# Patient Record
Sex: FEMALE | Race: WHITE | NOT HISPANIC OR LATINO | Employment: FULL TIME | URBAN - METROPOLITAN AREA
[De-identification: names, ages, dates, MRNs, and addresses within clinical notes are randomized per-mention and may not be internally consistent; named-entity substitution may affect disease eponyms.]

---

## 2017-08-15 ENCOUNTER — GENERIC CONVERSION - ENCOUNTER (OUTPATIENT)
Dept: OTHER | Facility: OTHER | Age: 57
End: 2017-08-15

## 2017-09-05 ENCOUNTER — ALLSCRIPTS OFFICE VISIT (OUTPATIENT)
Dept: OTHER | Facility: OTHER | Age: 57
End: 2017-09-05

## 2017-09-05 DIAGNOSIS — R93.89 ABNORMAL FINDINGS ON DIAGNOSTIC IMAGING OF OTHER SPECIFIED BODY STRUCTURES: ICD-10-CM

## 2017-09-05 DIAGNOSIS — C54.1 MALIGNANT NEOPLASM OF ENDOMETRIUM (HCC): ICD-10-CM

## 2017-09-05 DIAGNOSIS — E66.01 MORBID (SEVERE) OBESITY DUE TO EXCESS CALORIES (HCC): ICD-10-CM

## 2017-09-07 RX ORDER — VALSARTAN AND HYDROCHLOROTHIAZIDE 160; 12.5 MG/1; MG/1
1 TABLET, FILM COATED ORAL DAILY
COMMUNITY

## 2017-09-07 RX ORDER — IBUPROFEN 200 MG
TABLET ORAL EVERY 6 HOURS PRN
Status: ON HOLD | COMMUNITY
End: 2017-09-11 | Stop reason: ALTCHOICE

## 2017-09-07 RX ORDER — OXYCODONE HYDROCHLORIDE AND ACETAMINOPHEN 325; 5 MG/5ML; MG/5ML
SOLUTION ORAL EVERY 4 HOURS PRN
COMMUNITY
End: 2018-02-23 | Stop reason: ALTCHOICE

## 2017-09-08 ENCOUNTER — ANESTHESIA EVENT (OUTPATIENT)
Dept: PERIOP | Facility: HOSPITAL | Age: 57
End: 2017-09-08
Payer: COMMERCIAL

## 2017-09-11 ENCOUNTER — LAB CONVERSION - ENCOUNTER (OUTPATIENT)
Dept: OTHER | Facility: OTHER | Age: 57
End: 2017-09-11

## 2017-09-11 ENCOUNTER — ANESTHESIA (OUTPATIENT)
Dept: PERIOP | Facility: HOSPITAL | Age: 57
End: 2017-09-11
Payer: COMMERCIAL

## 2017-09-11 ENCOUNTER — HOSPITAL ENCOUNTER (OUTPATIENT)
Facility: HOSPITAL | Age: 57
Setting detail: OUTPATIENT SURGERY
Discharge: HOME | End: 2017-09-11
Attending: COLON & RECTAL SURGERY | Admitting: COLON & RECTAL SURGERY
Payer: COMMERCIAL

## 2017-09-11 VITALS
HEIGHT: 72 IN | TEMPERATURE: 98.5 F | HEART RATE: 71 BPM | OXYGEN SATURATION: 99 % | DIASTOLIC BLOOD PRESSURE: 87 MMHG | BODY MASS INDEX: 39.68 KG/M2 | RESPIRATION RATE: 18 BRPM | SYSTOLIC BLOOD PRESSURE: 169 MMHG | WEIGHT: 293 LBS

## 2017-09-11 DIAGNOSIS — Z12.11 ENCOUNTER FOR SCREENING FOR MALIGNANT NEOPLASM OF COLON: ICD-10-CM

## 2017-09-11 PROCEDURE — 88305 TISSUE EXAM BY PATHOLOGIST: CPT | Performed by: COLON & RECTAL SURGERY

## 2017-09-11 RX ORDER — PROPOFOL 10 MG/ML
INJECTION, EMULSION INTRAVENOUS AS NEEDED
Status: DISCONTINUED | OUTPATIENT
Start: 2017-09-11 | End: 2017-09-11 | Stop reason: SURG

## 2017-09-11 RX ORDER — MULTIVITAMIN
1 TABLET ORAL DAILY
COMMUNITY

## 2017-09-11 RX ORDER — SODIUM CHLORIDE, SODIUM LACTATE, POTASSIUM CHLORIDE, CALCIUM CHLORIDE 600; 310; 30; 20 MG/100ML; MG/100ML; MG/100ML; MG/100ML
125 INJECTION, SOLUTION INTRAVENOUS CONTINUOUS
Status: ACTIVE | OUTPATIENT
Start: 2017-09-11 | End: 2017-09-11

## 2017-09-11 RX ORDER — ASPIRIN 81 MG/1
81 TABLET ORAL DAILY
COMMUNITY

## 2017-09-11 RX ORDER — SODIUM CHLORIDE, SODIUM LACTATE, POTASSIUM CHLORIDE, CALCIUM CHLORIDE 600; 310; 30; 20 MG/100ML; MG/100ML; MG/100ML; MG/100ML
125 INJECTION, SOLUTION INTRAVENOUS CONTINUOUS
Status: DISCONTINUED | OUTPATIENT
Start: 2017-09-11 | End: 2017-09-11

## 2017-09-11 RX ADMIN — PROPOFOL 50 MG: 10 INJECTION, EMULSION INTRAVENOUS at 14:36

## 2017-09-11 RX ADMIN — PROPOFOL 50 MG: 10 INJECTION, EMULSION INTRAVENOUS at 14:40

## 2017-09-11 RX ADMIN — PROPOFOL 50 MG: 10 INJECTION, EMULSION INTRAVENOUS at 14:20

## 2017-09-11 RX ADMIN — PROPOFOL 50 MG: 10 INJECTION, EMULSION INTRAVENOUS at 14:43

## 2017-09-11 RX ADMIN — PROPOFOL 50 MG: 10 INJECTION, EMULSION INTRAVENOUS at 14:23

## 2017-09-11 RX ADMIN — PROPOFOL 50 MG: 10 INJECTION, EMULSION INTRAVENOUS at 14:32

## 2017-09-11 RX ADMIN — PROPOFOL 50 MG: 10 INJECTION, EMULSION INTRAVENOUS at 14:30

## 2017-09-11 RX ADMIN — SODIUM CHLORIDE, SODIUM LACTATE, POTASSIUM CHLORIDE, AND CALCIUM CHLORIDE 125 ML/HR: 600; 310; 30; 20 INJECTION, SOLUTION INTRAVENOUS at 13:11

## 2017-09-11 RX ADMIN — LIDOCAINE HYDROCHLORIDE 20 MG: 20 INJECTION, SOLUTION INTRAVENOUS at 14:15

## 2017-09-11 RX ADMIN — PROPOFOL 50 MG: 10 INJECTION, EMULSION INTRAVENOUS at 14:27

## 2017-09-11 RX ADMIN — PROPOFOL 50 MG: 10 INJECTION, EMULSION INTRAVENOUS at 14:25

## 2017-09-11 RX ADMIN — PROPOFOL 50 MG: 10 INJECTION, EMULSION INTRAVENOUS at 14:34

## 2017-09-11 RX ADMIN — PROPOFOL 100 MG: 10 INJECTION, EMULSION INTRAVENOUS at 14:18

## 2017-09-21 ENCOUNTER — HOSPITAL ENCOUNTER (OUTPATIENT)
Dept: NON INVASIVE DIAGNOSTICS | Facility: HOSPITAL | Age: 57
Discharge: HOME | End: 2017-09-21
Payer: COMMERCIAL

## 2017-09-21 ENCOUNTER — TRANSCRIBE ORDERS (OUTPATIENT)
Dept: ADMINISTRATIVE | Facility: HOSPITAL | Age: 57
End: 2017-09-21

## 2017-09-21 ENCOUNTER — HOSPITAL ENCOUNTER (OUTPATIENT)
Dept: RADIOLOGY | Facility: HOSPITAL | Age: 57
Discharge: HOME | End: 2017-09-21
Payer: COMMERCIAL

## 2017-09-21 ENCOUNTER — APPOINTMENT (OUTPATIENT)
Dept: LAB | Facility: HOSPITAL | Age: 57
End: 2017-09-21
Payer: COMMERCIAL

## 2017-09-21 ENCOUNTER — LAB REQUISITION (OUTPATIENT)
Dept: LAB | Facility: HOSPITAL | Age: 57
End: 2017-09-21
Payer: COMMERCIAL

## 2017-09-21 ENCOUNTER — GENERIC CONVERSION - ENCOUNTER (OUTPATIENT)
Dept: OTHER | Facility: OTHER | Age: 57
End: 2017-09-21

## 2017-09-21 DIAGNOSIS — E66.01 MORBID OBESITY, UNSPECIFIED OBESITY TYPE (HCC): ICD-10-CM

## 2017-09-21 DIAGNOSIS — E66.01 MORBID (SEVERE) OBESITY DUE TO EXCESS CALORIES (HCC): ICD-10-CM

## 2017-09-21 DIAGNOSIS — C54.1 MALIGNANT NEOPLASM OF ENDOMETRIUM (HCC): ICD-10-CM

## 2017-09-21 DIAGNOSIS — C54.1 ENDOMETRIAL SARCOMA (HCC): Primary | ICD-10-CM

## 2017-09-21 DIAGNOSIS — C54.1 ENDOMETRIAL SARCOMA (HCC): ICD-10-CM

## 2017-09-21 LAB
ALBUMIN SERPL BCP-MCNC: 3.8 G/DL (ref 3.5–5)
ALP SERPL-CCNC: 140 U/L (ref 46–116)
ALT SERPL W P-5'-P-CCNC: 38 U/L (ref 12–78)
ANION GAP SERPL CALCULATED.3IONS-SCNC: 9 MMOL/L (ref 4–13)
AST SERPL W P-5'-P-CCNC: 18 U/L (ref 5–45)
BASOPHILS # BLD AUTO: 0.03 THOUSANDS/ΜL (ref 0–0.1)
BASOPHILS NFR BLD AUTO: 1 % (ref 0–1)
BILIRUB SERPL-MCNC: 1 MG/DL (ref 0.2–1)
BUN SERPL-MCNC: 20 MG/DL (ref 5–25)
CALCIUM SERPL-MCNC: 9.1 MG/DL (ref 8.3–10.1)
CHLORIDE SERPL-SCNC: 99 MMOL/L (ref 100–108)
CO2 SERPL-SCNC: 30 MMOL/L (ref 21–32)
CREAT SERPL-MCNC: 0.9 MG/DL (ref 0.6–1.3)
EOSINOPHIL # BLD AUTO: 0.14 THOUSAND/ΜL (ref 0–0.61)
EOSINOPHIL NFR BLD AUTO: 2 % (ref 0–6)
ERYTHROCYTE [DISTWIDTH] IN BLOOD BY AUTOMATED COUNT: 13.9 % (ref 11.6–15.1)
EST. AVERAGE GLUCOSE BLD GHB EST-MCNC: 128 MG/DL
GFR SERPL CREATININE-BSD FRML MDRD: 71 ML/MIN/1.73SQ M
GLUCOSE SERPL-MCNC: 122 MG/DL (ref 65–140)
HBA1C MFR BLD: 6.1 % (ref 4.2–6.3)
HCT VFR BLD AUTO: 45.2 % (ref 34.8–46.1)
HGB BLD-MCNC: 14.8 G/DL (ref 11.5–15.4)
LYMPHOCYTES # BLD AUTO: 2.07 THOUSANDS/ΜL (ref 0.6–4.47)
LYMPHOCYTES NFR BLD AUTO: 32 % (ref 14–44)
MCH RBC QN AUTO: 29.5 PG (ref 26.8–34.3)
MCHC RBC AUTO-ENTMCNC: 32.7 G/DL (ref 31.4–37.4)
MCV RBC AUTO: 90 FL (ref 82–98)
MONOCYTES # BLD AUTO: 0.45 THOUSAND/ΜL (ref 0.17–1.22)
MONOCYTES NFR BLD AUTO: 7 % (ref 4–12)
NEUTROPHILS # BLD AUTO: 3.75 THOUSANDS/ΜL (ref 1.85–7.62)
NEUTS SEG NFR BLD AUTO: 58 % (ref 43–75)
PLATELET # BLD AUTO: 269 THOUSANDS/UL (ref 149–390)
PMV BLD AUTO: 9.6 FL (ref 8.9–12.7)
POTASSIUM SERPL-SCNC: 3.9 MMOL/L (ref 3.5–5.3)
PROT SERPL-MCNC: 8 G/DL (ref 6.4–8.2)
RBC # BLD AUTO: 5.01 MILLION/UL (ref 3.81–5.12)
SODIUM SERPL-SCNC: 138 MMOL/L (ref 136–145)
WBC # BLD AUTO: 6.44 THOUSAND/UL (ref 4.31–10.16)

## 2017-09-21 PROCEDURE — 86900 BLOOD TYPING SEROLOGIC ABO: CPT | Performed by: OBSTETRICS & GYNECOLOGY

## 2017-09-21 PROCEDURE — 71020 HB CHEST X-RAY 2VW FRONTAL&LATL: CPT

## 2017-09-21 PROCEDURE — 80053 COMPREHEN METABOLIC PANEL: CPT

## 2017-09-21 PROCEDURE — 86901 BLOOD TYPING SEROLOGIC RH(D): CPT | Performed by: OBSTETRICS & GYNECOLOGY

## 2017-09-21 PROCEDURE — 36415 COLL VENOUS BLD VENIPUNCTURE: CPT

## 2017-09-21 PROCEDURE — 85025 COMPLETE CBC W/AUTO DIFF WBC: CPT

## 2017-09-21 PROCEDURE — 86850 RBC ANTIBODY SCREEN: CPT | Performed by: OBSTETRICS & GYNECOLOGY

## 2017-09-21 PROCEDURE — 93005 ELECTROCARDIOGRAM TRACING: CPT

## 2017-09-21 PROCEDURE — 83036 HEMOGLOBIN GLYCOSYLATED A1C: CPT

## 2017-09-22 LAB
ABO GROUP BLD: NORMAL
ATRIAL RATE: 91 BPM
BLD GP AB SCN SERPL QL: NEGATIVE
P AXIS: 69 DEGREES
PR INTERVAL: 178 MS
QRS AXIS: 32 DEGREES
QRSD INTERVAL: 90 MS
QT INTERVAL: 370 MS
QTC INTERVAL: 455 MS
RH BLD: POSITIVE
SPECIMEN EXPIRATION DATE: NORMAL
T WAVE AXIS: 37 DEGREES
VENTRICULAR RATE: 91 BPM

## 2017-09-25 ENCOUNTER — TRANSCRIBE ORDERS (OUTPATIENT)
Dept: ADMINISTRATIVE | Facility: HOSPITAL | Age: 57
End: 2017-09-25

## 2017-09-25 DIAGNOSIS — R93.89 ABNORMAL RADIOLOGICAL FINDINGS IN SKIN AND SUBCUTANEOUS TISSUE: Primary | ICD-10-CM

## 2017-09-25 DIAGNOSIS — C54.1 ENDOMETRIAL SARCOMA (HCC): ICD-10-CM

## 2017-09-29 ENCOUNTER — HOSPITAL ENCOUNTER (OUTPATIENT)
Dept: CT IMAGING | Facility: HOSPITAL | Age: 57
Discharge: HOME | End: 2017-09-29
Payer: COMMERCIAL

## 2017-09-29 DIAGNOSIS — R93.89 ABNORMAL RADIOLOGICAL FINDINGS IN SKIN AND SUBCUTANEOUS TISSUE: ICD-10-CM

## 2017-09-29 DIAGNOSIS — C54.1 ENDOMETRIAL SARCOMA (HCC): ICD-10-CM

## 2017-09-29 PROCEDURE — 71260 CT THORAX DX C+: CPT

## 2017-09-29 RX ADMIN — IOHEXOL 100 ML: 350 INJECTION, SOLUTION INTRAVENOUS at 11:53

## 2017-10-04 ENCOUNTER — ANESTHESIA EVENT (OUTPATIENT)
Dept: PERIOP | Facility: HOSPITAL | Age: 57
End: 2017-10-04
Payer: COMMERCIAL

## 2017-10-04 NOTE — PRE-PROCEDURE INSTRUCTIONS
Pre-Surgery Instructions:   Medication Instructions    aspirin (ECOTRIN LOW STRENGTH) 81 mg EC tablet Patient was instructed per "E-Preop"    Multiple Vitamin (MULTIVITAMIN) tablet Patient was instructed per "E-Preop"    oxyCODONE-acetaminophen (ROXICET) 5-325 mg/5 mL solution Patient was instructed per "E-Preop"    valsartan-hydrochlorothiazide (DIOVAN-HCT) 160-12 5 MG per tablet Patient was instructed per "E-Preop"    REVIEWED  PRINTED SURGICAL INSTRUCTIONS WITH PATIENT , PATIENT VERBALIZED UNDERSTANDING  MEDICATIONS REVIEWED  Patient to bring living will and cpap machine  Patient will not take diovan day of surgery   Medication Instruction (ACE/ARB - Blood Pressure Medication)    Please do not take this medication on the morning of your day of surgery  Please restart your medications as soon as clinically feasible  Diovan -12 5 MG Oral Tablet           [employer] Directive    Please bring your [employer] Directive to the hospital on the day of surgery  [employer] Directive        NPO Instructions    Please do not eat or drink anything prior to your surgery as follows: For AM Surgery times = stop at midnight the night before  For PM Surgery times = Midnight to 8AM clear liquids only (Jello, broth, 7up, Sprite, apple juice, black coffee, black tea, Gatorade)  If you are supposed to take any of your medications, do so with a sip of water  Failure to follow these instructions can lead to an increased risk of lung complications and may result in a delay or cancellation of your procedure  If you have any questions, contact your institution for further instructions  Medical Procedure Risk        Medication Instruction (NSAID - Pain Medication)    Please stop ibuprofen, naproxen and other non-steroidal anti-inflammatory drugs (NSAIDS) for 1 week before surgery          Ibuprofen 200 MG Oral Tablet          Sleep Apnea    Please notify surgeon and anesthesiologist if you have sleep apnea, severe snoring, or daytime somnolence  For those sleep apnea patients with continuous positive airway pressure (CPAP or BiPAP) machines, please bring your machine to the hospital on the day of surgery          Sleep Apnea

## 2017-10-04 NOTE — ANESTHESIA PREPROCEDURE EVALUATION
Review of Systems/Medical History  Patient summary reviewed  Chart reviewed  No history of anesthetic complications     Cardiovascular  Exercise tolerance: good,  Hypertension controlled,   Comment: Lower extremity edema- venous insufficiency , No PE,  Pulmonary  Not a smoker , No shortness of breath, No recent URI , Sleep apnea CPAP, ,        GI/Hepatic    Bariatric surgery, Bowel prep  Comment: Hx of stomach stapling     Negative  ROS        Endo/Other  Negative endo/other ROS      GYN  Not currently pregnant ,   Uterine cancer,        Hematology      Comment: History of superficial venous thrombosis in bilateral lower extremities on aspirin  Musculoskeletal  Obesity (BMI 56)  super morbid obesity,        Neurology  Negative neurology ROS      Psychology   Negative psychology ROS            Physical Exam    Airway    Mallampati score: II  TM Distance: >3 FB  Neck ROM: full     Dental   No notable dental hx     Cardiovascular      Pulmonary      Other Findings        Anesthesia Plan  ASA Score- 3       Anesthesia Type- general  Comment: GA with ETT, IV, llao, antiemetics, T/S      Induction- intravenous      Informed Consent  Anesthetic plan and risks discussed with patient

## 2017-10-05 ENCOUNTER — ANESTHESIA (OUTPATIENT)
Dept: PERIOP | Facility: HOSPITAL | Age: 57
End: 2017-10-05
Payer: COMMERCIAL

## 2017-10-05 ENCOUNTER — HOSPITAL ENCOUNTER (OUTPATIENT)
Facility: HOSPITAL | Age: 57
Setting detail: OUTPATIENT SURGERY
Discharge: HOME | End: 2017-10-05
Attending: OBSTETRICS & GYNECOLOGY | Admitting: OBSTETRICS & GYNECOLOGY
Payer: COMMERCIAL

## 2017-10-05 VITALS
OXYGEN SATURATION: 99 % | HEART RATE: 59 BPM | RESPIRATION RATE: 18 BRPM | HEIGHT: 72 IN | TEMPERATURE: 100.3 F | BODY MASS INDEX: 39.68 KG/M2 | SYSTOLIC BLOOD PRESSURE: 147 MMHG | WEIGHT: 293 LBS | DIASTOLIC BLOOD PRESSURE: 68 MMHG

## 2017-10-05 DIAGNOSIS — C54.1 MALIGNANT NEOPLASM OF ENDOMETRIUM (HCC): ICD-10-CM

## 2017-10-05 PROBLEM — I10 HYPERTENSION: Chronic | Status: ACTIVE | Noted: 2017-10-05

## 2017-10-05 PROBLEM — E66.01 MORBID OBESITY WITH BMI OF 60.0-69.9, ADULT (HCC): Chronic | Status: ACTIVE | Noted: 2017-10-05

## 2017-10-05 PROBLEM — G47.30 SLEEP APNEA: Chronic | Status: ACTIVE | Noted: 2017-10-05

## 2017-10-05 LAB — GLUCOSE SERPL-MCNC: 153 MG/DL (ref 65–140)

## 2017-10-05 PROCEDURE — 88305 TISSUE EXAM BY PATHOLOGIST: CPT | Performed by: OBSTETRICS & GYNECOLOGY

## 2017-10-05 PROCEDURE — 88331 PATH CONSLTJ SURG 1 BLK 1SPC: CPT | Performed by: PATHOLOGY

## 2017-10-05 PROCEDURE — 88309 TISSUE EXAM BY PATHOLOGIST: CPT | Performed by: OBSTETRICS & GYNECOLOGY

## 2017-10-05 PROCEDURE — 88341 IMHCHEM/IMCYTCHM EA ADD ANTB: CPT | Performed by: PATHOLOGY

## 2017-10-05 PROCEDURE — 88342 IMHCHEM/IMCYTCHM 1ST ANTB: CPT | Performed by: OBSTETRICS & GYNECOLOGY

## 2017-10-05 PROCEDURE — 82948 REAGENT STRIP/BLOOD GLUCOSE: CPT

## 2017-10-05 PROCEDURE — 88112 CYTOPATH CELL ENHANCE TECH: CPT | Performed by: OBSTETRICS & GYNECOLOGY

## 2017-10-05 PROCEDURE — 88341 IMHCHEM/IMCYTCHM EA ADD ANTB: CPT | Performed by: OBSTETRICS & GYNECOLOGY

## 2017-10-05 RX ORDER — SUCCINYLCHOLINE CHLORIDE 20 MG/ML
INJECTION INTRAMUSCULAR; INTRAVENOUS AS NEEDED
Status: DISCONTINUED | OUTPATIENT
Start: 2017-10-05 | End: 2017-10-05 | Stop reason: SURG

## 2017-10-05 RX ORDER — IBUPROFEN 600 MG/1
600 TABLET ORAL EVERY 6 HOURS PRN
Status: DISCONTINUED | OUTPATIENT
Start: 2017-10-05 | End: 2017-10-05 | Stop reason: HOSPADM

## 2017-10-05 RX ORDER — SODIUM CHLORIDE, SODIUM LACTATE, POTASSIUM CHLORIDE, CALCIUM CHLORIDE 600; 310; 30; 20 MG/100ML; MG/100ML; MG/100ML; MG/100ML
75 INJECTION, SOLUTION INTRAVENOUS CONTINUOUS
Status: DISCONTINUED | OUTPATIENT
Start: 2017-10-05 | End: 2017-10-05 | Stop reason: HOSPADM

## 2017-10-05 RX ORDER — MAGNESIUM HYDROXIDE 1200 MG/15ML
LIQUID ORAL AS NEEDED
Status: DISCONTINUED | OUTPATIENT
Start: 2017-10-05 | End: 2017-10-05 | Stop reason: HOSPADM

## 2017-10-05 RX ORDER — MIDAZOLAM HYDROCHLORIDE 1 MG/ML
INJECTION INTRAMUSCULAR; INTRAVENOUS AS NEEDED
Status: DISCONTINUED | OUTPATIENT
Start: 2017-10-05 | End: 2017-10-05 | Stop reason: SURG

## 2017-10-05 RX ORDER — GLYCOPYRROLATE 0.2 MG/ML
INJECTION INTRAMUSCULAR; INTRAVENOUS AS NEEDED
Status: DISCONTINUED | OUTPATIENT
Start: 2017-10-05 | End: 2017-10-05 | Stop reason: SURG

## 2017-10-05 RX ORDER — LIDOCAINE HYDROCHLORIDE 10 MG/ML
INJECTION, SOLUTION INFILTRATION; PERINEURAL AS NEEDED
Status: DISCONTINUED | OUTPATIENT
Start: 2017-10-05 | End: 2017-10-05 | Stop reason: SURG

## 2017-10-05 RX ORDER — ROCURONIUM BROMIDE 10 MG/ML
INJECTION, SOLUTION INTRAVENOUS AS NEEDED
Status: DISCONTINUED | OUTPATIENT
Start: 2017-10-05 | End: 2017-10-05 | Stop reason: SURG

## 2017-10-05 RX ORDER — OXYCODONE HYDROCHLORIDE AND ACETAMINOPHEN 5; 325 MG/1; MG/1
1 TABLET ORAL EVERY 4 HOURS PRN
Status: DISCONTINUED | OUTPATIENT
Start: 2017-10-05 | End: 2017-10-05 | Stop reason: HOSPADM

## 2017-10-05 RX ORDER — PROPOFOL 10 MG/ML
INJECTION, EMULSION INTRAVENOUS AS NEEDED
Status: DISCONTINUED | OUTPATIENT
Start: 2017-10-05 | End: 2017-10-05 | Stop reason: SURG

## 2017-10-05 RX ORDER — FENTANYL CITRATE/PF 50 MCG/ML
50 SYRINGE (ML) INJECTION
Status: DISCONTINUED | OUTPATIENT
Start: 2017-10-05 | End: 2017-10-05 | Stop reason: HOSPADM

## 2017-10-05 RX ORDER — SODIUM CHLORIDE, SODIUM LACTATE, POTASSIUM CHLORIDE, CALCIUM CHLORIDE 600; 310; 30; 20 MG/100ML; MG/100ML; MG/100ML; MG/100ML
125 INJECTION, SOLUTION INTRAVENOUS CONTINUOUS
Status: DISCONTINUED | OUTPATIENT
Start: 2017-10-05 | End: 2017-10-05

## 2017-10-05 RX ORDER — ONDANSETRON 2 MG/ML
4 INJECTION INTRAMUSCULAR; INTRAVENOUS EVERY 6 HOURS PRN
Status: DISCONTINUED | OUTPATIENT
Start: 2017-10-05 | End: 2017-10-05 | Stop reason: HOSPADM

## 2017-10-05 RX ORDER — ONDANSETRON 2 MG/ML
4 INJECTION INTRAMUSCULAR; INTRAVENOUS ONCE AS NEEDED
Status: DISCONTINUED | OUTPATIENT
Start: 2017-10-05 | End: 2017-10-05 | Stop reason: HOSPADM

## 2017-10-05 RX ORDER — SODIUM CHLORIDE 9 MG/ML
INJECTION, SOLUTION INTRAVENOUS CONTINUOUS PRN
Status: DISCONTINUED | OUTPATIENT
Start: 2017-10-05 | End: 2017-10-05 | Stop reason: SURG

## 2017-10-05 RX ORDER — KETOROLAC TROMETHAMINE 30 MG/ML
INJECTION, SOLUTION INTRAMUSCULAR; INTRAVENOUS AS NEEDED
Status: DISCONTINUED | OUTPATIENT
Start: 2017-10-05 | End: 2017-10-05 | Stop reason: SURG

## 2017-10-05 RX ORDER — INDOCYANINE GREEN AND WATER 25 MG
KIT INJECTION AS NEEDED
Status: DISCONTINUED | OUTPATIENT
Start: 2017-10-05 | End: 2017-10-05 | Stop reason: HOSPADM

## 2017-10-05 RX ORDER — ONDANSETRON 2 MG/ML
INJECTION INTRAMUSCULAR; INTRAVENOUS AS NEEDED
Status: DISCONTINUED | OUTPATIENT
Start: 2017-10-05 | End: 2017-10-05 | Stop reason: SURG

## 2017-10-05 RX ORDER — OXYCODONE HYDROCHLORIDE 10 MG/1
10 TABLET ORAL EVERY 4 HOURS PRN
Status: DISCONTINUED | OUTPATIENT
Start: 2017-10-05 | End: 2017-10-05 | Stop reason: HOSPADM

## 2017-10-05 RX ORDER — FENTANYL CITRATE 50 UG/ML
INJECTION, SOLUTION INTRAMUSCULAR; INTRAVENOUS AS NEEDED
Status: DISCONTINUED | OUTPATIENT
Start: 2017-10-05 | End: 2017-10-05 | Stop reason: SURG

## 2017-10-05 RX ORDER — BUPIVACAINE HYDROCHLORIDE 2.5 MG/ML
INJECTION, SOLUTION EPIDURAL; INFILTRATION; INTRACAUDAL AS NEEDED
Status: DISCONTINUED | OUTPATIENT
Start: 2017-10-05 | End: 2017-10-05 | Stop reason: HOSPADM

## 2017-10-05 RX ADMIN — FENTANYL CITRATE 100 MCG: 50 INJECTION, SOLUTION INTRAMUSCULAR; INTRAVENOUS at 07:49

## 2017-10-05 RX ADMIN — HYDROMORPHONE HYDROCHLORIDE 0.5 MG: 1 INJECTION, SOLUTION INTRAMUSCULAR; INTRAVENOUS; SUBCUTANEOUS at 08:52

## 2017-10-05 RX ADMIN — MIDAZOLAM HYDROCHLORIDE 2 MG: 1 INJECTION, SOLUTION INTRAMUSCULAR; INTRAVENOUS at 07:41

## 2017-10-05 RX ADMIN — ENOXAPARIN SODIUM 40 MG: 40 INJECTION SUBCUTANEOUS at 07:55

## 2017-10-05 RX ADMIN — LIDOCAINE HYDROCHLORIDE 100 MG: 10 INJECTION, SOLUTION INFILTRATION; PERINEURAL at 07:49

## 2017-10-05 RX ADMIN — KETOROLAC TROMETHAMINE 30 MG: 30 INJECTION, SOLUTION INTRAMUSCULAR at 09:56

## 2017-10-05 RX ADMIN — FENTANYL CITRATE 50 MCG: 50 INJECTION, SOLUTION INTRAMUSCULAR; INTRAVENOUS at 09:12

## 2017-10-05 RX ADMIN — ROCURONIUM BROMIDE 30 MG: 10 INJECTION, SOLUTION INTRAVENOUS at 08:37

## 2017-10-05 RX ADMIN — CEFAZOLIN SODIUM 3000 MG: 2 SOLUTION INTRAVENOUS at 08:15

## 2017-10-05 RX ADMIN — SUCCINYLCHOLINE CHLORIDE 160 MG: 20 INJECTION, SOLUTION INTRAMUSCULAR; INTRAVENOUS at 07:49

## 2017-10-05 RX ADMIN — FENTANYL CITRATE 50 MCG: 50 INJECTION INTRAMUSCULAR; INTRAVENOUS at 10:52

## 2017-10-05 RX ADMIN — NEOSTIGMINE METHYLSULFATE 4 MG: 1 INJECTION, SOLUTION INTRAMUSCULAR; INTRAVENOUS; SUBCUTANEOUS at 10:10

## 2017-10-05 RX ADMIN — ROCURONIUM BROMIDE 50 MG: 10 INJECTION, SOLUTION INTRAVENOUS at 07:54

## 2017-10-05 RX ADMIN — HYDROMORPHONE HYDROCHLORIDE 0.5 MG: 1 INJECTION, SOLUTION INTRAMUSCULAR; INTRAVENOUS; SUBCUTANEOUS at 08:35

## 2017-10-05 RX ADMIN — ONDANSETRON 4 MG: 2 INJECTION INTRAMUSCULAR; INTRAVENOUS at 09:56

## 2017-10-05 RX ADMIN — FENTANYL CITRATE 50 MCG: 50 INJECTION INTRAMUSCULAR; INTRAVENOUS at 10:36

## 2017-10-05 RX ADMIN — GLYCOPYRROLATE 0.4 MG: 0.2 INJECTION, SOLUTION INTRAMUSCULAR; INTRAVENOUS at 10:10

## 2017-10-05 RX ADMIN — SODIUM CHLORIDE: 0.9 INJECTION, SOLUTION INTRAVENOUS at 07:56

## 2017-10-05 RX ADMIN — FENTANYL CITRATE 50 MCG: 50 INJECTION INTRAMUSCULAR; INTRAVENOUS at 10:31

## 2017-10-05 RX ADMIN — PROPOFOL 200 MG: 10 INJECTION, EMULSION INTRAVENOUS at 07:49

## 2017-10-05 RX ADMIN — FENTANYL CITRATE 50 MCG: 50 INJECTION, SOLUTION INTRAMUSCULAR; INTRAVENOUS at 10:13

## 2017-10-05 RX ADMIN — SODIUM CHLORIDE, SODIUM LACTATE, POTASSIUM CHLORIDE, AND CALCIUM CHLORIDE: .6; .31; .03; .02 INJECTION, SOLUTION INTRAVENOUS at 07:40

## 2017-10-05 NOTE — DISCHARGE INSTRUCTIONS
Post-Gynecologic Surgery Discharge Instructions:  1  No heavy lifting more than one full gallon of milk (about 8 lbs) for 1 week  2  Nothing in the vagina for 6 weeks  3  You may take stairs one at a time, touching each step with both feet for the first few days, then as tolerated  4  Call the office for fever greater than 100  4'F, heavy vaginal bleeding, or increasing pain  5  Activity as tolerated  6  Avoid driving if taking narcotic pain medications (Percocet)  Post Operative Pain Management:  If you have cramping or mild pain you may take 600 mg Ibuprofen every 6 hours to relieve  If you continue to have residual mild pain not entirely relieved by Ibuprofen then you may take 650 mg of tylenol every 6 hours  If you have moderate pain then please take 1 tab of Percocet every 4 hours for relief  Do not combine with tylenol and please wait at least 4 hours after your last dose of Tylenol  If you have severe pain then please take 2 tabs of Percocet every 6 hours for relief  Do not combine with tylenol and please wait at least 4 hours after your last dose of Tylenol  If you have any questions regarding your prescriptions please call your doctor  Robot Assisted Laparoscopic Hysterectomy   WHAT YOU SHOULD KNOW:   Robot-assisted laparoscopic hysterectomy (RH) is surgery to remove your uterus and cervix using a machine controlled by your surgeon  Your ovaries, fallopian tubes, supporting tissues, some lymph nodes, and the top of your vagina may also be removed  After RH, you will not be able to become pregnant  You will go through menopause if your ovaries are removed  AFTER YOU LEAVE:   Medicines:  · Medicines  may be given to decrease pain or prevent a bacterial infection  You may also need to take hormone medicine such as estrogen  Ask your healthcare provider how to take this medicine safely  · Take your medicine as directed    Call your healthcare provider if you think your medicine is not helping or if you have side effects  Tell him if you are allergic to any medicine  Keep a list of the medicines, vitamins, and herbs you take  Include the amounts, and when and why you take them  Bring the list or the pill bottles to follow-up visits  Carry your medicine list with you in case of an emergency  Activity guidelines:   · You may feel like resting more after surgery  Slowly start to do more each day  Rest when you feel it is needed  · Ask when you can start having sexual intercourse again  What to expect after surgery: It is normal to bleed from your vagina after your uterus and cervix are removed  Change the sanitary pad often to prevent infection  Ask your gynecologist or healthcare provider how much bleeding to expect  Contact your healthcare provider if:   · You have a fever  · Your pain is getting worse, even after you take medicine  · Your incisions look red and swollen, or they have bad-smelling drainage coming from them  · You see new or an increased amount of bright red blood coming from your vagina or your incisions  · You have yellow, green, or bad-smelling discharge coming from your vagina  · You feel pain when you urinate or you need to urinate more often than usual     · You have trouble having a bowel movement  · Your skin is itchy, swollen, or has a rash  · You have questions or concerns about your condition or care  Seek care immediately or call 911 if:   · You feel lightheaded, short of breath, and have chest pain  · You cough up blood  · Your arm or leg feels warm, tender, and painful  It may look swollen and red  · You have more bleeding from your vagina than you were told to expect  © 2014 [employer] is for End User's use only and may not be sold, redistributed or otherwise used for commercial purposes   All illustrations and images included in CareNotes® are the copyrighted property of [employer]  or [employer]  The above information is an  only  It is not intended as medical advice for individual conditions or treatments  Talk to your doctor, nurse or pharmacist before following any medical regimen to see if it is safe and effective for you

## 2017-10-05 NOTE — ANESTHESIA POSTPROCEDURE EVALUATION
Post-Op Assessment Note      CV Status:  Stable    Mental Status:  Alert and awake    Hydration Status:  Euvolemic    PONV Controlled:  Controlled    Airway Patency:  Patent    Post Op Vitals Reviewed: Yes          Staff: CRNA, Anesthesiologist           BP (!) 183/93 (10/05/17 1024)    Temp 98 5 °F (36 9 °C) (10/05/17 1024)    Pulse 87 (10/05/17 1024)   Resp 18 (10/05/17 1024)    SpO2 100 % (10/05/17 1024)

## 2017-10-05 NOTE — OP NOTE
OPERATIVE REPORT  PATIENT NAME: Lily Bernard    :  1960  MRN: [id number]  Pt Location: [facility] OR ROOM 14    SURGERY DATE: 10/5/2017    Surgeon(s) and Role:     * Gay Antony MD - Primary     * Carmen Tom PA-C - Assisting     * Smith Arteaga - Joce    Preop Diagnosis:  Malignant neoplasm of endometrium [C54 1]    Post-Op Diagnosis Codes:     * Malignant neoplasm of endometrium ([facility]) [C54 1]    Procedure(s) (LRB):  ROBOTIC LAPAROSCOPIC HYSTERECTOMY; BILATERAL SALPINGO-OOPHERECTOMY, CYSTO (N/A)    Specimen(s):  ID Type Source Tests Collected by Time Destination   1 :  Washing Pelvic Washing NON-GYNECOLOGIC CYTOLOGY Gay Antony MD 10/5/2017 0294    2 : with cervix Tissue Uterus w/Bilateral Ovaries and Fallopian Tubes TISSUE EXAM Gay Antony MD 10/5/2017 0913        Estimated Blood Loss:   Minimal    Anesthesia Type:   General    Operative Indications: Morbidly obese white female with body mass index 61 kilogram/meters squared  She has biopsy-proven FIGO grade 1 endometrial cancer  After counseling, she elected to undergo definitive surgical management  Operative Findings:  1  Approximately 8-10 week size uterus, endometrium with approximately 2 5 cm gross visible papillary tumor  Frozen section consistent with grade 1 endometrioid adenocarcinoma without evidence of invasion in 1 representative section  2   Grossly normal bilateral fallopian tubes and ovaries  3   After injection of ICG there was no definitive evidence of sentinel lymph node identified  Due to grade 1 tumor and recommendation lymphadenectomy was not performed  4   Cystoscopy demonstrated normal bladder mucosa and bilateral potent ureteral urine jets  Complications:   None    Procedure and Technique:  The patient was taken to the operating room where general endotracheal anesthesia was induced without complications  The patient received antibiotic prophylaxis per hospital protocol    Sequential compression devices were applied to the lower extremities and activated prior to induction of anesthesia  A single dose of preoperative Lovenox was administered  The patient was placed in the dorsolithotomy position in [address] [place] [address] and her lower abdomen, perineum and vagina were prepped and draped in the usual sterile fashion  Under direct visualization the uterus was sounded to approximately 8 cm  The endocervix was dilated  A  LEONARD uterine manipulator was secured in place with a stitch of 0 Vicryl  Quiñones catheter was placed and the intravaginal occluder balloon was inflated  Attention was turned to the abdomen  All port sites were infiltrated with bupivacaine at the beginning of completion of the procedure  An 8 mm skin incision was made in the left lower quadrant near the midclavicular line  Then, using a 5 mm Endopath Excel trocar and the 5 mm laparoscope, the peritoneal cavity was entered under direct visualization  Good intraperitoneal location was confirmed and pneumoperitoneum was created to maximal pressure 15 mm of mercury  A total of four 8 mm robotic trocars were placed and the 5 mm trocar was re -ositioned to the right flank for assistant's use  The patient was placed in steep Trendelenburg and the [employer] Xi system was docked  The above-mentioned findings were noted  The round ligaments were cauterized and divided bilaterally and the bladder was mobilized anteriorly  The peritoneum lateral to the ovarian vessels was divided to both sides, the paravesical and pararectal spaces were developed  Using the firefly camera we attempted to visualize sentinel lymph nodes  However, due to morbid obesity, excessive fatty tissue and diffuse spillage and uptake throughout peritoneal surfaces there was no definitive evidence of sentinel lymph node  The ureters were clearly identified  on both sides  At this point bilateral ovarian vessels were cauterized and divided    The uterine vessels were skeletonized cauterized and divided bilaterally  The cardinal ligaments were serially cauterized and divided on both sides  The bladder was further mobilized anteriorly and a circumferential colpotomy was made  The specimen was retrieved through the vagina  Frozen section was obtained and the above-mentioned findings were noted  The vaginal cuff was closed using a running stitch of 2-0 Stratafix  Airtight closure and excellent hemostasis was confirmed  A layer of Surgicel was placed overlying the vaginal cuff  Copious irrigation was used  All areas of dissection were reinspected and hemostasis was confirmed at low intraperitoneal pressures  The robot was undocked  Pneumoperitoneum was completely released with the assistance of Valsalva maneuvers  The skin at all port sites was closed using a subcuticular stitch of 4-0 Monocryl  Histoacryl was applied to all incisions  The Quiñones was removed  Using the [place] the bladder was retrograde filled with approximately 150 mL of NS  We examined the bladder then using the 5 mm laparoscope  The above mentioned findings were noted  The bladder was catheterized with a Quiñones in drained completely and the catheter was removed at the completion of the procedure  Vaginal exam revealed excellent closure and hemostasis  The patient tolerated the procedure well  Sponge, lap, needle and instrument counts were reported as correct x2  The patient was successfully extubated in the operating room and transferred to the post anesthetic care unit in stable condition       I was present for the entire procedure    Patient Disposition:  PACU     SIGNATURE: Johnson Stewart MD, Mason Burnett  DATE: October 5, 2017  TIME: 10:23 AM

## 2017-10-17 ENCOUNTER — ALLSCRIPTS OFFICE VISIT (OUTPATIENT)
Dept: OTHER | Facility: OTHER | Age: 57
End: 2017-10-17

## 2017-10-25 NOTE — CONSULTS
Assessment  1  Endometrial cancer, grade I (182 0) (C54 1)   2  Morbid obesity with BMI of 60 0-69 9, adult (278 01,V85 44) (E66 01,Z68 44)   3  Never smoker    Plan  Endometrial cancer, grade I, Morbid obesity with BMI of 60 0-69 9, adult    · Oxycodone-Acetaminophen 5-325 MG Oral Tablet (Percocet); For postoperative  pain: Use 1-2 tabs q 4-6 hrs after procedure   Rx By: Thuan Jaimes; Dispense: 0 Days ; #:30 Tablet; Refill: 0;For: Endometrial cancer, grade I, Morbid obesity with BMI of 60 0-69 9, adult; GABRIELLE = N; Record   · Schedule Surgery Treatment  Procedure: Robotic hysterectomy, bilateral  salpingo-oophorectomy, possible staging lymphadenectomy and all other indicated  procedures  Status: Hold For - Scheduling  Requested  for: [id number]   Ordered; For: Endometrial cancer, grade I, Morbid obesity with BMI of 60 0-69 9, adult; Ordered By: Thuan Jaimes Performed:  Due: [id number]   · (1) CBC/PLT/DIFF; Status:Active; Requested WIP:[id number];    Perform:[facility]; RYV:[id number]; Ordered;For:Endometrial cancer, grade I, Morbid obesity with BMI of 60 0-69 9, adult; Ordered By:Noy Maravilla;   · (1) COMPREHENSIVE METABOLIC PANEL; Status:Active; Requested TXA:[id number];    Perform:[facility]; DTR:[id number]; Ordered;For:Endometrial cancer, grade I, Morbid obesity with BMI of 60 0-69 9, adult; Ordered By:Noy Maravilla;   · (1) HEMOGLOBIN A1C; Status:Active; Requested [id number]:[id number];    Perform:[facility]; PTK:[id number]; Ordered;For:Endometrial cancer, grade I, Morbid obesity with BMI of 60 0-69 9, adult; Ordered By:Noy Maravilla;   · (1) TYPE & SCREEN; Status:Active; Requested QZM:[id number];    Perform:[facility]; LORRI:[id number]; Ordered;For:Endometrial cancer, grade I, Morbid obesity with BMI of 60 0-69 9, adult; Ordered By:Noy Maravilla;  currently receiving a biologic? : No  the patient pregnant or have they been in the last 90 days? : No  the patient been transfused in the last 3 months? : No  is the surgery scheduled? : [facility]  or PreOp : PreOp   · * XR CHEST PA & LATERAL; Status:Active; Requested [id number]:[id number];    Perform:[address] [facility]; QXW:[id number]; Ordered;For:Endometrial cancer, grade I, Morbid obesity with BMI of 60 0-69 9, adult; Ordered By:Noy Maravilla;   · COLONOSCOPY; Status:Active; Requested INH:[id number];    Perform:[facility]; MZU:[id number]; Ordered;For:Endometrial cancer, grade I, Morbid obesity with BMI of 60 0-69 9, adult; Ordered By:Noy Maravilla;   · ECG 12-LEAD; Status:Hold For - Scheduling; Requested [id number]:[id number];    Perform:[facility]; Due:[id number]; Ordered;For:Endometrial cancer, grade I, Morbid obesity with BMI of 60 0-69 9, adult; Ordered By:Noy Maravilla;    Discussion/Summary  Discussion Summary:     FIGO grade 1 endometrial cancer: I discussed with patient natural history of this disease  I have recommended definitive surgery by means of robotic hysterectomy, bilateral salpingo-oophorectomy, possible staging lymphadenectomy and all other indicated procedures  I discussed with her rationale and past to selective lymph node staging depending on intraoperative findings  I also discussed limitation to this strategy imposed by her morbid obesity  She also understands morbid obesity increases risk of her surgical procedure  has consented and wants to proceed  We will obtain basic laboratory testing, chest x-ray, EKG and clearance by her primary care physician  I have asked her to hold her baby aspirin for 7 days prior to her surgery  will receive 2 weeks of prophylactic Lovenox given pelvic surgery, malignancy and morbid obesity  have counseled the patient regarding indication, risk, benefits and alternatives to the above-mentioned procedures   She understands risks include but are not limited to hemorrhage requiring blood transfusion, injury to surrounding organs such as bladder, intestines, rectum, ureters, neurovascular structures, etc  We also discussed the possibility of infectious complications and other complications associated with surgical stress such as cardiac events, venous thromboembolism, stroke, etc  All questions answered to her satisfaction  She agrees and wants to proceed  Informed consent signed  We discussed also potential conversion to laparotomy  Health-care maintenance: Overdue for colonoscopy  We'll obtain this test prior to proceeding to surgery  Goals and Barriers: The patient has the current Goals: Proceed with surgery  The patent has the current Barriers: As per history of present illness and discussion  Patient's Capacity to Self-Care: Patient is able to Self-Care  Patient Education: Educational resources provided: Taken Lasix of surgery discussed, preoperative and postoperative counseling  Surgical packet given  Medication SE Review and Pt Understands Tx: The treatment plan was reviewed with the patient/guardian  The patient/guardian understands and agrees with the treatment plan      Chief Complaint  Chief Complaint Free Text Note Form: Patient here for consultation and treatment recommendations for newly diagnosed endometrial cancer  History of Present Illness  Problem [facility] [place]:   Hypertension  Morbid obesity, body mass index 61 kg/m?  Knee/hip/spine osteoarthritis  Newly diagnosed grade 1 endometrial cancer  Review by [address] [place] [address]  [facility]  Previous Therapy:   D&C by Dr Tristan Fletcher at [facility] on August 15, 2017: Endocervical curettage with atypical glandular cells cannot rule out adenocarcinoma  Chronic cervicitis  Endometrial curettings with FIGO grade 1 adenocarcinoma arising on complex hyperplasia with and without atypia  Review by [address] [place] [address]  [facility]  Free Text HPI: White female, morbidly obese with body mass index 61 kg/m? Cindy Mendoza  She has history of reportedly well-controlled hypertension, remote knee replacement and posterior arthritis  She presented to her primary gynecologist complaining of post menopausal bleeding  This started in 2017 as light spotting and evolved into heavier bleeding by 2017  Endometrial assessment by ultrasound revealed a thickened endometrium with vascularity  D&C performed in August confirmed grade 1 adenocarcinoma  She was referred for consultation and management  Reports minimal to no bleeding at this time  Denies changes in bowel or bladder function  Denies other complaints  Review of SystemsROS Reviewed:   ROS reviewed  Past Medical History  GYN Onc Past GYN History [facility]:   Patient GYN History: First period was age 15,--  2,-- Para 2,-- menopause at age 39-- and-- 2 vaginal deliveries  Remote tubal ligation  mammogram 2017: Reportedly normal had colonoscopy  , but-- no abnormal pap smears in the past-- and-- no history of STD(s)  Active Problems And Past Medical History Reviewed: The active problems and past medical history were reviewed and updated today  Surgical History  Surgical History Reviewed: The surgical history was reviewed and updated today  Family History  Family History Reviewed: The family history was reviewed and updated today  Social History   · Never smoker  Social History Reviewed: The social history was reviewed and updated today  The social history was reviewed and is unchanged  Current Meds  Medication List Reviewed: The medication list was reviewed and updated today  Allergies  1   No Known Drug Allergies    Vitals  Vital Signs    Recorded: [id number] 08:58AM Recorded: [id number] 08:47AM   Temperature  98 2 F, Oral   Heart Rate  114, R Radial   Pulse Quality  Normal, R Radial   Respiration Quality  Normal   Respiration  18   Systolic  922, LUE, Sitting   Diastolic  98, LUE, Sitting   Height 6 ft     Weight  450 lb 9 6 oz   BMI Calculated 61 11    BSA Calculated 3 01      Physical Exam    Constitutional General appearance: Abnormal  -- Morbidly obese, ambulates with a cane  Neck   Neck: Normal, supple, trachea midline, no masses  Thyroid: Normal, no thyromegaly  Pulmonary   Respiratory effort: No increased work of breathing or signs of respiratory distress  Auscultation of lungs: Clear to auscultation  Cardiovascular   Auscultation of heart: Normal rate and rhythm, normal S1 and S2, no murmurs  Peripheral vascular exam: Normal pulses throughout  No edema noted in lower extremities  Genitourinary   External genitalia: Normal and no lesions appreciated  Vagina: Normal, no lesions or dryness appreciated  Urethra: Normal     Urethral meatus: Normal     Bladder: Normal, soft, non-tender and no prolapse or masses appreciated  Cervix: Normal, no palpable masses  Uterus: Normal, non-tender, not enlarged, and no palpaple masses  -- Exam is limited by body habitus  Adnexa/parametria: Normal, non-tender and no fullness or masses appreciated  -- Exam is limited by body habitus  Abdomen   Abdomen: Normal, soft, non-tender, and no organomegaly noted  -- Obese, redundant pannus  Examination for hernias: No hernias appreciated  Psychiatric   Orientation to person, place, and time: Normal     Mood and affect: Normal     GOG performance status is: 1      Results/Data  Results GYN Oncology:   Results   US: Pelvic ultrasound at [facility] July 25, 2017: Uterus 7 3 x 4 8 x 5 4 cm  Endometrial stripe 22 mm with internal vascularity  Right ovary 1 6 x 1 x 1 7 cm  Left ovary 1 2 x 0 9 x 1 2 cm  No evidence of free fluid     Pathology: Surgical pathology as per history of present illness/problem list       Signatures   Electronically signed by : Elizabeth Wallace MD; Sep  5 2017  9:23AM EST                       (Author)

## 2017-11-01 NOTE — PROGRESS NOTES
Assessment    1  Endometrial cancer, grade I (182 0) (C54 1)   2  Morbid obesity with BMI of 60 0-69 9, adult (278 01,V85 44) (M10 78,A57 66)    Plan  Endometrial cancer, grade I, Morbid obesity with BMI of 60 0-69 9, adult    · Follow-up visit in 1 month Evaluation and Treatment  Follow-up  Status: Hold For -Scheduling  Requested for: [id number]   Ordered; Endometrial cancer, grade I, Morbid obesity with BMI of 60 0-69 9, adult; Ordered By: Thuan Jaimes Performed:  Due: 60GQQ8510    Discussion/Summary    Endometrial cancer: Likely stage IA, FIGO grade 1  Low risk  Adjuvant therapy not indicated  Patient will return to the office in one month for vaginal cuff check  I recommended strict pelvic rest and no heavy lifting  Plan for surveillance discussed  All questions answered  Early onset endometrial cancer before age 61: Likely due to morbid obesity  However, IHC for DNA mismatch repair proteins ordered as per Meeks syndrome protocol  The patient has the current Goals: Continue to recover from surgery, initiate surveillance  The patent has the current Barriers: Limited mobility due to morbid obesity  Patient is able to Self-Care  Educational resources provided: Surgical pathology discussed, copy of report given to patient today  The treatment plan was reviewed with the patient/guardian  The patient/guardian understands and agrees with the treatment plan      Chief Complaint  Postoperative follow-up  Post-Op  Problem [facility]:   Hypertension  Morbid obesity, body mass index 61 kg/m?  Knee/hip/spine osteoarthritis  Newly diagnosed grade 1 endometrial cancer  Review by [facility] pathology pending  Previous Therapy:   D&C by Dr Andrae Hill at [facility] on August 15, 2017: Endocervical curettage with atypical glandular cells cannot rule out adenocarcinoma  Chronic cervicitis  Endometrial curettings with FIGO grade 1 adenocarcinoma arising on complex hyperplasia with and without atypia   Review by [facility]'s pathology pending  October 5, 2017: Robotic hysterectomy, bilateral salpingo-oophorectomy, cystoscopy  Final pathology consistent with FIGO grade 1 endometrioid endometrial adenocarcinoma, 3 4 cm tumor, no evidence of myometrial invasion, evidence of simple hyperplasia with atypia, negative pelvics, no lymphovascular invasion, negative cervix, negative fallopian tubes and ovaries  At least stage IA grade 1  Free Text HPI: Has recovered uneventfully from surgery  Reports some discomfort with bowel movements  Denies vaginal bleeding, minimal discharge  Normal bladder function  Review of Systems Constitutional: No fever, no recent weight gain, no chills, not feeling tired and no recent weight loss  Respiratory: No shortness of breath  Gastrointestinal: as noted in HPI  Genitourinary: No complaints of dysuria, no incontinence, no pelvic pain, no dysmenorrhea, no vaginal discharge or bleeding  Active Problems  1  Abnormal CXR (793 2) (R93 8)   2  Back pain (724 5) (M54 9)   3  Endometrial cancer, grade I (182 0) (C54 1)   4  Hypertension (401 9) (I10)   5  Morbid obesity with BMI of 60 0-69 9, adult (278 01,V85 44) (U26 26,X61 97)    Social History     · Never smoker   · No alcohol use   · Occupation    Current Meds   1  Diovan -12 5 MG Oral Tablet; TAKE 1 TABLET DAILY; Therapy: (Recorded:05Sep2017) to Recorded   2  Eliquis 2 5 MG Oral Tablet; Take one tab PO BID for 14 days after surgery; Therapy: 27TKE1697 to (Last Rx:04Oct2017) Ordered   3  Ibuprofen 200 MG Oral Tablet; 4 TABLETS AS NEEDEDFOR PAIN; Therapy: (Recorded:95Age0028) to Recorded   4  Oxycodone-Acetaminophen 5-325 MG Oral Tablet; For postoperative pain: Use 1-2 tabs q 4-6 hrs after procedure; Therapy: 89SHO6301 to (Last Rx:50Shc9338) Ordered    Allergies  1  No Known Drug Allergies  2  No Known Environmental Allergies   3   No Known Food Allergies    Vitals   Recorded: 51HJJ4772 09:34AM   Temperature 97 7 F, Oral   Heart Rate 88, L Radial   Pulse Quality Normal, L Radial   Respiration Quality Normal   Respiration 18   Systolic 123, LUE, Sitting   Diastolic 308, LUE, Sitting   Height 6 ft    Weight 439 lb    BMI Calculated 59 54   BSA Calculated 2 98       Physical Exam   Constitutional  General appearance: No acute distress, well appearing and well nourished  Pulmonary  Respiratory effort: No increased work of breathing or signs of respiratory distress  Abdomen  Abdomen: Normal, soft, non-tender, and no organomegaly noted  -- Surgical incisions well-healed  Examination for hernias: No hernias appreciated  Psychiatric  Orientation to person, place, and time: Normal    Mood and affect: Normal    GOG performance status is: 0      Results/Data  (1) TISSUE EXAM 05Oct2017 09:13AM Noy Maravilla     Test Name Result Flag Reference   LAB AP CASE REPORT (Report)       Surgical Pathology Report             Case: [id number]                  Authorizing Provider: Damon Suárez MD   Collected:      10/05/2017 0913       Ordering Location:   [address] [place] [address]   Received:      10/05/2017 [facility]                           Pathologist:      Celine Mehta MD                                Intraop:        Celine Mehta MD                                Specimen:  Uterus w/Bilateral Ovaries and Fallopian Tubes, with cervix   LAB AP FINAL DIAGNOSIS (Report)       Carcinoma of Endometrium 1  Specimen identification:   - Specimen: Uterine corpus, cervix, bilateral fallopian tubes and  ovaries    - Procedure: Simple hysterectomy, bilateral salpingo-oophorectomy   - Specimen integrity: Intact   2  Lymph node sampling: Not performed   3   Tumor   - Tumor site: Anterior and posterior endometrium    - Tumor size: Greatest dimension: 3 4 cm   - Histologic type: Endometrioid adenocarcinoma    - Histologic grade: FIGO grade 1   - Myometrial invasion (pT1a) in millimeters: No myometrial invasion  identified    - Involvement of cervix: Not involved    - Extent of involvement of other organs: Bilateral ovaries and  fallopian tubes negative for carcinoma   4  Peritoneal washings or ascites fluid: Negative ([id number])  5  Margins: Uninvolved by invasive carcinoma   6  Lymph-vascular invasion: Not identified   7  Regional lymph nodes (pNx):None submitted   8  Additional pathologic findings: Simple hyperplasia with atypia  9  Ancillary studies: Blocks A11-A15 are available for molecular studies  10  Clinical history: None  11  7th Ed AJCC Stage: at least Stage IA - pT1a, pNx, FIGO grade 1  A  Uterus, bilateral fallopian tubes and ovaries (LUNA-BSO): - Endometrioid adenocarcinoma, FIGO grade 1 - See staging protocol above (pT1aNx)  Comment: p16 and p53 show patchy staining  The WT1 is negative  The  immunopanel supports an endometrioid adenocarcinoma  Intradepartmental consultation concurs with the diagnosis of endometrioid  adenocarcinoma  MOLECULAR TESTING AND CONSULT SLIDES:   Molecular testing: Blocks A11-A15 are available for molecular studies  Consult slides:  Slides A12-A14 are available for consultation  Electronically signed by Nan Troy MD on 10/13/2017 at 4:50 PM   This is an appended report  These results have been appended to a previously preliminary verified report  LAB AP INTRAOPERATIVE CONSULTATION        FSAdx: Endometrioid adenocarcinoma, FIGO grade 1  No definitive  myometrial invasion seen on one (1) representative frozen section  Dr Juani Chen to Dr Yanique Summers on 10/5/17 at 9:35 am    LAB AP SURGICAL ADDITIONAL INFORMATION (Report)       All controls performed with the immunohistochemical stains reported above  reacted appropriately  These tests were developed and their performance  characteristics determined by [facility] or  [facility]  They may not be cleared or approved by the [place]  Food and Drug Administration   The FDA has determined that such clearance  or approval is not necessary  These tests are used for clinical purposes  They should not be regarded as investigational or for research  This  laboratory has been approved by [place] [id number], designated as a high-complexity  laboratory and is qualified to perform these tests  LAB AP GROSS DESCRIPTION (Report)       A  Received fresh for frozen section labeled with the patient's name and  medical record designated uterus with bilateral ovaries and fallopian  tubes is one previously bivalved uterus with attached bilateral fallopian  tubes and ovaries weighing 104 53g  The parametrium is inked black  The  uterus with cervix measures 7 8 x 4 x 4 cm  A slit-shaped 1 cm os is  present  The parametrial margins are shaved  The uterus is further  sectioned revealing a cervix exhibiting multiple small mucus filled cysts  The endometrial cavity is roughly triangular and is covered by tan to pink  heaped up/friable lesional tissue measuring 3 4 x 3 0 cm in greatest  dimension  The lesional tissue appears to possibly extend into the  anterior lower uterine segment  The lesional tissue is located 2 6 cm from  the cervical margin and 1 cm or greater from all parametrial margins  Upon  cut section the lesional tissue focally appears to invade slightly into  the underlying myometrium to a depth of 1-2 mm, involving less than 50% of  the underlying myometrium which averages 1 7 cm in thickness  A  representative section is submitted for frozen section analysis  The left  fallopian tube is tan to purple, not distally fimbriated, measuring 1 5 cm  in length by 0 4 cm in average diameter  Upon cut section the tube appears  unremarkable  The attached left ovary is tan purple, measuring 2 6 x 1 8 x  1 4 cm  The ovary is sectioned revealing a single small smooth lined cyst   The right fallopian tube is tan to purple, not distally fimbriated,  measuring 1 7 cm in length by 0 3 cm in average diameter   Upon cut section  the tube appears unremarkable  The attached right ovary is tan-purple,  measuring 2 7 x 1 7 x 1 5 cm  The ovary is sectioned revealing a single  smooth lined small cyst  Representative sections  Twenty-three cassettes  1: Frozen section tissue 2: Left anterior parametrial margin, shave 3: Right anterior parametrial margin, shaved 4: Left posterior parametrial margin, shaved 5: Right posterior parametrial margin, shaved 6: Anterior cervix 7: Posterior cervix 8: Anterior lower uterine segment 9: Posterior lower uterine segment 10-12: Anterior endomyometrium, full thickness section in each cassette 13-15: Posterior endomyometrium, full thickness section in each cassette 16: Left fallopian tube 17-19: Left ovary 20: Right fallopian tube 21-23: Right ovary  Note: The estimated total formalin fixation time based upon information  provided by the submitting clinician and the standard processing schedule  is over 72 hours  MAC   This is an appended report  These results have been appended to a previously preliminary verified report         Signatures   Electronically signed by : Juliet Canales MD; Oct 17 2017 10:16AM EST                       (Author)

## 2017-11-14 ENCOUNTER — GENERIC CONVERSION - ENCOUNTER (OUTPATIENT)
Dept: OTHER | Facility: OTHER | Age: 57
End: 2017-11-14

## 2018-01-11 NOTE — MISCELLANEOUS
Message  Return to work or school:   Nadia Ziegler is under my professional care  She was seen in my office on 10/17/17   She is able to return to work on  11/02/2017      Patient will be able to return to work with 10lbs weight restrictions and no excessive periods on sitting or standing/ Patient will be re-evaluated on 11/14/17     Manjit Garza MD       Signatures   Electronically signed by : Feng Salgado MA; Oct 27 2017 11:08AM EST                       (Author)

## 2018-01-13 VITALS
RESPIRATION RATE: 18 BRPM | DIASTOLIC BLOOD PRESSURE: 100 MMHG | BODY MASS INDEX: 39.68 KG/M2 | WEIGHT: 293 LBS | SYSTOLIC BLOOD PRESSURE: 140 MMHG | HEIGHT: 72 IN | HEART RATE: 88 BPM | TEMPERATURE: 97.7 F

## 2018-01-14 VITALS
WEIGHT: 293 LBS | HEIGHT: 72 IN | RESPIRATION RATE: 18 BRPM | DIASTOLIC BLOOD PRESSURE: 98 MMHG | TEMPERATURE: 98.2 F | SYSTOLIC BLOOD PRESSURE: 146 MMHG | HEART RATE: 114 BPM | BODY MASS INDEX: 39.68 KG/M2

## 2018-01-18 NOTE — CONSULTS
Chief Complaint  Patient here for consultation and treatment recommendations for newly diagnosed endometrial cancer  History of Present Illness      #1  Hypertension  #2  Morbid obesity, body mass index 61 kg/m^2  #3  Knee/hip/spine osteoarthritis  #4  Newly diagnosed grade 1 endometrial cancer  Review by [facility] pathology pending  #1  D&C by Dr Torsten Jack at [facility] on August 15, 2017: Endocervical curettage with atypical glandular cells cannot rule out adenocarcinoma  Chronic cervicitis  Endometrial curettings with FIGO grade 1 adenocarcinoma arising on complex hyperplasia with and without atypia  Review by [facility] pathology pending  59-year-old White female, morbidly obese with body mass index 61 kg/m^2  She has history of reportedly well-controlled hypertension, remote knee replacement and posterior arthritis  She presented to her primary gynecologist complaining of post menopausal bleeding  This started in 2017 as light spotting and evolved into heavier bleeding by 2017  Endometrial assessment by ultrasound revealed a thickened endometrium with vascularity  D&C performed in August confirmed grade 1 adenocarcinoma  She was referred for consultation and management  Reports minimal to no bleeding at this time  Denies changes in bowel or bladder function  Denies other complaints  Review of Systems  ROS reviewed  Past Medical History    Patient GYN History: First period was age 15,  2, Para 2, menopause at age 39 and 2 vaginal deliveries  Remote tubal ligation  Last mammogram 2017: Reportedly normal   Never had colonoscopy  , but no abnormal pap smears in the past and no history of STD(s)  The active problems and past medical history were reviewed and updated today  Surgical History    The surgical history was reviewed and updated today  Family History    The family history was reviewed and updated today         Social History    · Never smoker  The social history was reviewed and updated today  The social history was reviewed and is unchanged  Current Meds    The medication list was reviewed and updated today  Allergies    1  No Known Drug Allergies    Vitals   Recorded: 05Sep2017 08:58AM Recorded: 05Sep2017 08:47AM   Temperature  98 2 F, Oral   Heart Rate  114, R Radial   Pulse Quality  Normal, R Radial   Respiration Quality  Normal   Respiration  18   Systolic  015, LUE, Sitting   Diastolic  98, LUE, Sitting   Height 6 ft     Weight  450 lb 9 6 oz   BMI Calculated 61 11    BSA Calculated 3 01      Physical Exam    Constitutional   General appearance: Abnormal   Morbidly obese, ambulates with a cane  Neck   Neck: Normal, supple, trachea midline, no masses  Thyroid: Normal, no thyromegaly  Pulmonary   Respiratory effort: No increased work of breathing or signs of respiratory distress  Auscultation of lungs: Clear to auscultation  Cardiovascular   Auscultation of heart: Normal rate and rhythm, normal S1 and S2, no murmurs  Peripheral vascular exam: Normal pulses throughout  No edema noted in lower extremities  Genitourinary   External genitalia: Normal and no lesions appreciated  Vagina: Normal, no lesions or dryness appreciated  Urethra: Normal     Urethral meatus: Normal     Bladder: Normal, soft, non-tender and no prolapse or masses appreciated  Cervix: Normal, no palpable masses  Uterus: Normal, non-tender, not enlarged, and no palpaple masses  Exam is limited by body habitus  Adnexa/parametria: Normal, non-tender and no fullness or masses appreciated  Exam is limited by body habitus  Abdomen   Abdomen: Normal, soft, non-tender, and no organomegaly noted  Obese, redundant pannus  Examination for hernias: No hernias appreciated      Psychiatric   Orientation to person, place, and time: Normal     Mood and affect: Normal     GOG performance status is: 1      Results/Data    Results   US: Pelvic ultrasound at [facility] July 25, 2017: Uterus 7 3 x 4 8 x 5 4 cm  Endometrial stripe 22 mm with internal vascularity  Right ovary 1 6 x 1 x 1 7 cm  Left ovary 1 2 x 0 9 x 1 2 cm  No evidence of free fluid  Pathology: Surgical pathology as per history of present illness/problem list       Assessment    1  Endometrial cancer, grade I (182 0) (C54 1)   2  Morbid obesity with BMI of 60 0-69 9, adult (278 01,V85 44) (E66 01,Z68 44)   3  Never smoker    Plan  Endometrial cancer, grade I, Morbid obesity with BMI of 60 0-69 9, adult    · Oxycodone-Acetaminophen 5-325 MG Oral Tablet (Percocet); For postoperative  pain: Use 1-2 tabs q 4-6 hrs after procedure   Rx By: Lion Crespo; Dispense: 0 Days ; #:30 Tablet; Refill: 0; For: Endometrial cancer, grade I, Morbid obesity with BMI of 60 0-69 9, adult; GABRIELLE = N; Record   · Schedule Surgery Treatment  Procedure: Robotic hysterectomy, bilateral  salpingo-oophorectomy, possible staging lymphadenectomy and all other indicated  procedures  Status: Hold For - Scheduling  Requested  for: [id number]   Ordered; For: Endometrial cancer, grade I, Morbid obesity with BMI of 60 0-69 9, adult; Ordered By: Lion Crespo Performed:  Due: 02YLI7325   · (1) CBC/PLT/DIFF; Status:Active; Requested PQC:[id number];    Perform:[facility]; MAX:[id number]; Ordered;  For:Endometrial cancer, grade I, Morbid obesity with BMI of 60 0-69 9, adult; Ordered By:Noy Maravilla;   · (1) COMPREHENSIVE METABOLIC PANEL; Status:Active; Requested UXH:[id number];    Perform:[facility]; KIN:[id number]; Ordered;  For:Endometrial cancer, grade I, Morbid obesity with BMI of 60 0-69 9, adult; Ordered By:Noy Maravilla;   · (1) HEMOGLOBIN A1C; Status:Active; Requested DIRK:[id number];    Perform:[facility]; FAT:[id number]; Ordered;  For:Endometrial cancer, grade I, Morbid obesity with BMI of 60 0-69 9, adult; Ordered By:Noy Maravilla;   · (1) TYPE & SCREEN; Status:Active; Requested MARCO:[id number];    Perform:[facility]; ANF:[id number]; Ordered;  For:Endometrial cancer, grade I, Morbid obesity with BMI of 60 0-69 9, adult; Ordered By:Noy Maravilla;  currently receiving a biologic? : No  the patient pregnant or have they been in the last 90 days? : No  the patient been transfused in the last 3 months? : No  is the surgery scheduled? : [address] [facility]  or PreOp : PreOp   · * XR CHEST PA & LATERAL; Status:Active; Requested OHT:[id number];    Perform:[facility]; EFX:[id number]; Ordered;  For:Endometrial cancer, grade I, Morbid obesity with BMI of 60 0-69 9, adult; Ordered By:Noy Maravilla;   · COLONOSCOPY; Status:Active; Requested OOZ:[id number];    Perform:[facility]; [id number]; Ordered;  For:Endometrial cancer, grade I, Morbid obesity with BMI of 60 0-69 9, adult; Ordered By:Noy Maravilla;   · ECG 12-LEAD; Status:Hold For - Scheduling; Requested PLK:[id number];    Perform:[facility]; Due:83Dxu5521; Ordered;  For:Endometrial cancer, grade I, Morbid obesity with BMI of 60 0-69 9, adult; Ordered By:Noy Maravilla;    Discussion/Summary        #1  FIGO grade 1 endometrial cancer: I discussed with patient natural history of this disease  I have recommended definitive surgery by means of robotic hysterectomy, bilateral salpingo-oophorectomy, possible staging lymphadenectomy and all other indicated procedures  I discussed with her rationale and past to selective lymph node staging depending on intraoperative findings  I also discussed limitation to this strategy imposed by her morbid obesity  She also understands morbid obesity increases risk of her surgical procedure  She has consented and wants to proceed  We will obtain basic laboratory testing, chest x-ray, EKG and clearance by her primary care physician  I have asked her to hold her baby aspirin for 7 days prior to her surgery    She will receive 2 weeks of prophylactic Lovenox given pelvic surgery, malignancy and morbid obesity  I have counseled the patient regarding indication, risk, benefits and alternatives to the above-mentioned procedures  She understands risks include but are not limited to hemorrhage requiring blood transfusion, injury to surrounding organs such as bladder, intestines, rectum, ureters, neurovascular structures, etc  We also discussed the possibility of infectious complications and other complications associated with surgical stress such as cardiac events, venous thromboembolism, stroke, etc  All questions answered to her satisfaction  She agrees and wants to proceed  Informed consent signed  We discussed also potential conversion to laparotomy  #2  Health-care maintenance: Overdue for colonoscopy  We'll obtain this test prior to proceeding to surgery  The patient has the current Goals: Proceed with surgery  The patent has the current Barriers: As per history of present illness and discussion  Patient is able to Self-Care  Educational resources provided: Taken Lasix of surgery discussed, preoperative and postoperative counseling  Surgical packet given  The treatment plan was reviewed with the patient/guardian   The patient/guardian understands and agrees with the treatment plan      Signatures   Electronically signed by : Gilbert Alba MD; Sep  5 2017  9:23AM EST                       (Author)

## 2018-01-22 VITALS
HEIGHT: 72 IN | TEMPERATURE: 97.6 F | BODY MASS INDEX: 39.68 KG/M2 | SYSTOLIC BLOOD PRESSURE: 142 MMHG | DIASTOLIC BLOOD PRESSURE: 82 MMHG | RESPIRATION RATE: 18 BRPM | WEIGHT: 293 LBS | HEART RATE: 94 BPM

## 2018-02-23 ENCOUNTER — OFFICE VISIT (OUTPATIENT)
Dept: URGENT CARE | Facility: CLINIC | Age: 58
End: 2018-02-23
Payer: COMMERCIAL

## 2018-02-23 VITALS
TEMPERATURE: 99.4 F | HEIGHT: 72 IN | BODY MASS INDEX: 39.68 KG/M2 | SYSTOLIC BLOOD PRESSURE: 124 MMHG | DIASTOLIC BLOOD PRESSURE: 84 MMHG | WEIGHT: 293 LBS | OXYGEN SATURATION: 96 % | HEART RATE: 90 BPM

## 2018-02-23 DIAGNOSIS — M10.9 ACUTE GOUT INVOLVING TOE OF RIGHT FOOT, UNSPECIFIED CAUSE: Primary | ICD-10-CM

## 2018-02-23 PROCEDURE — 99203 OFFICE O/P NEW LOW 30 MIN: CPT | Performed by: PHYSICIAN ASSISTANT

## 2018-02-23 RX ADMIN — Medication 10 MG: at 13:44

## 2018-02-23 NOTE — PATIENT INSTRUCTIONS
Gout   AMBULATORY CARE:   Gout  is a form of arthritis that causes severe joint pain and stiffness  Acute gout pain starts suddenly, gets worse quickly, and stops on its own  Acute gout can become chronic and cause permanent damage to your joints  Seek care immediately if:   · You have severe pain in one or more of your joints that you cannot tolerate  · You have a fever or redness that spreads beyond the joint area  Contact your healthcare provider if:   · You have new symptoms, such as a rash, after you start gout treatment  · Your joint pain and swelling do not go away, even after treatment  · You are not urinating as much or as often as you usually do  · You have trouble taking your gout medicines  · You have questions or concerns about your condition or care  Stages of gout:   · Hyperuricemia  starts with high levels of uric acid  Hyperuricemia is not gout, but it increases your risk for gout  You may have no symptoms at this stage, and it usually does not need treatment  · Acute gouty arthritis  starts with a sudden attack of pain and swelling, usually in 1 joint  The attack may last from a few days to 2 weeks  · Intercritical gout  is the time between attacks  You may go months or years without another attack  You will not have joint pain or stiffness, but this does not mean your gout is cured  You will still need treatment to prevent chronic gout  · Chronic tophaceous gout  develops if gout is not treated  Large amounts of uric acid crystals, called tophi, collect around your joints  The crystals can destroy or deform the joints  Gout attacks occur more often, and last hours to weeks  More than 1 joint may be painful and swollen  At this stage, gout symptoms do not go away on their own  Treatment  can make your symptoms stop sooner, prevent attacks, and decrease your risk of joint damage   You may need any of the following:  · Medicines:      ¨ NSAIDs , such as ibuprofen, help decrease swelling, pain, and fever  This medicine is available with or without a doctor's order  NSAIDs can cause stomach bleeding or kidney problems in certain people  If you take blood thinner medicine, always ask your healthcare provider if NSAIDs are safe for you  Always read the medicine label and follow directions  ¨ Gout medicine  decreases joint pain and swelling  It may also be given to prevent new gout attacks  ¨ Steroids  reduce inflammation and can help your joint stiffness and pain during gout attacks  ¨ Uric acid medicine  may be given to reduce the amount of uric acid your body makes  Some medicines may help you pass more uric acid when you urinate  ¨ Take your medicine as directed  Contact your healthcare provider if you think your medicine is not helping or if you have side effects  Tell him or her if you are allergic to any medicine  Keep a list of the medicines, vitamins, and herbs you take  Include the amounts, and when and why you take them  Bring the list or the pill bottles to follow-up visits  Carry your medicine list with you in case of an emergency  · Surgery  called a bone graft may be needed for tophi that are painful or infected  Bone in the joint may be replaced with bone taken from another place in your body  Ask your healthcare provider for more information about bone graft surgery  Manage gout:   · Rest your painful joint so it can heal   Your healthcare provider may recommend crutches or a walker if the affected joint is in a leg  · Apply ice to your joint  Ice decreases pain and swelling  Use an ice pack, or put crushed ice in a plastic bag  Cover the ice pack or bag with a towel before you apply it to your painful joint  Apply ice for 15 to 20 minutes every hour, or as directed  · Elevate your joint  Elevation helps reduce swelling and pain  Raise your joint above the level of your heart as often as you can   Prop your painful joint on pillows to keep it above your heart comfortably  · Go to physical therapy if directed  A physical therapist can teach you exercises to improve flexibility and range of motion  Prevent gout attacks:   · Do not eat high-purine foods  These foods include meats, seafood, asparagus, spinach, cauliflower, and some types of beans  Healthcare providers may tell you to eat more low-fat milk products, such as yogurt  Milk products may decrease your risk for gout attacks  Vitamin C and coffee may also help  Your healthcare provider or dietitian can help you create a meal plan  · Drink more liquids as directed  Liquids such as water help remove uric acid from your body  Ask how much liquid to drink each day and which liquids are best for you  · Manage your weight  Weight loss may decrease the amount of uric acid in your body  Exercise can help you lose weight  Talk to your healthcare provider about the best exercises for you  · Control your blood sugar level if you have diabetes  Keep your blood sugar level in a normal range  This can help prevent gout attacks  · Limit or do not drink alcohol as directed  Alcohol can trigger a gout attack  Alcohol also increases your risk for dehydration  Ask your healthcare provider if alcohol is safe for you  Follow up with your healthcare provider as directed:  Write down your questions so you remember to ask them during your visits  © 2017 [address] Information is for End User's use only and may not be sold, redistributed or otherwise used for commercial purposes  All illustrations and images included in CareNotes® are the copyrighted property of [employer]  or [employer]  The above information is an  only  It is not intended as medical advice for individual conditions or treatments  Talk to your doctor, nurse or pharmacist before following any medical regimen to see if it is safe and effective for you

## 2018-02-23 NOTE — PROGRESS NOTES
[facility]        NAME: Debora Manzanares is a 62 y o  female  : 1960    MRN: [id number]  DATE: 2018  TIME: 1:38 PM    Assessment and Plan   Acute gout involving toe of right foot, unspecified cause [M10 9]  1  Acute gout involving toe of right foot, unspecified cause  dexamethasone (DECADRON) injection 10 mg         Patient Instructions     Follow-up with family doctor if there is no improvement  Highly suggest refrain from eating red meat and switch to eating more chicken or fish  Do not take any NSAIDs for the next 3 days due to the steroid injection and a higher possibility of stomach issues when taken with steroids  All    Follow up with PCP in 3-5 days  Proceed to  ER if symptoms worsen  Chief Complaint     Chief Complaint   Patient presents with    Foot Pain     C/O redness and pain in joint of right great toe upon rising this AM  Pt denies any known injury  History of Present Illness       Patient presents with a sudden onset of right great toe pain which occurred at 2:00 a m  this morning  She states the toe is painful as slight redness had increased pain with the sheets touching her foot  She does not have a history of gout in the past   However, recently she started eating a lot more meat especially red meat since she started a low carb diet  Denies any trauma to the area no paresthesias or open wounds  Review of Systems   Review of Systems   Constitutional: Negative for chills and fever  HENT: Negative for rhinorrhea and sore throat  Eyes: Negative for redness  Respiratory: Negative for cough  Cardiovascular: Negative for chest pain  Gastrointestinal: Negative for abdominal pain  Musculoskeletal: Positive for arthralgias (right great toe) and gait problem (ambulates with cane)  Negative for myalgias  Skin: Negative for rash  Neurological: Negative for numbness  Hematological: Negative for adenopathy           Current Medications Current Outpatient Prescriptions:     aspirin (ECOTRIN LOW STRENGTH) 81 mg EC tablet, Take 81 mg by mouth daily, Disp: , Rfl:     Multiple Vitamin (MULTIVITAMIN) tablet, Take 1 tablet by mouth daily, Disp: , Rfl:     valsartan-hydrochlorothiazide (DIOVAN-HCT) 160-12 5 MG per tablet, Take 1 tablet by mouth daily, Disp: , Rfl:     Current Facility-Administered Medications:     dexamethasone (DECADRON) injection 10 mg, 10 mg, Intramuscular, Once, Nash Arteaga PA-C    Current Allergies     Allergies as of 02/23/2018    (No Known Allergies)            The following portions of the patient's history were reviewed and updated as appropriate: allergies, current medications, past family history, past medical history, past social history, past surgical history and problem list          Objective   /84   Pulse 90   Temp 99 4 °F (37 4 °C)   Ht 6' 0 01" (1 829 m)   Wt (!) 203 kg (447 lb 8 5 oz)   SpO2 96%   BMI 60 68 kg/m²        Physical Exam     Physical Exam   Constitutional: She is oriented to person, place, and time  She appears well-developed and well-nourished  HENT:   Head: Normocephalic and atraumatic  Eyes: Conjunctivae are normal    Neck: No tracheal deviation present  Cardiovascular: Normal rate and regular rhythm  Pulmonary/Chest: Effort normal and breath sounds normal    Neurological: She is alert and oriented to person, place, and time  Skin: Skin is warm and dry  No rash noted  Psychiatric: She has a normal mood and affect

## 2018-03-18 ENCOUNTER — OFFICE VISIT (OUTPATIENT)
Dept: URGENT CARE | Facility: CLINIC | Age: 58
End: 2018-03-18
Payer: COMMERCIAL

## 2018-03-18 VITALS
SYSTOLIC BLOOD PRESSURE: 128 MMHG | HEART RATE: 86 BPM | DIASTOLIC BLOOD PRESSURE: 64 MMHG | HEIGHT: 72 IN | WEIGHT: 293 LBS | OXYGEN SATURATION: 100 % | TEMPERATURE: 98.9 F | BODY MASS INDEX: 39.68 KG/M2

## 2018-03-18 DIAGNOSIS — M62.838 MUSCLE SPASM: Primary | ICD-10-CM

## 2018-03-18 PROCEDURE — 99213 OFFICE O/P EST LOW 20 MIN: CPT | Performed by: NURSE PRACTITIONER

## 2018-03-18 RX ORDER — CYCLOBENZAPRINE HCL 10 MG
10 TABLET ORAL 3 TIMES DAILY PRN
Qty: 15 TABLET | Refills: 0 | Status: SHIPPED | OUTPATIENT
Start: 2018-03-18 | End: 2019-03-26 | Stop reason: ALTCHOICE

## 2018-03-18 NOTE — PROGRESS NOTES
[facility]        NAME: Desiree Red is a 62 y o  female  : 1960    MRN: [id number]  DATE: 2018  TIME: 12:22 PM    Assessment and Plan   Muscle spasm [M62 838]  1  Muscle spasm  cyclobenzaprine (FLEXERIL) 10 mg tablet         Patient Instructions       Follow up with PCP in 3-5 days  Proceed to  ER if symptoms worsen  Chief Complaint     Chief Complaint   Patient presents with    Back Pain     history of spasms         History of Present Illness       68-year-old female presents urgent care with chief right-sided lumbar back spasm  She states she does have a history of chronic back pain spasming times  She denies any injury or trauma denies any numbness or tingling or radiation of pain  Back Pain         Review of Systems   Review of Systems   Constitutional: Negative  HENT: Negative  Eyes: Negative  Respiratory: Negative  Cardiovascular: Negative  Gastrointestinal: Negative  Endocrine: Negative  Genitourinary: Negative  Musculoskeletal: Positive for back pain  Skin: Negative  Allergic/Immunologic: Negative  Neurological: Negative  Hematological: Negative  Psychiatric/Behavioral: Negative            Current Medications       Current Outpatient Prescriptions:     aspirin (ECOTRIN LOW STRENGTH) 81 mg EC tablet, Take 81 mg by mouth daily, Disp: , Rfl:     cyclobenzaprine (FLEXERIL) 10 mg tablet, Take 1 tablet (10 mg total) by mouth 3 (three) times a day as needed for muscle spasms for up to 5 days, Disp: 15 tablet, Rfl: 0    Multiple Vitamin (MULTIVITAMIN) tablet, Take 1 tablet by mouth daily, Disp: , Rfl:     valsartan-hydrochlorothiazide (DIOVAN-HCT) 160-12 5 MG per tablet, Take 1 tablet by mouth daily, Disp: , Rfl:     Current Allergies     Allergies as of 2018    (No Known Allergies)            The following portions of the patient's history were reviewed and updated as appropriate: allergies, current medications, past family history, past medical history, past social history, past surgical history and problem list      Past Medical History:   Diagnosis Date    Cancer ([facility] [address] )     uterine    Chronic midline low back pain     Hypertension     Morbid obesity ([facility] [address] )     Sleep apnea        Past Surgical History:   Procedure Laterality Date    BACK SURGERY      CHOLECYSTECTOMY      COLONOSCOPY      DILATION AND CURETTAGE OF UTERUS      JOINT REPLACEMENT Bilateral     KNEES    LAPAROSCOPY      MULTIPLE TOOTH EXTRACTIONS      MD COLONOSCOPY FLX DX W/COLLJ SPEC WHEN PFRMD N/A 9/11/2017    Procedure: COLONOSCOPY;  Surgeon: Adore Michel MD;  Location: [facility];  Service: Colorectal    MD LAPAROSCOPY W TOT HYSTERECTUTERUS <=250 GRAM  W TUBE/OVARY N/A 10/5/2017    Procedure: ROBOTIC LAPAROSCOPIC HYSTERECTOMY; BILATERAL SALPINGO-OOPHERECTOMY, CYSTO;  Surgeon: Zana Blanchard MD;  Location:  MAIN OR;  Service: Gynecology Oncology    STOMACH SURGERY      gastric stapling    TUBAL LIGATION         No family history on file  Medications have been verified  Objective   /64   Pulse 86   Temp 98 9 °F (37 2 °C)   Ht 6' (1 829 m)   Wt (!) 203 kg (447 lb)   SpO2 100%   BMI 60 62 kg/m²        Physical Exam     Physical Exam   Constitutional: She is oriented to person, place, and time  Vital signs are normal  She appears well-developed and well-nourished  She is active and cooperative  HENT:   Head: Normocephalic and atraumatic  Right Ear: Hearing normal    Left Ear: Hearing normal    Nose: Nose normal    Mouth/Throat: Uvula is midline and oropharynx is clear and moist    Eyes: Conjunctivae and EOM are normal  Pupils are equal, round, and reactive to light  Neck: Normal range of motion  Cardiovascular: Normal rate, regular rhythm, normal heart sounds and normal pulses      Pulmonary/Chest: Effort normal and breath sounds normal    Musculoskeletal:        Lumbar back: Normal  Back:    Lymphadenopathy:     She has no cervical adenopathy  Neurological: She is alert and oriented to person, place, and time

## 2018-03-18 NOTE — PATIENT INSTRUCTIONS
Muscle Spasm   WHAT YOU NEED TO KNOW:   A muscle spasm is a sudden contraction of any muscle or group of muscles  A muscle cramp is a painful muscle spasm  Muscle cramps commonly occur after intense exercise or during pregnancy  They may also be caused by certain medications, dehydration, low calcium or magnesium levels, or another medical condition  DISCHARGE INSTRUCTIONS:   Medicines: You may need the following:  · NSAIDs  help decrease swelling and pain or fever  This medicine is available with or without a doctor's order  NSAIDs can cause stomach bleeding or kidney problems in certain people  If you take blood thinner medicine, always ask your healthcare provider if NSAIDs are safe for you  Always read the medicine label and follow directions  · Take your medicine as directed  Contact your healthcare provider if you think your medicine is not helping or if you have side effects  Tell him of her if you are allergic to any medicine  Keep a list of the medicines, vitamins, and herbs you take  Include the amounts, and when and why you take them  Bring the list or the pill bottles to follow-up visits  Carry your medicine list with you in case of an emergency  Follow up with your healthcare provider as directed: You may need other tests or treatment  You may also be referred to a physical therapist or other specialist  Write down your questions so you remember to ask them during your visits  Self-care:   · Stretch  your muscle to help relieve the cramp  It may be helpful to keep your muscle in the stretched position until the cramp is gone  · Apply heat  to help decrease pain and muscle spasms  Apply heat on the area for 20 to 30 minutes every 2 hours for as many days as directed  · Apply ice  to help decrease swelling and pain  Ice may also help prevent tissue damage  Use an ice pack, or put crushed ice in a plastic bag   Cover it with a towel and place it on your muscle for 15 to 20 minutes every hour or as directed  · Drink more liquids  to help prevent muscle cramps caused by dehydration  Sports drinks may help replace electrolytes you lose through sweat during exercise  Ask your healthcare provider how much liquid to drink each day and which liquids are best for you  · Eat healthy foods , such as fruits, vegetables, whole grains, low-fat dairy products, and lean proteins (meat, beans, and fish)  If you are pregnant, ask your healthcare provider about foods that are high in magnesium and sodium  They may help to relieve cramps during pregnancy  · Massage your muscle  to help relieve the cramp  · Take frequent deep breaths  until the cramp feels better  Lie down while you take the deep breaths so you do not get dizzy or lightheaded  Contact your healthcare provider if:   · You have signs of dehydration, such as a headache, dark yellow urine, dry eyes or mouth, or a fast heartbeat  · You have questions or concerns about your condition or care  Return to the emergency department if:   · You have warmth, swelling, or redness in the cramping muscle  · You have frequent or unrelieved muscle cramps in several different muscles  · You have muscle cramps with numbness, tingling, and burning in your hands and feet  © 2017 [address] Information is for End User's use only and may not be sold, redistributed or otherwise used for commercial purposes  All illustrations and images included in CareNotes® are the copyrighted property of [employer] , [employer]  or [employer]  The above information is an  only  It is not intended as medical advice for individual conditions or treatments  Talk to your doctor, nurse or pharmacist before following any medical regimen to see if it is safe and effective for you

## 2018-05-22 ENCOUNTER — OFFICE VISIT (OUTPATIENT)
Dept: GYNECOLOGIC ONCOLOGY | Facility: CLINIC | Age: 58
End: 2018-05-22
Payer: COMMERCIAL

## 2018-05-22 VITALS
DIASTOLIC BLOOD PRESSURE: 82 MMHG | SYSTOLIC BLOOD PRESSURE: 146 MMHG | BODY MASS INDEX: 39.68 KG/M2 | HEART RATE: 115 BPM | WEIGHT: 293 LBS | RESPIRATION RATE: 22 BRPM | HEIGHT: 72 IN

## 2018-05-22 DIAGNOSIS — E66.01 MORBID OBESITY WITH BMI OF 60.0-69.9, ADULT (HCC): Chronic | ICD-10-CM

## 2018-05-22 DIAGNOSIS — C54.1 ENDOMETRIAL CANCER (HCC): Primary | ICD-10-CM

## 2018-05-22 PROCEDURE — 99244 OFF/OP CNSLTJ NEW/EST MOD 40: CPT | Performed by: OBSTETRICS & GYNECOLOGY

## 2018-05-22 NOTE — ASSESSMENT & PLAN NOTE
-   Patient has complications from her morbid obesity including estrogen dependent cancer (endometrial cancer was ), bilateral knee osteoarthritis, etc   I have emphasized today the potential benefits of weight loss as does relate to her cancer prognosis but more stim poorly her overall health  Patient is agreeable to explore options for weight loss  Will refer her to Medical weight management program at [address] [place] [address]

## 2018-05-22 NOTE — ASSESSMENT & PLAN NOTE
-   She has no evidence of disease  Discussed warning signs  She will contact me if she develops any vaginal bleeding, drainage or discharge  We plan to see her back in 3-4 months for routine surveillance visit

## 2018-05-22 NOTE — PROGRESS NOTES
Assessment/Plan:    Problem List Items Addressed This Visit        Genitourinary    Endometrial cancer ([facility] [address] )     -   She has no evidence of disease  Discussed warning signs  She will contact me if she develops any vaginal bleeding, drainage or discharge  We plan to see her back in 3-4 months for routine surveillance visit  Other    Morbid obesity with BMI of 60 0-69 9, adult ([facility] [address] ) - Primary (Chronic)     -   Patient has complications from her morbid obesity including estrogen dependent cancer (endometrial cancer was ), bilateral knee osteoarthritis, etc   I have emphasized today the potential benefits of weight loss as does relate to her cancer prognosis but more stim poorly her overall health  Patient is agreeable to explore options for weight loss  Will refer her to Medical weight management program at [address] [place] [address]  CHIEF COMPLAINT:       Routine endometrial cancer surveillance  Previous therapy:     Endometrial cancer ([facility] [address] )    8/15/2017 Initial Diagnosis     D&C by Dr Shasta aPulson at [facility] on August 15, 2017: Endocervical curettage with atypical glandular cells cannot rule out adenocarcinoma  Chronic cervicitis  Endometrial curettings with FIGO grade 1 adenocarcinoma arising on complex hyperplasia with and without atypia  Review by [facility] pathology pending  10/5/2017 Surgery     Robotic hysterectomy, bilateral salpingo-oophorectomy, cystoscopy  Final pathology consistent with FIGO grade 1 endometrioid endometrial adenocarcinoma, 3 4 cm tumor, no evidence of myometrial invasion, evidence of simple hyperplasia with atypia, negative pelvics, no lymphovascular invasion, negative cervix, negative fallopian tubes and ovaries  At least stage IA grade 1  Patient ID: Pelon Caceres is a 62 y o  female  HPI    Morbidly obese 45-year-old female with likely stage IA grade 1 endometrial cancer  She presents for routine surveillance visit    Denies vaginal bleeding, drainage or discharge  Complains of bilateral knee pain at baseline from her known osteoarthritis  No changes in bowel or bladder function  Denies pelvic or abdominal pain  Her incisions have all healed well  The following portions of the patient's history were reviewed and updated as appropriate: allergies, current medications, past family history, past medical history, past social history, past surgical history and problem list     Review of Systems    As above  Twelve point review of systems is o/w unremarkable  Current Outpatient Prescriptions   Medication Sig Dispense Refill    aspirin (ECOTRIN LOW STRENGTH) 81 mg EC tablet Take 81 mg by mouth daily      Multiple Vitamin (MULTIVITAMIN) tablet Take 1 tablet by mouth daily      valsartan-hydrochlorothiazide (DIOVAN-HCT) 160-12 5 MG per tablet Take 1 tablet by mouth daily      cyclobenzaprine (FLEXERIL) 10 mg tablet Take 1 tablet (10 mg total) by mouth 3 (three) times a day as needed for muscle spasms for up to 5 days 15 tablet 0     No current facility-administered medications for this visit  Objective:    Blood pressure 146/82, pulse (!) 115, resp  rate 22, height 6' (1 829 m), weight (!) 205 kg (453 lb)  Body mass index is 61 44 kg/m²  Body surface area is 3 01 meters squared  Physical Exam   Constitutional: She is oriented to person, place, and time  Morbidly obese  HENT:   Head: Normocephalic and atraumatic  Neck: Normal range of motion  Neck supple  No thyromegaly present  Cardiovascular: Normal rate and regular rhythm  No murmur heard  Pulmonary/Chest: Effort normal  No respiratory distress  She has no wheezes  Abdominal: Soft  She exhibits no distension and no mass  There is no rebound  Genitourinary:   Genitourinary Comments:   Normal external genitalia  Vulva and vagina with no lesions  Vaginal cuff with tiny area of granulation tissue at the right angle    Treated and resolved with silver nitrate  No tumor  Bimanual exam is negative for masses  Uterus, cervix and bilateral fallopian tubes and ovaries are surgically absent  Musculoskeletal: Normal range of motion  She exhibits no edema  Lymphadenopathy:     She has no cervical adenopathy  Neurological: She is alert and oriented to person, place, and time  Skin: Skin is warm and dry  No rash noted  Psychiatric: She has a normal mood and affect  Her behavior is normal    Vitals reviewed      Elizabeth Wallace MD, Bony Mims, [facility]  5/22/2018  11:34 AM          No results found for:   Lab Results   Component Value Date     09/21/2017    K 3 9 09/21/2017    CL 99 (L) 09/21/2017    CO2 30 09/21/2017    ANIONGAP 9 09/21/2017    BUN 20 09/21/2017    CREATININE 0 90 09/21/2017    GLUCOSE 122 09/21/2017    CALCIUM 9 1 09/21/2017    AST 18 09/21/2017    ALT 38 09/21/2017    ALKPHOS 140 (H) 09/21/2017    PROT 8 0 09/21/2017    BILITOT 1 00 09/21/2017    EGFR 71 09/21/2017     Lab Results   Component Value Date    WBC 6 44 09/21/2017    HGB 14 8 09/21/2017    HCT 45 2 09/21/2017    MCV 90 09/21/2017     09/21/2017     Lab Results   Component Value Date    NEUTROABS 3 75 09/21/2017

## 2018-05-22 NOTE — PATIENT INSTRUCTIONS
-   Contact us immediately if she develops any vaginal bleeding, drainage or discharge  -   Keep appointment for weight management consultation visit

## 2018-05-22 NOTE — LETTER
May 22, 2018     Ajit Baig [facility] [address]    Patient: Gilbert Hernandez   YOB: 1960   Date of Visit: 5/22/2018       Dear Dr Jose Strange: Thank you for referring Deanmateus Savage to me for evaluation  Below are my notes for this consultation  If you have questions, please do not hesitate to call me  I look forward to following your patient along with you  Sincerely,        Michael Miller MD        CC: No Recipients  Michael Miller MD  5/22/2018 11:34 AM  Sign at close encounter  Assessment/Plan:    Problem List Items Addressed This Visit        Genitourinary    Endometrial cancer [facility])     -   She has no evidence of disease  Discussed warning signs  She will contact me if she develops any vaginal bleeding, drainage or discharge  We plan to see her back in 3-4 months for routine surveillance visit  Other    Morbid obesity with BMI of 60 0-69 9, adult ([facility] [address] ) - Primary (Chronic)     -   Patient has complications from her morbid obesity including estrogen dependent cancer (endometrial cancer was ), bilateral knee osteoarthritis, etc   I have emphasized today the potential benefits of weight loss as does relate to her cancer prognosis but more stim poorly her overall health  Patient is agreeable to explore options for weight loss  Will refer her to Medical weight management program at [facility]  CHIEF COMPLAINT:       Routine endometrial cancer surveillance  Previous therapy:     Endometrial cancer ([facility] [address] )    8/15/2017 Initial Diagnosis     D&C by Dr Terri Chatman at [facility] on August 15, 2017: Endocervical curettage with atypical glandular cells cannot rule out adenocarcinoma  Chronic cervicitis  Endometrial curettings with FIGO grade 1 adenocarcinoma arising on complex hyperplasia with and without atypia  Review by [facility] pathology pending           10/5/2017 Surgery     Robotic hysterectomy, bilateral salpingo-oophorectomy, cystoscopy  Final pathology consistent with FIGO grade 1 endometrioid endometrial adenocarcinoma, 3 4 cm tumor, no evidence of myometrial invasion, evidence of simple hyperplasia with atypia, negative pelvics, no lymphovascular invasion, negative cervix, negative fallopian tubes and ovaries  At least stage IA grade 1  Patient ID: Osmel Rodriguez is a 62 y o  female  HPI    Morbidly obese 80-year-old female with likely stage IA grade 1 endometrial cancer  She presents for routine surveillance visit  Denies vaginal bleeding, drainage or discharge  Complains of bilateral knee pain at baseline from her known osteoarthritis  No changes in bowel or bladder function  Denies pelvic or abdominal pain  Her incisions have all healed well  The following portions of the patient's history were reviewed and updated as appropriate: allergies, current medications, past family history, past medical history, past social history, past surgical history and problem list     Review of Systems    As above  Twelve point review of systems is o/w unremarkable  Current Outpatient Prescriptions   Medication Sig Dispense Refill    aspirin (ECOTRIN LOW STRENGTH) 81 mg EC tablet Take 81 mg by mouth daily      Multiple Vitamin (MULTIVITAMIN) tablet Take 1 tablet by mouth daily      valsartan-hydrochlorothiazide (DIOVAN-HCT) 160-12 5 MG per tablet Take 1 tablet by mouth daily      cyclobenzaprine (FLEXERIL) 10 mg tablet Take 1 tablet (10 mg total) by mouth 3 (three) times a day as needed for muscle spasms for up to 5 days 15 tablet 0     No current facility-administered medications for this visit  Objective:    Blood pressure 146/82, pulse (!) 115, resp  rate 22, height 6' (1 829 m), weight (!) 205 kg (453 lb)  Body mass index is 61 44 kg/m²  Body surface area is 3 01 meters squared  Physical Exam   Constitutional: She is oriented to person, place, and time  Morbidly obese     HENT: Head: Normocephalic and atraumatic  Neck: Normal range of motion  Neck supple  No thyromegaly present  Cardiovascular: Normal rate and regular rhythm  No murmur heard  Pulmonary/Chest: Effort normal  No respiratory distress  She has no wheezes  Abdominal: Soft  She exhibits no distension and no mass  There is no rebound  Genitourinary:   Genitourinary Comments:   Normal external genitalia  Vulva and vagina with no lesions  Vaginal cuff with tiny area of granulation tissue at the right angle  Treated and resolved with silver nitrate  No tumor  Bimanual exam is negative for masses  Uterus, cervix and bilateral fallopian tubes and ovaries are surgically absent  Musculoskeletal: Normal range of motion  She exhibits no edema  Lymphadenopathy:     She has no cervical adenopathy  Neurological: She is alert and oriented to person, place, and time  Skin: Skin is warm and dry  No rash noted  Psychiatric: She has a normal mood and affect  Her behavior is normal    Vitals reviewed      Christian Olmos MD, [address] [place] [address]  5/22/2018  11:34 AM          No results found for:   Lab Results   Component Value Date     09/21/2017    K 3 9 09/21/2017    CL 99 (L) 09/21/2017    CO2 30 09/21/2017    ANIONGAP 9 09/21/2017    BUN 20 09/21/2017    CREATININE 0 90 09/21/2017    GLUCOSE 122 09/21/2017    CALCIUM 9 1 09/21/2017    AST 18 09/21/2017    ALT 38 09/21/2017    ALKPHOS 140 (H) 09/21/2017    PROT 8 0 09/21/2017    BILITOT 1 00 09/21/2017    EGFR 71 09/21/2017     Lab Results   Component Value Date    WBC 6 44 09/21/2017    HGB 14 8 09/21/2017    HCT 45 2 09/21/2017    MCV 90 09/21/2017     09/21/2017     Lab Results   Component Value Date    NEUTROABS 3 75 09/21/2017

## 2018-06-11 LAB — SCAN RESULT: NORMAL

## 2018-09-25 ENCOUNTER — OFFICE VISIT (OUTPATIENT)
Dept: GYNECOLOGIC ONCOLOGY | Facility: CLINIC | Age: 58
End: 2018-09-25
Payer: COMMERCIAL

## 2018-09-25 VITALS
HEIGHT: 72 IN | DIASTOLIC BLOOD PRESSURE: 68 MMHG | HEART RATE: 86 BPM | RESPIRATION RATE: 16 BRPM | WEIGHT: 293 LBS | BODY MASS INDEX: 39.68 KG/M2 | SYSTOLIC BLOOD PRESSURE: 124 MMHG | TEMPERATURE: 97.6 F

## 2018-09-25 DIAGNOSIS — C54.1 ENDOMETRIAL CANCER (HCC): Primary | ICD-10-CM

## 2018-09-25 DIAGNOSIS — E66.01 MORBID OBESITY WITH BMI OF 60.0-69.9, ADULT (HCC): Chronic | ICD-10-CM

## 2018-09-25 PROCEDURE — 99213 OFFICE O/P EST LOW 20 MIN: CPT | Performed by: OBSTETRICS & GYNECOLOGY

## 2018-09-25 NOTE — PATIENT INSTRUCTIONS
-   Contact us if you have vaginal bleeding, drainage, discharge, changes in bowel or bladder function or any new symptoms

## 2018-09-25 NOTE — ASSESSMENT & PLAN NOTE
-   No clinical evidence of disease  We will plan to see her back in 6 months for routine surveillance visit  She will contact me sooner if she develops any vaginal bleeding, drainage, discharge or any new symptoms

## 2018-09-25 NOTE — ASSESSMENT & PLAN NOTE
-   Patient has been counseled on the various effects of morbid obesity and overall health and risk of endometrial cancer recurrence  Discussed consideration for referral to weight loss management program   Will continue to revisit at subsequent visits

## 2018-09-25 NOTE — PROGRESS NOTES
Assessment/Plan:    Problem List Items Addressed This Visit        Genitourinary    Endometrial cancer ([facility] [address] ) - Primary     -   No clinical evidence of disease  We will plan to see her back in 6 months for routine surveillance visit  She will contact me sooner if she develops any vaginal bleeding, drainage, discharge or any new symptoms  Other    Morbid obesity with BMI of 60 0-69 9, adult ([address] ) (Chronic)     -   Patient has been counseled on the various effects of morbid obesity and overall health and risk of endometrial cancer recurrence  Discussed consideration for referral to weight loss management program   Will continue to revisit at subsequent visits  CHIEF COMPLAINT:      endometrial cancer surveillance visit  Problem:  Cancer Staging  Endometrial cancer [facility])  Staging form: Corpus Uteri - Carcinoma, AJCC 7th Edition  - Clinical stage from 10/5/2017: FIGO Stage IA, calculated as Stage Unknown (T1a, NX, M0) - Signed by Manjit Garza MD on 9/25/2018        Previous therapy:     Endometrial cancer ([facility] [address] )    8/15/2017 Initial Diagnosis     D&C by Dr Dereje Brito at [facility] on August 15, 2017: Endocervical curettage with atypical glandular cells cannot rule out adenocarcinoma  Chronic cervicitis  Endometrial curettings with FIGO grade 1 adenocarcinoma arising on complex hyperplasia with and without atypia  Review by [address] [place] [address]'s pathology pending  10/5/2017 Surgery     Robotic hysterectomy, bilateral salpingo-oophorectomy, cystoscopy  Final pathology consistent with FIGO grade 1 endometrioid endometrial adenocarcinoma, 3 4 cm tumor, no evidence of myometrial invasion, evidence of simple hyperplasia with atypia, negative pelvics, no lymphovascular invasion, negative cervix, negative fallopian tubes and ovaries  At least stage IA grade 1                Patient ID: Melinda King is a 62 y o  female  HPI    Patient with a staged, likely stage IA grade 1 low risk endometrial cancer  She presents today for surveillance visit  Denies vaginal bleeding, drainage or discharge  Denies abdominal or pelvic pain  Reports normal bowel and bladder function  The following portions of the patient's history were reviewed and updated as appropriate: allergies, current medications, past family history, past medical history, past social history, past surgical history and problem list     Review of Systems    As above  Twelve point review of systems is unremarkable  Current Outpatient Prescriptions   Medication Sig Dispense Refill    aspirin (ECOTRIN LOW STRENGTH) 81 mg EC tablet Take 81 mg by mouth daily      Multiple Vitamin (MULTIVITAMIN) tablet Take 1 tablet by mouth daily      valsartan-hydrochlorothiazide (DIOVAN-HCT) 160-12 5 MG per tablet Take 1 tablet by mouth daily      cyclobenzaprine (FLEXERIL) 10 mg tablet Take 1 tablet (10 mg total) by mouth 3 (three) times a day as needed for muscle spasms for up to 5 days 15 tablet 0     No current facility-administered medications for this visit  Objective:    Blood pressure 124/68, pulse 86, temperature 97 6 °F (36 4 °C), temperature source Oral, resp  rate 16, height 6' (1 829 m), weight (!) 202 kg (446 lb)  Body mass index is 60 49 kg/m²  Body surface area is 2 99 meters squared  Physical Exam   Constitutional: She is oriented to person, place, and time  She appears well-developed and well-nourished  HENT:   Head: Normocephalic and atraumatic  Mouth/Throat: No oropharyngeal exudate  Eyes: Conjunctivae are normal  Right eye exhibits no discharge  Left eye exhibits no discharge  No scleral icterus  Neck: Normal range of motion  Neck supple  No tracheal deviation present  No thyromegaly present  Cardiovascular: Normal rate, regular rhythm and normal heart sounds  Exam reveals no gallop  No murmur heard  Pulmonary/Chest: Effort normal and breath sounds normal  No respiratory distress   She has no wheezes  She has no rhonchi  She has no rales  Abdominal: Soft  Bowel sounds are normal  She exhibits no distension and no mass  There is no tenderness  There is no rebound and no guarding  Hernia confirmed negative in the right inguinal area and confirmed negative in the left inguinal area  Morbidly obese  Genitourinary: There is no rash or lesion on the right labia  There is no rash or lesion on the left labia  Uterus is not enlarged and not tender  Cervix exhibits no motion tenderness, no discharge and no friability  Right adnexum displays no mass, no tenderness and no fullness  Left adnexum displays no mass, no tenderness and no fullness  No bleeding in the vagina  Genitourinary Comments: Normal external female genitalia, normal Bartholin's and [place]'s glands  Normal urethral meatus  No lesions notes  Bladder without fullness mass or tenderness  Vagina without lesion or discharge  No significant cystocele or rectocele noted  Cervix and uterus surgically absent  Bimanual exam negative for masses  Anus without fissure of lesion  Musculoskeletal: She exhibits no edema or tenderness  Lymphadenopathy:     She has no cervical adenopathy  Right: No inguinal adenopathy present  Left: No inguinal adenopathy present  Neurological: She is alert and oriented to person, place, and time  Skin: Skin is dry  Psychiatric: She has a normal mood and affect  Her behavior is normal  Judgment and thought content normal    Vitals reviewed      Javier Ames MD, [address] [place] [address]  9/25/2018  10:10 AM

## 2018-11-06 ENCOUNTER — TRANSCRIBE ORDERS (OUTPATIENT)
Dept: LAB | Facility: MEDICAL CENTER | Age: 58
End: 2018-11-06

## 2018-11-06 ENCOUNTER — APPOINTMENT (OUTPATIENT)
Dept: LAB | Facility: MEDICAL CENTER | Age: 58
End: 2018-11-06
Payer: COMMERCIAL

## 2018-11-06 DIAGNOSIS — N39.0 URINARY TRACT INFECTION IN FEMALE: Primary | ICD-10-CM

## 2018-11-06 PROCEDURE — 87077 CULTURE AEROBIC IDENTIFY: CPT

## 2018-11-06 PROCEDURE — 87186 SC STD MICRODIL/AGAR DIL: CPT

## 2018-11-06 PROCEDURE — 87086 URINE CULTURE/COLONY COUNT: CPT

## 2018-11-08 LAB
BACTERIA UR CULT: ABNORMAL
BACTERIA UR CULT: ABNORMAL

## 2018-11-29 ENCOUNTER — APPOINTMENT (OUTPATIENT)
Dept: LAB | Facility: HOSPITAL | Age: 58
End: 2018-11-29
Payer: COMMERCIAL

## 2018-11-29 ENCOUNTER — TRANSCRIBE ORDERS (OUTPATIENT)
Dept: ADMINISTRATIVE | Facility: HOSPITAL | Age: 58
End: 2018-11-29

## 2018-11-29 ENCOUNTER — HOSPITAL ENCOUNTER (OUTPATIENT)
Dept: RADIOLOGY | Facility: HOSPITAL | Age: 58
Discharge: HOME | End: 2018-11-29
Payer: COMMERCIAL

## 2018-11-29 DIAGNOSIS — M54.9 BACK PAIN, UNSPECIFIED BACK LOCATION, UNSPECIFIED BACK PAIN LATERALITY, UNSPECIFIED CHRONICITY: ICD-10-CM

## 2018-11-29 DIAGNOSIS — I10 HYPERTENSION, UNSPECIFIED TYPE: ICD-10-CM

## 2018-11-29 DIAGNOSIS — I10 HYPERTENSION, UNSPECIFIED TYPE: Primary | ICD-10-CM

## 2018-11-29 LAB
25(OH)D3 SERPL-MCNC: 27 NG/ML (ref 30–100)
ALBUMIN SERPL BCP-MCNC: 4 G/DL (ref 3.5–5.7)
ALP SERPL-CCNC: 119 U/L (ref 40–150)
ALT SERPL W P-5'-P-CCNC: 18 U/L (ref 7–52)
ANION GAP SERPL CALCULATED.3IONS-SCNC: 8 MMOL/L (ref 4–13)
AST SERPL W P-5'-P-CCNC: 13 U/L (ref 13–39)
BASOPHILS # BLD AUTO: 0 THOUSANDS/ΜL (ref 0–0.1)
BASOPHILS NFR BLD AUTO: 1 % (ref 0–2)
BILIRUB SERPL-MCNC: 0.8 MG/DL (ref 0.2–1)
BUN SERPL-MCNC: 18 MG/DL (ref 7–25)
CALCIUM SERPL-MCNC: 9.6 MG/DL (ref 8.6–10.5)
CHLORIDE SERPL-SCNC: 99 MMOL/L (ref 98–107)
CHOLEST SERPL-MCNC: 182 MG/DL (ref 0–200)
CO2 SERPL-SCNC: 30 MMOL/L (ref 21–31)
CREAT SERPL-MCNC: 0.72 MG/DL (ref 0.6–1.2)
CREAT UR-MCNC: 133 MG/DL
EOSINOPHIL # BLD AUTO: 0.2 THOUSAND/ΜL (ref 0–0.61)
EOSINOPHIL NFR BLD AUTO: 3 % (ref 0–5)
ERYTHROCYTE [DISTWIDTH] IN BLOOD BY AUTOMATED COUNT: 13.7 % (ref 11.5–14.5)
EST. AVERAGE GLUCOSE BLD GHB EST-MCNC: 137 MG/DL
GFR SERPL CREATININE-BSD FRML MDRD: 93 ML/MIN/1.73SQ M
GLUCOSE P FAST SERPL-MCNC: 132 MG/DL (ref 65–99)
HBA1C MFR BLD: 6.4 % (ref 4.2–6.3)
HCT VFR BLD AUTO: 45.7 % (ref 34.8–46.1)
HDLC SERPL-MCNC: 54 MG/DL (ref 40–60)
HGB BLD-MCNC: 14.8 G/DL (ref 12–16)
LDLC SERPL CALC-MCNC: 111 MG/DL (ref 75–193)
LYMPHOCYTES # BLD AUTO: 1.8 THOUSANDS/ΜL (ref 0.6–4.47)
LYMPHOCYTES NFR BLD AUTO: 29 % (ref 21–51)
MCH RBC QN AUTO: 29 PG (ref 26–34)
MCHC RBC AUTO-ENTMCNC: 32.3 G/DL (ref 31–37)
MCV RBC AUTO: 90 FL (ref 81–99)
MICROALBUMIN UR-MCNC: 7.4 MG/L (ref 0–20)
MICROALBUMIN/CREAT 24H UR: 6 MG/G CREATININE (ref 0–30)
MONOCYTES # BLD AUTO: 0.4 THOUSAND/ΜL (ref 0.17–1.22)
MONOCYTES NFR BLD AUTO: 7 % (ref 2–12)
NEUTROPHILS # BLD AUTO: 3.9 THOUSANDS/ΜL (ref 1.4–6.5)
NEUTS SEG NFR BLD AUTO: 61 % (ref 42–75)
NONHDLC SERPL-MCNC: 128 MG/DL
NRBC BLD AUTO-RTO: 0 /100 WBCS
PLATELET # BLD AUTO: 268 THOUSANDS/UL (ref 149–390)
PMV BLD AUTO: 7.7 FL (ref 8.6–11.7)
POTASSIUM SERPL-SCNC: 3.8 MMOL/L (ref 3.5–5.5)
PROT SERPL-MCNC: 7.8 G/DL (ref 6.4–8.9)
RBC # BLD AUTO: 5.1 MILLION/UL (ref 3.9–5.2)
SODIUM SERPL-SCNC: 137 MMOL/L (ref 134–143)
TRIGL SERPL-MCNC: 86 MG/DL (ref 44–166)
WBC # BLD AUTO: 6.4 THOUSAND/UL (ref 4.8–10.8)

## 2018-11-29 PROCEDURE — 80053 COMPREHEN METABOLIC PANEL: CPT

## 2018-11-29 PROCEDURE — 82306 VITAMIN D 25 HYDROXY: CPT

## 2018-11-29 PROCEDURE — 36415 COLL VENOUS BLD VENIPUNCTURE: CPT

## 2018-11-29 PROCEDURE — 85025 COMPLETE CBC W/AUTO DIFF WBC: CPT

## 2018-11-29 PROCEDURE — 82570 ASSAY OF URINE CREATININE: CPT | Performed by: NURSE PRACTITIONER

## 2018-11-29 PROCEDURE — 80061 LIPID PANEL: CPT

## 2018-11-29 PROCEDURE — 82043 UR ALBUMIN QUANTITATIVE: CPT | Performed by: NURSE PRACTITIONER

## 2018-11-29 PROCEDURE — 83036 HEMOGLOBIN GLYCOSYLATED A1C: CPT

## 2018-11-29 PROCEDURE — 72110 X-RAY EXAM L-2 SPINE 4/>VWS: CPT

## 2019-03-26 ENCOUNTER — OFFICE VISIT (OUTPATIENT)
Dept: GYNECOLOGIC ONCOLOGY | Facility: CLINIC | Age: 59
End: 2019-03-26
Payer: COMMERCIAL

## 2019-03-26 VITALS
BODY MASS INDEX: 39.68 KG/M2 | SYSTOLIC BLOOD PRESSURE: 152 MMHG | HEIGHT: 72 IN | WEIGHT: 293 LBS | RESPIRATION RATE: 18 BRPM | HEART RATE: 96 BPM | DIASTOLIC BLOOD PRESSURE: 82 MMHG

## 2019-03-26 DIAGNOSIS — Z08 ENCOUNTER FOR FOLLOW-UP SURVEILLANCE OF ENDOMETRIAL CANCER: Primary | ICD-10-CM

## 2019-03-26 DIAGNOSIS — Z85.42 ENCOUNTER FOR FOLLOW-UP SURVEILLANCE OF ENDOMETRIAL CANCER: Primary | ICD-10-CM

## 2019-03-26 PROCEDURE — 99212 OFFICE O/P EST SF 10 MIN: CPT | Performed by: OBSTETRICS & GYNECOLOGY

## 2019-03-26 NOTE — ASSESSMENT & PLAN NOTE
Patient has no evidence of disease  I plan to see her back in 6 months  She will contact me if she has any new symptoms

## 2019-03-26 NOTE — PROGRESS NOTES
Assessment/Plan:    Problem List Items Addressed This Visit        Genitourinary    Encounter for follow-up surveillance of endometrial cancer - Primary     Patient has no evidence of disease  I plan to see her back in 6 months  She will contact me if she has any new symptoms  CHIEF COMPLAINT:     Routine surveillance visit for history of endometrial cancer  Problem:  Cancer Staging  Encounter for follow-up surveillance of endometrial cancer  Staging form: Corpus Uteri - Carcinoma, AJCC 7th Edition  - Clinical stage from 10/5/2017: FIGO Stage IA, calculated as Stage Unknown (T1a, NX, M0) - Signed by Gume Bradley MD on 9/25/2018        Previous therapy:     Encounter for follow-up surveillance of endometrial cancer    8/15/2017 Initial Diagnosis     D&C by Dr Lauren Suazo at [facility] on August 15, 2017: Endocervical curettage with atypical glandular cells cannot rule out adenocarcinoma  Chronic cervicitis  Endometrial curettings with FIGO grade 1 adenocarcinoma arising on complex hyperplasia with and without atypia  Review by [facility] pathology pending  10/5/2017 Surgery     Robotic hysterectomy, bilateral salpingo-oophorectomy, cystoscopy  Final pathology consistent with FIGO grade 1 endometrioid endometrial adenocarcinoma, 3 4 cm tumor, no evidence of myometrial invasion, evidence of simple hyperplasia with atypia, negative pelvics, no lymphovascular invasion, negative cervix, negative fallopian tubes and ovaries  At least stage IA grade 1  Patient ID: Emanuel Metzger is a 62 y o  female  HPI  Patient presents for routine follow-up  Denies vaginal bleeding, drainage or discharge  Reports normal bowel bladder function  Denies pelvic pain    The following portions of the patient's history were reviewed and updated as appropriate: allergies, current medications, past family history, past medical history, past social history, past surgical history and problem list     Review of Systems  As above  Diffuse hip and knee pain due to known history of osteoarthritis  Otherwise 12 point review of systems unremarkable  Current Outpatient Medications   Medication Sig Dispense Refill    aspirin (ECOTRIN LOW STRENGTH) 81 mg EC tablet Take 81 mg by mouth daily      metFORMIN (GLUCOPHAGE) 500 mg tablet Take 500 mg by mouth 2 (two) times a day with meals      Multiple Vitamin (MULTIVITAMIN) tablet Take 1 tablet by mouth daily      valsartan-hydrochlorothiazide (DIOVAN-HCT) 160-12 5 MG per tablet Take 1 tablet by mouth daily       No current facility-administered medications for this visit  Objective:    Blood pressure 152/82, pulse 96, resp  rate 18, height 6' (1 829 m), weight (!) 196 kg (431 lb)  Body mass index is 58 45 kg/m²  Body surface area is 2 96 meters squared  Physical Exam   Constitutional: She is oriented to person, place, and time  She appears well-developed and well-nourished  HENT:   Head: Normocephalic and atraumatic  Neck: Normal range of motion  Neck supple  No thyromegaly present  Pulmonary/Chest: Effort normal    Abdominal: Soft  She exhibits no distension and no mass  There is no rebound  Genitourinary:   Genitourinary Comments: The external female genitalia is normal  The bartholin's, uretheral and skenes glands are normal  The urethral meatus is normal (midline with no lesions)  Anus without fissure or lesion  Speculum exam reveals a grossly normal vagina  No masses, lesions, discharge or bleeding  No significant cystocele or rectocele noted  Bimanual exam notes a surgical absent cervix, uterus and adnexal structures  No masses or fullness  Bladder is without fullness, mass or tenderness  Musculoskeletal: Normal range of motion  She exhibits no edema  Lymphadenopathy:     She has no cervical adenopathy  Neurological: She is alert and oriented to person, place, and time  Skin: Skin is warm and dry  No rash noted  Psychiatric: She has a normal mood and affect  Her behavior is normal    Vitals reviewed      Zana Blanchard MD, [address] [address]  3/26/2019  11:25 AM

## 2019-03-26 NOTE — LETTER
March 26, 2019     Rommel Weathers [place] [address]    Patient: Nannette Bourne   YOB: 1960   Date of Visit: 3/26/2019       Dear Dr Luz Pettit: Thank you for referring Shayna Sanchez to me for evaluation  Below are my notes for this consultation  If you have questions, please do not hesitate to call me  I look forward to following your patient along with you  Sincerely,        Hilaria Benson MD        CC: No Recipients  Hilaria Benson MD  3/26/2019 11:25 AM  Sign at close encounter  Assessment/Plan:    Problem List Items Addressed This Visit        Genitourinary    Encounter for follow-up surveillance of endometrial cancer - Primary     Patient has no evidence of disease  I plan to see her back in 6 months  She will contact me if she has any new symptoms  CHIEF COMPLAINT:     Routine surveillance visit for history of endometrial cancer  Problem:  Cancer Staging  Encounter for follow-up surveillance of endometrial cancer  Staging form: Corpus Uteri - Carcinoma, AJCC 7th Edition  - Clinical stage from 10/5/2017: FIGO Stage IA, calculated as Stage Unknown (T1a, NX, M0) - Signed by Hilaria Benson MD on 9/25/2018        Previous therapy:     Encounter for follow-up surveillance of endometrial cancer    8/15/2017 Initial Diagnosis     D&C by Dr Bony Todd at [facility] on August 15, 2017: Endocervical curettage with atypical glandular cells cannot rule out adenocarcinoma  Chronic cervicitis  Endometrial curettings with FIGO grade 1 adenocarcinoma arising on complex hyperplasia with and without atypia  Review by [facility] pathology pending  10/5/2017 Surgery     Robotic hysterectomy, bilateral salpingo-oophorectomy, cystoscopy   Final pathology consistent with FIGO grade 1 endometrioid endometrial adenocarcinoma, 3 4 cm tumor, no evidence of myometrial invasion, evidence of simple hyperplasia with atypia, negative pelvics, no lymphovascular invasion, negative cervix, negative fallopian tubes and ovaries  At least stage IA grade 1  Patient ID: Brennon Coleman is a 62 y o  female  HPI  Patient presents for routine follow-up  Denies vaginal bleeding, drainage or discharge  Reports normal bowel bladder function  Denies pelvic pain  The following portions of the patient's history were reviewed and updated as appropriate: allergies, current medications, past family history, past medical history, past social history, past surgical history and problem list     Review of Systems  As above  Diffuse hip and knee pain due to known history of osteoarthritis  Otherwise 12 point review of systems unremarkable  Current Outpatient Medications   Medication Sig Dispense Refill    aspirin (ECOTRIN LOW STRENGTH) 81 mg EC tablet Take 81 mg by mouth daily      metFORMIN (GLUCOPHAGE) 500 mg tablet Take 500 mg by mouth 2 (two) times a day with meals      Multiple Vitamin (MULTIVITAMIN) tablet Take 1 tablet by mouth daily      valsartan-hydrochlorothiazide (DIOVAN-HCT) 160-12 5 MG per tablet Take 1 tablet by mouth daily       No current facility-administered medications for this visit  Objective:    Blood pressure 152/82, pulse 96, resp  rate 18, height 6' (1 829 m), weight (!) 196 kg (431 lb)  Body mass index is 58 45 kg/m²  Body surface area is 2 96 meters squared  Physical Exam   Constitutional: She is oriented to person, place, and time  She appears well-developed and well-nourished  HENT:   Head: Normocephalic and atraumatic  Neck: Normal range of motion  Neck supple  No thyromegaly present  Pulmonary/Chest: Effort normal    Abdominal: Soft  She exhibits no distension and no mass  There is no rebound  Genitourinary:   Genitourinary Comments: The external female genitalia is normal  The bartholin's, uretheral and skenes glands are normal  The urethral meatus is normal (midline with no lesions)   Anus without fissure or lesion  Speculum exam reveals a grossly normal vagina  No masses, lesions, discharge or bleeding  No significant cystocele or rectocele noted  Bimanual exam notes a surgical absent cervix, uterus and adnexal structures  No masses or fullness  Bladder is without fullness, mass or tenderness  Musculoskeletal: Normal range of motion  She exhibits no edema  Lymphadenopathy:     She has no cervical adenopathy  Neurological: She is alert and oriented to person, place, and time  Skin: Skin is warm and dry  No rash noted  Psychiatric: She has a normal mood and affect  Her behavior is normal    Vitals reviewed      Stanley Colin MD, Scott Bledsoe  3/26/2019  11:25 AM

## 2019-04-24 ENCOUNTER — APPOINTMENT (OUTPATIENT)
Dept: LAB | Facility: MEDICAL CENTER | Age: 59
End: 2019-04-24
Payer: COMMERCIAL

## 2019-04-24 ENCOUNTER — TRANSCRIBE ORDERS (OUTPATIENT)
Dept: LAB | Facility: MEDICAL CENTER | Age: 59
End: 2019-04-24

## 2019-04-24 DIAGNOSIS — E13.8 OTHER SPECIFIED DIABETES MELLITUS WITH COMPLICATION, WITH LONG-TERM CURRENT USE OF INSULIN: ICD-10-CM

## 2019-04-24 DIAGNOSIS — Z79.4 OTHER SPECIFIED DIABETES MELLITUS WITH COMPLICATION, WITH LONG-TERM CURRENT USE OF INSULIN: ICD-10-CM

## 2019-04-24 DIAGNOSIS — R73.9 HYPERGLYCEMIA: ICD-10-CM

## 2019-04-24 DIAGNOSIS — R73.9 HYPERGLYCEMIA: Primary | ICD-10-CM

## 2019-04-24 LAB
ANION GAP SERPL CALCULATED.3IONS-SCNC: 3 MMOL/L (ref 4–13)
BUN SERPL-MCNC: 24 MG/DL (ref 5–25)
CALCIUM SERPL-MCNC: 9 MG/DL (ref 8.3–10.1)
CHLORIDE SERPL-SCNC: 102 MMOL/L (ref 100–108)
CO2 SERPL-SCNC: 29 MMOL/L (ref 21–32)
CREAT SERPL-MCNC: 0.9 MG/DL (ref 0.6–1.3)
EST. AVERAGE GLUCOSE BLD GHB EST-MCNC: 123 MG/DL
GFR SERPL CREATININE-BSD FRML MDRD: 70 ML/MIN/1.73SQ M
GLUCOSE P FAST SERPL-MCNC: 109 MG/DL (ref 65–99)
HBA1C MFR BLD: 5.9 % (ref 4.2–6.3)
POTASSIUM SERPL-SCNC: 3.7 MMOL/L (ref 3.5–5.3)
SODIUM SERPL-SCNC: 134 MMOL/L (ref 136–145)

## 2019-04-24 PROCEDURE — 36415 COLL VENOUS BLD VENIPUNCTURE: CPT

## 2019-04-24 PROCEDURE — 80048 BASIC METABOLIC PNL TOTAL CA: CPT

## 2019-04-24 PROCEDURE — 83036 HEMOGLOBIN GLYCOSYLATED A1C: CPT

## 2019-10-08 ENCOUNTER — OFFICE VISIT (OUTPATIENT)
Dept: GYNECOLOGIC ONCOLOGY | Facility: CLINIC | Age: 59
End: 2019-10-08
Payer: COMMERCIAL

## 2019-10-08 VITALS
DIASTOLIC BLOOD PRESSURE: 80 MMHG | BODY MASS INDEX: 39.68 KG/M2 | HEIGHT: 72 IN | WEIGHT: 293 LBS | SYSTOLIC BLOOD PRESSURE: 132 MMHG | HEART RATE: 70 BPM

## 2019-10-08 DIAGNOSIS — Z08 ENCOUNTER FOR FOLLOW-UP SURVEILLANCE OF ENDOMETRIAL CANCER: Primary | ICD-10-CM

## 2019-10-08 DIAGNOSIS — E66.01 MORBID OBESITY WITH BMI OF 60.0-69.9, ADULT (HCC): Chronic | ICD-10-CM

## 2019-10-08 DIAGNOSIS — Z85.42 ENCOUNTER FOR FOLLOW-UP SURVEILLANCE OF ENDOMETRIAL CANCER: Primary | ICD-10-CM

## 2019-10-08 DIAGNOSIS — Z12.39 BREAST CANCER SCREENING: ICD-10-CM

## 2019-10-08 PROCEDURE — 99213 OFFICE O/P EST LOW 20 MIN: CPT | Performed by: OBSTETRICS & GYNECOLOGY

## 2019-10-08 RX ORDER — NAPROXEN 500 MG/1
TABLET ORAL
COMMUNITY
Start: 2019-09-03

## 2019-10-08 NOTE — PROGRESS NOTES
Assessment/Plan:    Problem List Items Addressed This Visit        Other    Morbid obesity with BMI of 60 0-69 9, adult ([facility] [address] ) (Chronic)     Patient has been counseled is aware of deleterious effects of morbid obesity on overall health and cancer risk  Encounter for follow-up surveillance of endometrial cancer - Primary     Patient is now more than 2 years from endometrial cancer surgery  She had a stage IA low risk tumor  Adjuvant therapy was never indicated  She has remained with no evidence of disease  At this time, she has decided she wishes to return to her primary gynecologist for yearly surveillance  There is no indication for anything other than yearly pelvic exams and immediate evaluation by us if any gross abnormalities, vaginal bleeding or new symptoms arise  Patient aware  All questions answered  Relevant Orders    Mammo screening bilateral w cad    Breast cancer screening     Overdue for screening mammogram   No breast masses or lumps  Screening mammogram recommended  Patient agrees  Referral made  Relevant Orders    Mammo screening bilateral w cad            CHIEF COMPLAINT:     Routine surveillance for history of endometrial cancer, overdue for breast cancer screening  Problem:  Cancer Staging  Encounter for follow-up surveillance of endometrial cancer  Staging form: Corpus Uteri - Carcinoma, AJCC 7th Edition  - Clinical stage from 10/5/2017: FIGO Stage IA, calculated as Stage Unknown (T1a, NX, M0) - Signed by Yossi Bolden MD on 9/25/2018        Previous therapy:     Encounter for follow-up surveillance of endometrial cancer    8/15/2017 Initial Diagnosis     D&C by Dr Emanuel Hernandez at [facility] on August 15, 2017: Endocervical curettage with atypical glandular cells cannot rule out adenocarcinoma  Chronic cervicitis  Endometrial curettings with FIGO grade 1 adenocarcinoma arising on complex hyperplasia with and without atypia   Review by [facility] pathology pending  10/5/2017 Surgery     Robotic hysterectomy, bilateral salpingo-oophorectomy, cystoscopy  Final pathology consistent with FIGO grade 1 endometrioid endometrial adenocarcinoma, 3 4 cm tumor, no evidence of myometrial invasion, evidence of simple hyperplasia with atypia, negative pelvics, no lymphovascular invasion, negative cervix, negative fallopian tubes and ovaries  At least stage IA grade 1  Patient ID: Rella Dance is a 61 y o  female  HPI  Patient is now 2 years out from robotic surgery for stage IA, low risk endometrial cancer  She has remained with no evidence of disease  Presents for disease surveillance  Denies vaginal bleeding, drainage or discharge  Normal bowel bladder function  Denies pelvic or abdominal pain  Denies breast masses, nipple discharge, etc   The following portions of the patient's history were reviewed and updated as appropriate: allergies, current medications, past family history, past medical history, past social history, past surgical history and problem list     Review of Systems  As above  Osteoarthritis, limited knee/hip mobility, ambulates with a cane  Twelve point review of systems otherwise unremarkable  Current Outpatient Medications   Medication Sig Dispense Refill    aspirin (ECOTRIN LOW STRENGTH) 81 mg EC tablet Take 81 mg by mouth daily      metFORMIN (GLUCOPHAGE) 500 mg tablet Take 500 mg by mouth 2 (two) times a day with meals      Multiple Vitamin (MULTIVITAMIN) tablet Take 1 tablet by mouth daily      naproxen (NAPROSYN) 500 mg tablet       valsartan-hydrochlorothiazide (DIOVAN-HCT) 160-12 5 MG per tablet Take 1 tablet by mouth daily       No current facility-administered medications for this visit  Objective:    Blood pressure 132/80, pulse 70, height 6' (1 829 m), weight (!) 194 kg (427 lb)  Body mass index is 57 91 kg/m²  Body surface area is 2 94 meters squared      Physical Exam   Constitutional: She is oriented to person, place, and time  She appears well-developed and well-nourished  HENT:   Head: Normocephalic and atraumatic  Neck: Normal range of motion  Neck supple  No thyromegaly present  Pulmonary/Chest: Effort normal    Abdominal: Soft  She exhibits no distension and no mass  There is no rebound  Genitourinary:   Genitourinary Comments: The external female genitalia is normal  The bartholin's, uretheral and skenes glands are normal  The urethral meatus is normal (midline with no lesions)  Anus without fissure or lesion  Speculum exam reveals a grossly normal vagina  No masses, lesions, discharge or bleeding  No significant cystocele or rectocele noted  Bimanual exam notes a surgical absent cervix, uterus and adnexal structures  No masses or fullness  Bladder is without fullness, mass or tenderness  Musculoskeletal: Normal range of motion  She exhibits no edema  Lymphadenopathy:     She has no cervical adenopathy  Neurological: She is alert and oriented to person, place, and time  Skin: Skin is warm and dry  No rash noted  Psychiatric: She has a normal mood and affect  Her behavior is normal    Vitals reviewed      Johsua Shah MD, [address] [place] [address]  10/8/2019  10:10 AM

## 2019-10-08 NOTE — ASSESSMENT & PLAN NOTE
Overdue for screening mammogram   No breast masses or lumps  Screening mammogram recommended  Patient agrees  Referral made

## 2019-10-08 NOTE — ASSESSMENT & PLAN NOTE
Patient is now more than 2 years from endometrial cancer surgery  She had a stage IA low risk tumor  Adjuvant therapy was never indicated  She has remained with no evidence of disease  At this time, she has decided she wishes to return to her primary gynecologist for yearly surveillance  There is no indication for anything other than yearly pelvic exams and immediate evaluation by us if any gross abnormalities, vaginal bleeding or new symptoms arise  Patient aware  All questions answered

## 2019-10-08 NOTE — ASSESSMENT & PLAN NOTE
Patient has been counseled is aware of deleterious effects of morbid obesity on overall health and cancer risk

## 2019-10-08 NOTE — LETTER
October 8, 2019     Jerrica Whitehead MD  [address] [place] [address]    Patient: Chanel Greenberg   YOB: 1960   Date of Visit: 10/8/2019       Dear Dr Jem Ramos: Thank you for referring Hamilton Domingo to me for evaluation  Below are my notes for this consultation  If you have questions, please do not hesitate to call me  I look forward to following your patient along with you  Sincerely,        Apryl Gill MD        CC: MD Apryl Garcia MD  10/8/2019 10:10 AM  Sign at close encounter  Assessment/Plan:    Problem List Items Addressed This Visit        Other    Morbid obesity with BMI of 60 0-69 9, adult ([facility] [address] ) (Chronic)     Patient has been counseled is aware of deleterious effects of morbid obesity on overall health and cancer risk  Encounter for follow-up surveillance of endometrial cancer - Primary     Patient is now more than 2 years from endometrial cancer surgery  She had a stage IA low risk tumor  Adjuvant therapy was never indicated  She has remained with no evidence of disease  At this time, she has decided she wishes to return to her primary gynecologist for yearly surveillance  There is no indication for anything other than yearly pelvic exams and immediate evaluation by us if any gross abnormalities, vaginal bleeding or new symptoms arise  Patient aware  All questions answered  Relevant Orders    Mammo screening bilateral w cad    Breast cancer screening     Overdue for screening mammogram   No breast masses or lumps  Screening mammogram recommended  Patient agrees  Referral made  Relevant Orders    Mammo screening bilateral w cad            CHIEF COMPLAINT:     Routine surveillance for history of endometrial cancer, overdue for breast cancer screening      Problem:  Cancer Staging  Encounter for follow-up surveillance of endometrial cancer  Staging form: Corpus Uteri - Carcinoma, AJCC 7th Edition  - Clinical stage from 10/5/2017: FIGO Stage IA, calculated as Stage Unknown (T1a, NX, M0) - Signed by Yossi Bolden MD on 9/25/2018        Previous therapy:     Encounter for follow-up surveillance of endometrial cancer    8/15/2017 Initial Diagnosis     D&C by Dr Emanuel Hernandez at [facility] on August 15, 2017: Endocervical curettage with atypical glandular cells cannot rule out adenocarcinoma  Chronic cervicitis  Endometrial curettings with FIGO grade 1 adenocarcinoma arising on complex hyperplasia with and without atypia  Review by [facility] pathology pending  10/5/2017 Surgery     Robotic hysterectomy, bilateral salpingo-oophorectomy, cystoscopy  Final pathology consistent with FIGO grade 1 endometrioid endometrial adenocarcinoma, 3 4 cm tumor, no evidence of myometrial invasion, evidence of simple hyperplasia with atypia, negative pelvics, no lymphovascular invasion, negative cervix, negative fallopian tubes and ovaries  At least stage IA grade 1  Patient ID: Rella Dance is a 61 y o  female  HPI  Patient is now 2 years out from robotic surgery for stage IA, low risk endometrial cancer  She has remained with no evidence of disease  Presents for disease surveillance  Denies vaginal bleeding, drainage or discharge  Normal bowel bladder function  Denies pelvic or abdominal pain  Denies breast masses, nipple discharge, etc   The following portions of the patient's history were reviewed and updated as appropriate: allergies, current medications, past family history, past medical history, past social history, past surgical history and problem list     Review of Systems  As above  Osteoarthritis, limited knee/hip mobility, ambulates with a cane  Twelve point review of systems otherwise unremarkable    Current Outpatient Medications   Medication Sig Dispense Refill    aspirin (ECOTRIN LOW STRENGTH) 81 mg EC tablet Take 81 mg by mouth daily      metFORMIN (GLUCOPHAGE) 500 mg tablet Take 500 mg by mouth 2 (two) times a day with meals      Multiple Vitamin (MULTIVITAMIN) tablet Take 1 tablet by mouth daily      naproxen (NAPROSYN) 500 mg tablet       valsartan-hydrochlorothiazide (DIOVAN-HCT) 160-12 5 MG per tablet Take 1 tablet by mouth daily       No current facility-administered medications for this visit  Objective:    Blood pressure 132/80, pulse 70, height 6' (1 829 m), weight (!) 194 kg (427 lb)  Body mass index is 57 91 kg/m²  Body surface area is 2 94 meters squared  Physical Exam   Constitutional: She is oriented to person, place, and time  She appears well-developed and well-nourished  HENT:   Head: Normocephalic and atraumatic  Neck: Normal range of motion  Neck supple  No thyromegaly present  Pulmonary/Chest: Effort normal    Abdominal: Soft  She exhibits no distension and no mass  There is no rebound  Genitourinary:   Genitourinary Comments: The external female genitalia is normal  The bartholin's, uretheral and skenes glands are normal  The urethral meatus is normal (midline with no lesions)  Anus without fissure or lesion  Speculum exam reveals a grossly normal vagina  No masses, lesions, discharge or bleeding  No significant cystocele or rectocele noted  Bimanual exam notes a surgical absent cervix, uterus and adnexal structures  No masses or fullness  Bladder is without fullness, mass or tenderness  Musculoskeletal: Normal range of motion  She exhibits no edema  Lymphadenopathy:     She has no cervical adenopathy  Neurological: She is alert and oriented to person, place, and time  Skin: Skin is warm and dry  No rash noted  Psychiatric: She has a normal mood and affect  Her behavior is normal    Vitals reviewed      Dewey James MD, [place], [facility]  10/8/2019  10:10 AM

## 2019-10-23 ENCOUNTER — APPOINTMENT (OUTPATIENT)
Dept: LAB | Facility: MEDICAL CENTER | Age: 59
End: 2019-10-23
Payer: COMMERCIAL

## 2019-10-23 ENCOUNTER — TRANSCRIBE ORDERS (OUTPATIENT)
Dept: LAB | Facility: MEDICAL CENTER | Age: 59
End: 2019-10-23

## 2019-10-23 DIAGNOSIS — Z79.4 OTHER SPECIFIED DIABETES MELLITUS WITH OTHER SPECIFIED COMPLICATION, WITH LONG-TERM CURRENT USE OF INSULIN (HCC): ICD-10-CM

## 2019-10-23 DIAGNOSIS — Z79.4 OTHER SPECIFIED DIABETES MELLITUS WITH OTHER SPECIFIED COMPLICATION, WITH LONG-TERM CURRENT USE OF INSULIN (HCC): Primary | ICD-10-CM

## 2019-10-23 DIAGNOSIS — E55.9 VITAMIN D DEFICIENCY: ICD-10-CM

## 2019-10-23 DIAGNOSIS — I10 HYPERTENSION, UNSPECIFIED TYPE: ICD-10-CM

## 2019-10-23 DIAGNOSIS — E13.69 OTHER SPECIFIED DIABETES MELLITUS WITH OTHER SPECIFIED COMPLICATION, WITH LONG-TERM CURRENT USE OF INSULIN (HCC): Primary | ICD-10-CM

## 2019-10-23 DIAGNOSIS — E13.69 OTHER SPECIFIED DIABETES MELLITUS WITH OTHER SPECIFIED COMPLICATION, WITH LONG-TERM CURRENT USE OF INSULIN (HCC): ICD-10-CM

## 2019-10-23 LAB
25(OH)D3 SERPL-MCNC: 27.6 NG/ML (ref 30–100)
ALBUMIN SERPL BCP-MCNC: 4.1 G/DL (ref 3.5–5)
ALP SERPL-CCNC: 124 U/L (ref 46–116)
ALT SERPL W P-5'-P-CCNC: 38 U/L (ref 12–78)
ANION GAP SERPL CALCULATED.3IONS-SCNC: 6 MMOL/L (ref 4–13)
AST SERPL W P-5'-P-CCNC: 15 U/L (ref 5–45)
BASOPHILS # BLD AUTO: 0.03 THOUSANDS/ΜL (ref 0–0.1)
BASOPHILS NFR BLD AUTO: 1 % (ref 0–1)
BILIRUB SERPL-MCNC: 0.67 MG/DL (ref 0.2–1)
BUN SERPL-MCNC: 25 MG/DL (ref 5–25)
CALCIUM SERPL-MCNC: 9.8 MG/DL (ref 8.3–10.1)
CHLORIDE SERPL-SCNC: 106 MMOL/L (ref 100–108)
CHOLEST SERPL-MCNC: 178 MG/DL (ref 50–200)
CO2 SERPL-SCNC: 29 MMOL/L (ref 21–32)
CREAT SERPL-MCNC: 0.84 MG/DL (ref 0.6–1.3)
CREAT UR-MCNC: 240 MG/DL
EOSINOPHIL # BLD AUTO: 0.09 THOUSAND/ΜL (ref 0–0.61)
EOSINOPHIL NFR BLD AUTO: 2 % (ref 0–6)
ERYTHROCYTE [DISTWIDTH] IN BLOOD BY AUTOMATED COUNT: 13.5 % (ref 11.6–15.1)
EST. AVERAGE GLUCOSE BLD GHB EST-MCNC: 123 MG/DL
GFR SERPL CREATININE-BSD FRML MDRD: 76 ML/MIN/1.73SQ M
GLUCOSE P FAST SERPL-MCNC: 116 MG/DL (ref 65–99)
HBA1C MFR BLD: 5.9 % (ref 4.2–6.3)
HCT VFR BLD AUTO: 44.5 % (ref 34.8–46.1)
HDLC SERPL-MCNC: 52 MG/DL
HGB BLD-MCNC: 14 G/DL (ref 11.5–15.4)
IMM GRANULOCYTES # BLD AUTO: 0.02 THOUSAND/UL (ref 0–0.2)
IMM GRANULOCYTES NFR BLD AUTO: 0 % (ref 0–2)
LDLC SERPL CALC-MCNC: 104 MG/DL (ref 0–100)
LYMPHOCYTES # BLD AUTO: 1.58 THOUSANDS/ΜL (ref 0.6–4.47)
LYMPHOCYTES NFR BLD AUTO: 31 % (ref 14–44)
MCH RBC QN AUTO: 29.6 PG (ref 26.8–34.3)
MCHC RBC AUTO-ENTMCNC: 31.5 G/DL (ref 31.4–37.4)
MCV RBC AUTO: 94 FL (ref 82–98)
MICROALBUMIN UR-MCNC: 10 MG/L (ref 0–20)
MICROALBUMIN/CREAT 24H UR: 4 MG/G CREATININE (ref 0–30)
MONOCYTES # BLD AUTO: 0.3 THOUSAND/ΜL (ref 0.17–1.22)
MONOCYTES NFR BLD AUTO: 6 % (ref 4–12)
NEUTROPHILS # BLD AUTO: 3.11 THOUSANDS/ΜL (ref 1.85–7.62)
NEUTS SEG NFR BLD AUTO: 60 % (ref 43–75)
NONHDLC SERPL-MCNC: 126 MG/DL
NRBC BLD AUTO-RTO: 0 /100 WBCS
PLATELET # BLD AUTO: 206 THOUSANDS/UL (ref 149–390)
PMV BLD AUTO: 10.2 FL (ref 8.9–12.7)
POTASSIUM SERPL-SCNC: 4.1 MMOL/L (ref 3.5–5.3)
PROT SERPL-MCNC: 7.9 G/DL (ref 6.4–8.2)
RBC # BLD AUTO: 4.73 MILLION/UL (ref 3.81–5.12)
SODIUM SERPL-SCNC: 141 MMOL/L (ref 136–145)
T4 FREE SERPL-MCNC: 1.08 NG/DL (ref 0.76–1.46)
TRIGL SERPL-MCNC: 108 MG/DL
TSH SERPL DL<=0.05 MIU/L-ACNC: 1.67 UIU/ML (ref 0.36–3.74)
WBC # BLD AUTO: 5.13 THOUSAND/UL (ref 4.31–10.16)

## 2019-10-23 PROCEDURE — 82306 VITAMIN D 25 HYDROXY: CPT

## 2019-10-23 PROCEDURE — 84439 ASSAY OF FREE THYROXINE: CPT

## 2019-10-23 PROCEDURE — 85025 COMPLETE CBC W/AUTO DIFF WBC: CPT

## 2019-10-23 PROCEDURE — 80053 COMPREHEN METABOLIC PANEL: CPT

## 2019-10-23 PROCEDURE — 80061 LIPID PANEL: CPT

## 2019-10-23 PROCEDURE — 36415 COLL VENOUS BLD VENIPUNCTURE: CPT

## 2019-10-23 PROCEDURE — 83036 HEMOGLOBIN GLYCOSYLATED A1C: CPT

## 2019-10-23 PROCEDURE — 84443 ASSAY THYROID STIM HORMONE: CPT

## 2019-10-23 PROCEDURE — 82043 UR ALBUMIN QUANTITATIVE: CPT

## 2019-10-23 PROCEDURE — 82570 ASSAY OF URINE CREATININE: CPT

## 2020-03-05 ENCOUNTER — TRANSCRIBE ORDERS (OUTPATIENT)
Dept: LAB | Facility: MEDICAL CENTER | Age: 60
End: 2020-03-05

## 2020-03-05 ENCOUNTER — APPOINTMENT (OUTPATIENT)
Dept: LAB | Facility: MEDICAL CENTER | Age: 60
End: 2020-03-05
Payer: COMMERCIAL

## 2020-03-05 DIAGNOSIS — J06.9 UPPER RESPIRATORY TRACT INFECTION, UNSPECIFIED TYPE: Primary | ICD-10-CM

## 2020-03-05 DIAGNOSIS — J06.9 UPPER RESPIRATORY TRACT INFECTION, UNSPECIFIED TYPE: ICD-10-CM

## 2020-03-05 DIAGNOSIS — I10 HYPERTENSION, UNSPECIFIED TYPE: ICD-10-CM

## 2020-03-05 DIAGNOSIS — Z79.4 OTHER SPECIFIED DIABETES MELLITUS WITH OTHER SPECIFIED COMPLICATION, WITH LONG-TERM CURRENT USE OF INSULIN (HCC): ICD-10-CM

## 2020-03-05 DIAGNOSIS — E13.69 OTHER SPECIFIED DIABETES MELLITUS WITH OTHER SPECIFIED COMPLICATION, WITH LONG-TERM CURRENT USE OF INSULIN (HCC): ICD-10-CM

## 2020-03-05 LAB
ANION GAP SERPL CALCULATED.3IONS-SCNC: 3 MMOL/L (ref 4–13)
BUN SERPL-MCNC: 22 MG/DL (ref 5–25)
CALCIUM SERPL-MCNC: 9.4 MG/DL (ref 8.3–10.1)
CHLORIDE SERPL-SCNC: 106 MMOL/L (ref 100–108)
CO2 SERPL-SCNC: 29 MMOL/L (ref 21–32)
CREAT SERPL-MCNC: 0.78 MG/DL (ref 0.6–1.3)
EST. AVERAGE GLUCOSE BLD GHB EST-MCNC: 103 MG/DL
GFR SERPL CREATININE-BSD FRML MDRD: 83 ML/MIN/1.73SQ M
GLUCOSE P FAST SERPL-MCNC: 102 MG/DL (ref 65–99)
HBA1C MFR BLD: 5.2 %
POTASSIUM SERPL-SCNC: 4 MMOL/L (ref 3.5–5.3)
SODIUM SERPL-SCNC: 138 MMOL/L (ref 136–145)

## 2020-03-05 PROCEDURE — 36415 COLL VENOUS BLD VENIPUNCTURE: CPT

## 2020-03-05 PROCEDURE — 83036 HEMOGLOBIN GLYCOSYLATED A1C: CPT

## 2020-03-05 PROCEDURE — 80048 BASIC METABOLIC PNL TOTAL CA: CPT

## 2020-09-15 ENCOUNTER — TRANSCRIBE ORDERS (OUTPATIENT)
Dept: LAB | Facility: MEDICAL CENTER | Age: 60
End: 2020-09-15

## 2020-09-15 ENCOUNTER — APPOINTMENT (OUTPATIENT)
Dept: LAB | Facility: MEDICAL CENTER | Age: 60
End: 2020-09-15
Payer: COMMERCIAL

## 2020-09-15 ENCOUNTER — TRANSCRIBE ORDERS (OUTPATIENT)
Dept: ADMINISTRATIVE | Facility: HOSPITAL | Age: 60
End: 2020-09-15

## 2020-09-15 DIAGNOSIS — Z00.129 WELL ADOLESCENT VISIT: Primary | ICD-10-CM

## 2020-09-15 DIAGNOSIS — E13.69 OTHER SPECIFIED DIABETES MELLITUS WITH OTHER SPECIFIED COMPLICATION, WITH LONG-TERM CURRENT USE OF INSULIN (HCC): ICD-10-CM

## 2020-09-15 DIAGNOSIS — E55.9 VITAMIN D DEFICIENCY: ICD-10-CM

## 2020-09-15 DIAGNOSIS — Z12.31 ENCOUNTER FOR SCREENING MAMMOGRAM FOR MALIGNANT NEOPLASM OF BREAST: Primary | ICD-10-CM

## 2020-09-15 DIAGNOSIS — Z00.129 WELL ADOLESCENT VISIT: ICD-10-CM

## 2020-09-15 DIAGNOSIS — Z79.4 OTHER SPECIFIED DIABETES MELLITUS WITH OTHER SPECIFIED COMPLICATION, WITH LONG-TERM CURRENT USE OF INSULIN (HCC): ICD-10-CM

## 2020-09-15 DIAGNOSIS — R53.83 FATIGUE, UNSPECIFIED TYPE: ICD-10-CM

## 2020-09-15 LAB
25(OH)D3 SERPL-MCNC: 27.4 NG/ML (ref 30–100)
ANION GAP SERPL CALCULATED.3IONS-SCNC: 7 MMOL/L (ref 4–13)
BUN SERPL-MCNC: 28 MG/DL (ref 5–25)
CALCIUM SERPL-MCNC: 10 MG/DL (ref 8.3–10.1)
CHLORIDE SERPL-SCNC: 103 MMOL/L (ref 100–108)
CHOLEST SERPL-MCNC: 203 MG/DL (ref 50–200)
CO2 SERPL-SCNC: 28 MMOL/L (ref 21–32)
CREAT SERPL-MCNC: 0.76 MG/DL (ref 0.6–1.3)
GFR SERPL CREATININE-BSD FRML MDRD: 86 ML/MIN/1.73SQ M
GLUCOSE P FAST SERPL-MCNC: 112 MG/DL (ref 65–99)
HDLC SERPL-MCNC: 51 MG/DL
LDLC SERPL CALC-MCNC: 127 MG/DL (ref 0–100)
NONHDLC SERPL-MCNC: 152 MG/DL
POTASSIUM SERPL-SCNC: 4 MMOL/L (ref 3.5–5.3)
SODIUM SERPL-SCNC: 138 MMOL/L (ref 136–145)
T4 FREE SERPL-MCNC: 1.23 NG/DL (ref 0.76–1.46)
TRIGL SERPL-MCNC: 127 MG/DL
TSH SERPL DL<=0.05 MIU/L-ACNC: 1.76 UIU/ML (ref 0.36–3.74)

## 2020-09-15 PROCEDURE — 82306 VITAMIN D 25 HYDROXY: CPT

## 2020-09-15 PROCEDURE — 84439 ASSAY OF FREE THYROXINE: CPT

## 2020-09-15 PROCEDURE — 80061 LIPID PANEL: CPT

## 2020-09-15 PROCEDURE — 83036 HEMOGLOBIN GLYCOSYLATED A1C: CPT

## 2020-09-15 PROCEDURE — 36415 COLL VENOUS BLD VENIPUNCTURE: CPT

## 2020-09-15 PROCEDURE — 80048 BASIC METABOLIC PNL TOTAL CA: CPT

## 2020-09-15 PROCEDURE — 84443 ASSAY THYROID STIM HORMONE: CPT

## 2020-09-16 LAB
EST. AVERAGE GLUCOSE BLD GHB EST-MCNC: 105 MG/DL
HBA1C MFR BLD: 5.3 %

## 2020-09-26 ENCOUNTER — HOSPITAL ENCOUNTER (OUTPATIENT)
Dept: MAMMOGRAPHY | Facility: HOSPITAL | Age: 60
Discharge: HOME | End: 2020-09-26
Payer: COMMERCIAL

## 2020-09-26 VITALS — BODY MASS INDEX: 39.68 KG/M2 | HEIGHT: 72 IN | WEIGHT: 293 LBS

## 2020-09-26 DIAGNOSIS — Z12.31 ENCOUNTER FOR SCREENING MAMMOGRAM FOR MALIGNANT NEOPLASM OF BREAST: ICD-10-CM

## 2020-09-26 PROCEDURE — 77067 SCR MAMMO BI INCL CAD: CPT

## 2020-09-26 PROCEDURE — 77063 BREAST TOMOSYNTHESIS BI: CPT

## 2021-01-07 DIAGNOSIS — R05.9 COUGH: ICD-10-CM

## 2021-01-07 DIAGNOSIS — Z20.828 EXPOSURE TO SARS-ASSOCIATED CORONAVIRUS: ICD-10-CM

## 2021-01-07 DIAGNOSIS — R09.81 NASAL CONGESTION: ICD-10-CM

## 2021-01-07 PROCEDURE — U0005 INFEC AGEN DETEC AMPLI PROBE: HCPCS | Performed by: NURSE PRACTITIONER

## 2021-01-07 PROCEDURE — U0003 INFECTIOUS AGENT DETECTION BY NUCLEIC ACID (DNA OR RNA); SEVERE ACUTE RESPIRATORY SYNDROME CORONAVIRUS 2 (SARS-COV-2) (CORONAVIRUS DISEASE [COVID-19]), AMPLIFIED PROBE TECHNIQUE, MAKING USE OF HIGH THROUGHPUT TECHNOLOGIES AS DESCRIBED BY CMS-2020-01-R: HCPCS | Performed by: NURSE PRACTITIONER

## 2021-01-08 LAB — SARS-COV-2 RNA SPEC QL NAA+PROBE: DETECTED

## 2021-07-24 ENCOUNTER — APPOINTMENT (OUTPATIENT)
Dept: LAB | Facility: HOSPITAL | Age: 61
End: 2021-07-24
Payer: COMMERCIAL

## 2021-07-24 DIAGNOSIS — E11.9 TYPE 2 DIABETES MELLITUS WITHOUT COMPLICATION, UNSPECIFIED WHETHER LONG TERM INSULIN USE (HCC): ICD-10-CM

## 2021-07-24 LAB
ANION GAP SERPL CALCULATED.3IONS-SCNC: 10 MMOL/L (ref 4–13)
BUN SERPL-MCNC: 26 MG/DL (ref 7–25)
CALCIUM SERPL-MCNC: 9.7 MG/DL (ref 8.6–10.5)
CHLORIDE SERPL-SCNC: 103 MMOL/L (ref 98–107)
CO2 SERPL-SCNC: 27 MMOL/L (ref 21–31)
CREAT SERPL-MCNC: 1.02 MG/DL (ref 0.6–1.2)
EST. AVERAGE GLUCOSE BLD GHB EST-MCNC: 103 MG/DL
GFR SERPL CREATININE-BSD FRML MDRD: 60 ML/MIN/1.73SQ M
GLUCOSE P FAST SERPL-MCNC: 129 MG/DL (ref 65–99)
HBA1C MFR BLD: 5.2 %
POTASSIUM SERPL-SCNC: 4.1 MMOL/L (ref 3.5–5.5)
SODIUM SERPL-SCNC: 140 MMOL/L (ref 134–143)

## 2021-07-24 PROCEDURE — 83036 HEMOGLOBIN GLYCOSYLATED A1C: CPT

## 2021-07-24 PROCEDURE — 80048 BASIC METABOLIC PNL TOTAL CA: CPT

## 2021-07-24 PROCEDURE — 36415 COLL VENOUS BLD VENIPUNCTURE: CPT

## 2021-08-03 ENCOUNTER — APPOINTMENT (OUTPATIENT)
Dept: RADIOLOGY | Facility: CLINIC | Age: 61
End: 2021-08-03
Payer: COMMERCIAL

## 2021-08-03 PROCEDURE — 71046 X-RAY EXAM CHEST 2 VIEWS: CPT

## 2022-03-22 ENCOUNTER — APPOINTMENT (OUTPATIENT)
Dept: LAB | Facility: MEDICAL CENTER | Age: 62
End: 2022-03-22
Payer: COMMERCIAL

## 2022-03-22 DIAGNOSIS — R53.83 OTHER FATIGUE: ICD-10-CM

## 2022-03-22 DIAGNOSIS — R73.9 ELEVATED BLOOD SUGAR: ICD-10-CM

## 2022-03-22 DIAGNOSIS — E78.5 HYPERLIPIDEMIA, UNSPECIFIED HYPERLIPIDEMIA TYPE: ICD-10-CM

## 2022-03-22 DIAGNOSIS — E55.9 VITAMIN D DEFICIENCY: ICD-10-CM

## 2022-03-22 DIAGNOSIS — R63.5 WEIGHT GAIN: ICD-10-CM

## 2022-03-22 DIAGNOSIS — I10 HYPERTENSION, UNSPECIFIED TYPE: ICD-10-CM

## 2022-03-22 LAB
25(OH)D3 SERPL-MCNC: 33.8 NG/ML (ref 30–100)
ALBUMIN SERPL BCP-MCNC: 3.7 G/DL (ref 3.5–5)
ALP SERPL-CCNC: 140 U/L (ref 46–116)
ALT SERPL W P-5'-P-CCNC: 23 U/L (ref 12–78)
ANION GAP SERPL CALCULATED.3IONS-SCNC: 4 MMOL/L (ref 4–13)
AST SERPL W P-5'-P-CCNC: 16 U/L (ref 5–45)
BASOPHILS # BLD AUTO: 0.05 THOUSANDS/ΜL (ref 0–0.1)
BASOPHILS NFR BLD AUTO: 1 % (ref 0–1)
BILIRUB SERPL-MCNC: 0.77 MG/DL (ref 0.2–1)
BUN SERPL-MCNC: 29 MG/DL (ref 5–25)
CALCIUM SERPL-MCNC: 10 MG/DL (ref 8.3–10.1)
CHLORIDE SERPL-SCNC: 106 MMOL/L (ref 100–108)
CHOLEST SERPL-MCNC: 198 MG/DL
CO2 SERPL-SCNC: 29 MMOL/L (ref 21–32)
CREAT SERPL-MCNC: 1.03 MG/DL (ref 0.6–1.3)
EOSINOPHIL # BLD AUTO: 0.13 THOUSAND/ΜL (ref 0–0.61)
EOSINOPHIL NFR BLD AUTO: 3 % (ref 0–6)
ERYTHROCYTE [DISTWIDTH] IN BLOOD BY AUTOMATED COUNT: 13.2 % (ref 11.6–15.1)
ERYTHROCYTE [SEDIMENTATION RATE] IN BLOOD: 38 MM/HOUR (ref 0–29)
EST. AVERAGE GLUCOSE BLD GHB EST-MCNC: 111 MG/DL
GFR SERPL CREATININE-BSD FRML MDRD: 58 ML/MIN/1.73SQ M
GLUCOSE P FAST SERPL-MCNC: 129 MG/DL (ref 65–99)
HBA1C MFR BLD: 5.5 %
HCT VFR BLD AUTO: 43.7 % (ref 34.8–46.1)
HDLC SERPL-MCNC: 55 MG/DL
HGB BLD-MCNC: 14.5 G/DL (ref 11.5–15.4)
IMM GRANULOCYTES # BLD AUTO: 0.01 THOUSAND/UL (ref 0–0.2)
IMM GRANULOCYTES NFR BLD AUTO: 0 % (ref 0–2)
LDLC SERPL CALC-MCNC: 116 MG/DL (ref 0–100)
LYMPHOCYTES # BLD AUTO: 1.62 THOUSANDS/ΜL (ref 0.6–4.47)
LYMPHOCYTES NFR BLD AUTO: 35 % (ref 14–44)
MCH RBC QN AUTO: 29.5 PG (ref 26.8–34.3)
MCHC RBC AUTO-ENTMCNC: 33.2 G/DL (ref 31.4–37.4)
MCV RBC AUTO: 89 FL (ref 82–98)
MONOCYTES # BLD AUTO: 0.27 THOUSAND/ΜL (ref 0.17–1.22)
MONOCYTES NFR BLD AUTO: 6 % (ref 4–12)
NEUTROPHILS # BLD AUTO: 2.61 THOUSANDS/ΜL (ref 1.85–7.62)
NEUTS SEG NFR BLD AUTO: 55 % (ref 43–75)
NONHDLC SERPL-MCNC: 143 MG/DL
NRBC BLD AUTO-RTO: 0 /100 WBCS
PLATELET # BLD AUTO: 227 THOUSANDS/UL (ref 149–390)
PMV BLD AUTO: 9.8 FL (ref 8.9–12.7)
POTASSIUM SERPL-SCNC: 4.6 MMOL/L (ref 3.5–5.3)
PROT SERPL-MCNC: 8 G/DL (ref 6.4–8.2)
RBC # BLD AUTO: 4.91 MILLION/UL (ref 3.81–5.12)
SODIUM SERPL-SCNC: 139 MMOL/L (ref 136–145)
T4 FREE SERPL-MCNC: 1.26 NG/DL (ref 0.76–1.46)
TRIGL SERPL-MCNC: 133 MG/DL
TSH SERPL DL<=0.05 MIU/L-ACNC: 1.6 UIU/ML (ref 0.36–3.74)
WBC # BLD AUTO: 4.69 THOUSAND/UL (ref 4.31–10.16)

## 2022-03-22 PROCEDURE — 83036 HEMOGLOBIN GLYCOSYLATED A1C: CPT

## 2022-03-22 PROCEDURE — 85652 RBC SED RATE AUTOMATED: CPT

## 2022-03-22 PROCEDURE — 82306 VITAMIN D 25 HYDROXY: CPT

## 2022-03-22 PROCEDURE — 85025 COMPLETE CBC W/AUTO DIFF WBC: CPT

## 2022-03-22 PROCEDURE — 84439 ASSAY OF FREE THYROXINE: CPT

## 2022-03-22 PROCEDURE — 84443 ASSAY THYROID STIM HORMONE: CPT

## 2022-03-22 PROCEDURE — 36415 COLL VENOUS BLD VENIPUNCTURE: CPT

## 2022-03-22 PROCEDURE — 80061 LIPID PANEL: CPT

## 2022-03-22 PROCEDURE — 80053 COMPREHEN METABOLIC PANEL: CPT

## 2022-04-07 ENCOUNTER — APPOINTMENT (OUTPATIENT)
Dept: LAB | Facility: MEDICAL CENTER | Age: 62
End: 2022-04-07
Payer: COMMERCIAL

## 2022-04-07 DIAGNOSIS — R73.9 ELEVATED BLOOD SUGAR: ICD-10-CM

## 2022-04-07 DIAGNOSIS — E55.9 VITAMIN D DEFICIENCY: ICD-10-CM

## 2022-04-07 DIAGNOSIS — I10 HYPERTENSION, UNSPECIFIED TYPE: ICD-10-CM

## 2022-04-07 DIAGNOSIS — E78.5 HYPERLIPIDEMIA, UNSPECIFIED HYPERLIPIDEMIA TYPE: ICD-10-CM

## 2022-04-07 DIAGNOSIS — R53.83 OTHER FATIGUE: ICD-10-CM

## 2022-04-07 DIAGNOSIS — R63.5 WEIGHT GAIN: ICD-10-CM

## 2022-04-07 LAB — NT-PROBNP SERPL-MCNC: 70 PG/ML

## 2022-04-07 PROCEDURE — 36415 COLL VENOUS BLD VENIPUNCTURE: CPT

## 2022-04-07 PROCEDURE — 86430 RHEUMATOID FACTOR TEST QUAL: CPT

## 2022-04-07 PROCEDURE — 83880 ASSAY OF NATRIURETIC PEPTIDE: CPT

## 2022-04-07 PROCEDURE — 86038 ANTINUCLEAR ANTIBODIES: CPT

## 2022-04-07 PROCEDURE — 86200 CCP ANTIBODY: CPT

## 2022-04-08 LAB
CCP AB SER IA-ACNC: 12
RHEUMATOID FACT SER QL LA: NEGATIVE
RYE IGE QN: NEGATIVE

## 2022-04-12 ENCOUNTER — APPOINTMENT (OUTPATIENT)
Dept: RADIOLOGY | Facility: CLINIC | Age: 62
End: 2022-04-12
Payer: COMMERCIAL

## 2022-04-12 DIAGNOSIS — M54.2 NECK PAIN: ICD-10-CM

## 2022-04-12 DIAGNOSIS — M79.641 BILATERAL HAND PAIN: ICD-10-CM

## 2022-04-12 DIAGNOSIS — M79.642 BILATERAL HAND PAIN: ICD-10-CM

## 2022-04-12 DIAGNOSIS — M25.532 LEFT WRIST PAIN: ICD-10-CM

## 2022-04-12 DIAGNOSIS — M25.531 BILATERAL WRIST PAIN: ICD-10-CM

## 2022-04-12 DIAGNOSIS — M25.532 BILATERAL WRIST PAIN: ICD-10-CM

## 2022-04-12 DIAGNOSIS — M25.531 RIGHT WRIST PAIN: ICD-10-CM

## 2022-04-12 PROCEDURE — 73130 X-RAY EXAM OF HAND: CPT

## 2022-04-12 PROCEDURE — 73110 X-RAY EXAM OF WRIST: CPT

## 2022-04-12 PROCEDURE — 72050 X-RAY EXAM NECK SPINE 4/5VWS: CPT

## 2022-06-28 ENCOUNTER — APPOINTMENT (OUTPATIENT)
Dept: LAB | Facility: MEDICAL CENTER | Age: 62
End: 2022-06-28
Payer: COMMERCIAL

## 2022-06-28 DIAGNOSIS — E13.69 OTHER SPECIFIED DIABETES MELLITUS WITH OTHER SPECIFIED COMPLICATION, WITH LONG-TERM CURRENT USE OF INSULIN (HCC): ICD-10-CM

## 2022-06-28 DIAGNOSIS — Z79.4 OTHER SPECIFIED DIABETES MELLITUS WITH OTHER SPECIFIED COMPLICATION, WITH LONG-TERM CURRENT USE OF INSULIN (HCC): ICD-10-CM

## 2022-06-28 DIAGNOSIS — I10 HYPERTENSION, UNSPECIFIED TYPE: ICD-10-CM

## 2022-06-28 DIAGNOSIS — M54.9 BACK PAIN, UNSPECIFIED BACK LOCATION, UNSPECIFIED BACK PAIN LATERALITY, UNSPECIFIED CHRONICITY: ICD-10-CM

## 2022-06-28 LAB
ANION GAP SERPL CALCULATED.3IONS-SCNC: 4 MMOL/L (ref 4–13)
BUN SERPL-MCNC: 29 MG/DL (ref 5–25)
CALCIUM SERPL-MCNC: 9.9 MG/DL (ref 8.3–10.1)
CHLORIDE SERPL-SCNC: 102 MMOL/L (ref 100–108)
CO2 SERPL-SCNC: 32 MMOL/L (ref 21–32)
CREAT SERPL-MCNC: 1.03 MG/DL (ref 0.6–1.3)
EST. AVERAGE GLUCOSE BLD GHB EST-MCNC: 120 MG/DL
GFR SERPL CREATININE-BSD FRML MDRD: 58 ML/MIN/1.73SQ M
GLUCOSE SERPL-MCNC: 104 MG/DL (ref 65–140)
HBA1C MFR BLD: 5.8 %
POTASSIUM SERPL-SCNC: 4.9 MMOL/L (ref 3.5–5.3)
SODIUM SERPL-SCNC: 138 MMOL/L (ref 136–145)

## 2022-06-28 PROCEDURE — 80048 BASIC METABOLIC PNL TOTAL CA: CPT

## 2022-06-28 PROCEDURE — 36415 COLL VENOUS BLD VENIPUNCTURE: CPT

## 2022-06-28 PROCEDURE — 83036 HEMOGLOBIN GLYCOSYLATED A1C: CPT

## 2022-07-25 ENCOUNTER — CONSULT (OUTPATIENT)
Dept: PAIN MEDICINE | Facility: CLINIC | Age: 62
End: 2022-07-25
Payer: COMMERCIAL

## 2022-07-25 VITALS — WEIGHT: 293 LBS | BODY MASS INDEX: 39.68 KG/M2 | HEIGHT: 72 IN

## 2022-07-25 DIAGNOSIS — M47.816 LUMBAR SPONDYLOSIS: ICD-10-CM

## 2022-07-25 DIAGNOSIS — M54.16 LUMBAR RADICULOPATHY: Primary | ICD-10-CM

## 2022-07-25 DIAGNOSIS — M48.061 SPINAL STENOSIS OF LUMBAR REGION WITHOUT NEUROGENIC CLAUDICATION: ICD-10-CM

## 2022-07-25 PROCEDURE — 99204 OFFICE O/P NEW MOD 45 MIN: CPT | Performed by: ANESTHESIOLOGY

## 2022-07-25 RX ORDER — DULOXETIN HYDROCHLORIDE 30 MG/1
30 CAPSULE, DELAYED RELEASE ORAL DAILY
Qty: 30 CAPSULE | Refills: 1 | Status: SHIPPED | OUTPATIENT
Start: 2022-07-25 | End: 2022-10-14

## 2022-07-25 NOTE — PROGRESS NOTES
Assessment:  1  Lumbar radiculopathy    2  Spinal stenosis of lumbar region without neurogenic claudication    3  Lumbar spondylosis        Plan:  Patient is a 75-year-old female complaints of low back pain, bilateral leg pain left worse than right, left leg weakness and numbness presents office for initial consultation  Patient requires a cane and at severe times a walker for gait assistance secondary to loss of function of the left leg after sustaining in the upright position for an extended period of time  Patient does have multiple levels of degenerative disc changes with multiple levels of moderate spinal canal stenosis, foraminal stenosis right side worse than left, lumbar spondylosis  MRI of lumbar spine was reviewed at this office visit  Patient was asked to bring the disc for visualization of the lumbar spine at next office visit  At this time due to the fact that there is significant ligamentum flavum infolding hypertrophy that is a signal of muscle weakness therefore we need to focus on core strengthening  Patient did have oral steroids which did help alleviate her symptoms in addition to tramadol  1  We will provide patient with aqua therapy  2  We will provide patient with Cymbalta 30 mg p o  Q h s  for lumbar radicular symptoms  3  We will follow up in 6 weeks      History of Present Illness: The patient is a 58 y o  female who presents for consultation in regards to Back Pain  Symptoms have been present for 30 years  Symptoms began following a non work related injury  Pain is reported to be 5 on the numeric rating scale  Symptoms are felt nearly constantly and worst in the no typical pattern  Symptoms are characterized as cramping and pressure-like  Symptoms are associated with left leg weakness  Aggravating factors include standing, bending, walking, exercise and coughing/sneezing  Relieving factors include lying down and relaxation    No change in symptoms with kneeling, sitting and bowel movements  Treatments that have been helpful include surgery , physical therapy, chiropractic manipulation, home exercise and heat/ice  Medications to relieve symptoms include Tylenol  Review of Systems:    Review of Systems   Constitutional: Negative for activity change, diaphoresis and unexpected weight change  HENT: Negative for congestion, ear discharge, mouth sores, sneezing and voice change  Eyes: Positive for visual disturbance (floaters)  Negative for photophobia and discharge  Respiratory: Negative for apnea and stridor  Cardiovascular: Positive for leg swelling  Negative for chest pain  Gastrointestinal: Negative for abdominal distention, blood in stool and rectal pain  Endocrine: Negative for cold intolerance, heat intolerance and polyphagia  Genitourinary: Negative for decreased urine volume, enuresis, flank pain, genital sores and hematuria  Musculoskeletal: Negative for arthralgias  Joint pain  Muscle pain   Skin: Negative for color change, pallor and rash  Allergic/Immunologic: Negative for environmental allergies, food allergies and immunocompromised state  Neurological: Positive for numbness  Negative for seizures, speech difficulty and light-headedness  Hematological: Negative for adenopathy  Psychiatric/Behavioral: Negative for agitation, confusion, hallucinations, self-injury and suicidal ideas  The patient is not hyperactive  All other systems reviewed and are negative          Past Medical History:   Diagnosis Date    Cancer [facility])     uterine    Chronic midline low back pain     Hypertension     Morbid obesity ([facility] [address] )     Parity 2     Sleep apnea        Past Surgical History:   Procedure Laterality Date    BACK SURGERY      CHOLECYSTECTOMY      COLONOSCOPY      DILATION AND CURETTAGE OF UTERUS      HYSTERECTOMY      JOINT REPLACEMENT Bilateral     KNEES    LAMINECTOMY      LAPAROSCOPY      MOUTH SURGERY      MULTIPLE TOOTH EXTRACTIONS  OOPHORECTOMY      ND COLONOSCOPY FLX DX W/COLLJ SPEC WHEN PFRMD N/A 9/11/2017    Procedure: COLONOSCOPY;  Surgeon: Jelani Fleming MD;  Location: [facility] MA[facility];  Service: Colorectal    ND LAPAROSCOPY W TOT HYSTERECTUTERUS <=250 GRAM  W TUBE/OVARY N/A 10/5/2017    Procedure: ROBOTIC LAPAROSCOPIC HYSTERECTOMY; BILATERAL SALPINGO-OOPHERECTOMY, CYSTO;  Surgeon: Jomar Gilliam MD;  Location:  MAIN OR;  Service: Gynecology Oncology    REPLACEMENT TOTAL KNEE      STOMACH SURGERY      gastric stapling    TUBAL LIGATION         Family History   Problem Relation Age of Onset    Endometrial cancer Mother     No Known Problems Daughter     No Known Problems Maternal Grandmother     No Known Problems Maternal Grandfather     No Known Problems Paternal Grandmother     No Known Problems Paternal Grandfather     No Known Problems Daughter     No Known Problems Maternal Aunt     No Known Problems Paternal Aunt     Breast cancer Cousin        Social History     Occupational History    Not on file   Tobacco Use    Smoking status: Never Smoker    Smokeless tobacco: Never Used   Substance and Sexual Activity    Alcohol use: No    Drug use: No    Sexual activity: Never         Current Outpatient Medications:     aspirin (ECOTRIN LOW STRENGTH) 81 mg EC tablet, Take 81 mg by mouth daily, Disp: , Rfl:     metFORMIN (GLUCOPHAGE) 500 mg tablet, Take 500 mg by mouth 2 (two) times a day with meals, Disp: , Rfl:     Multiple Vitamin (MULTIVITAMIN) tablet, Take 1 tablet by mouth daily, Disp: , Rfl:     naproxen (NAPROSYN) 500 mg tablet, , Disp: , Rfl:     valsartan-hydrochlorothiazide (DIOVAN-HCT) 160-12 5 MG per tablet, Take 1 tablet by mouth daily, Disp: , Rfl:     No Known Allergies    Physical Exam:    Ht 6' (1 829 m)   Wt (!) 191 kg (421 lb)   BMI 57 10 kg/m²     Constitutional: normal, well developed, well nourished, alert, in no distress and non-toxic and no overt pain behavior   and obese  Eyes: anicteric  HEENT: grossly intact  Neck: supple, symmetric, trachea midline and no masses   Pulmonary:even and unlabored  Cardiovascular:No edema or pitting edema present  Skin:Normal without rashes or lesions and well hydrated  Psychiatric:Mood and affect appropriate  Neurologic:Cranial Nerves II-XII grossly intact  Musculoskeletal:normal and antalgic     Lumbar/Sacral Spine examination demonstrates  Decreased range of motion lumbar spine with pain upon: flexion, lateral rotation to the left/right, and bending to the left/right  Bilateral lumbar paraspinals tender to palpation  Muscle spasms noted in the lumbar area bilaterally  3/5 lower extremity strength in all muscle groups bilaterally  Positive seated straight leg raise for bilateral lower extremities  Sensitivity to light touch intact bilateral lower extremities  2+ reflexes in the patella and Achilles  No ankle clonus       Imaging  No orders to display       No orders of the defined types were placed in this encounter

## 2022-07-25 NOTE — PATIENT INSTRUCTIONS
Core Strengthening Exercises   WHAT YOU NEED TO KNOW:   What do I need to know about core strengthening exercises? Your core includes the muscles of your lower back, hip, pelvis, and abdomen  Core strengthening exercises help heal and strengthen these muscles  This helps prevent another injury, and keeps your pelvis, spine, and hips in the correct position  What do I need to know about exercise safety? Talk to your healthcare provider before you start an exercise program  A physical therapist can teach you how to do core strengthening exercises safely  Do the exercises on a mat or firm surface  A firm surface will support your spine and prevent low back pain  Do not do these exercises on a bed  Move slowly and smoothly  Avoid fast or jerky motions  Stop if you feel pain  Core exercises should not be painful  Stop if you feel pain  Breathe normally during core exercises  Do not hold your breath  This may cause an increase in blood pressure and prevent muscle strengthening  Your healthcare provider will tell you when to inhale and exhale during the exercise  Begin all of your exercises with abdominal bracing  Abdominal bracing helps warm up your core muscles  You can also practice abdominal bracing throughout the day  Lie on your back with your knees bent and feet flat on the floor  Place your arms in a relaxed position beside your body  Tighten your abdominal muscles  Pull your belly button in and up toward your spine  Hold for 5 seconds  Relax your muscles  Repeat 10 times  How do I perform core strengthening exercises? Your healthcare provider will tell you how often to do these exercises  The provider will also tell you how many repetitions of each exercise you should do  Hold each exercise for 5 seconds or as directed  As you get stronger, increase your hold to 10 to 15 seconds  You can do some of these exercises on a stability ball, or with a weight   Ask your healthcare provider how to use a stability ball or weight for these exercises:  Bridging:  Lie on your back with your knees bent and feet flat on the floor  Rest your arms at your side  Tighten your buttocks, and then lift your hips 1 inch off the floor  Hold for 5 seconds  When you can do this exercise without pain for 10 seconds, increase the distance you lift your hips  A good goal is to be able to lift your hips so that your shoulders, hips, and knees are in a straight line  Dead bug:  Lie on your back with your knees bent and feet flat on the floor  Place your arms in a relaxed position beside your body  Begin with abdominal bracing  Next, raise one leg, keeping your knee bent  Hold for 5 seconds  Repeat with the other leg  When you can do this exercise without pain for 10 to 15 seconds, you may raise one straight leg and hold  Repeat with the other leg  Quadruped:  Place your hands and knees on the floor  Keep your wrists directly below your shoulders and your knees directly below your hips  Pull your belly button in toward your spine  Do not flatten or arch your back  Tighten your abdominal muscles below your belly button  Hold for 5 seconds  When you can do this exercise without pain for 10 to 15 seconds, you may extend one arm and hold  Repeat on the other side  Side bridge exercises:      Standing side bridge:  Stand next to a wall and extend one arm toward the wall  Place your palm flat on the wall with your fingers pointing upward  Begin with abdominal bracing  Next, without moving your feet, slowly bend your arm to 90 degrees  Hold for 5 seconds  Repeat on the other side  When you can do this exercise without pain for 10 to 15 seconds, you may do the bent leg side bridge on the floor  Bent leg side bridge:  Lie on one side with your legs, hips, and shoulders in a straight line  Prop yourself up onto your forearm so your elbow is directly below your shoulder   Bend your knees back to 90 degrees  Begin with abdominal bracing  Next, lift your hips and balance yourself on your forearm and knees  Hold for 5 seconds  Repeat on the other side  When you can do this exercise without pain for 10 to 15 seconds, you may do the straight leg side bridge on the floor  Straight leg side bridge:  Lie on one side with your legs, hips, and shoulders in a straight line  Prop yourself up onto your forearm so your elbow is directly below your shoulder  Begin with abdominal bracing  Lift your hips off the floor and balance yourself on your forearm and the outside of your flexed foot  Do not let your ankle bend sideways  Hold for 5 seconds  Repeat on the other side  When you can do this exercise without pain for 10 to 15 seconds, ask your healthcare provider for more advanced exercises  Superman:  Lie on your stomach  Extend your arms forward on the floor  Tighten your abdominal muscles and lift your right hand and left leg off the floor  Hold this position  Slowly return to the starting position  Tighten your abdominal muscles and lift your left hand and right leg off the floor  Hold this position  Slowly return to the starting position  Clam:  Lie on your side with your knees bent  Put your bottom arm under your head to keep your neck in line  Put your top hand on your hip to keep your pelvis from moving  Put your heels together, and keep them together during this exercise  Slowly raise your top knee toward the ceiling  Then lower your leg so your knees are together  Repeat this exercise 10 times  Then switch sides and do the exercise 10 times with the other leg  Curl up:  Lie on your back with your knees bent and feet flat on the floor  Place your hands, palms down, underneath your lower back  Next, with your elbows on the floor, lift your shoulders and chest 2 to 3 inches off the floor  Keep your head in line with your shoulders  Hold this position   Slowly return to the starting position  Straight leg raises:  Lie on your back with one leg straight  Bend the other knee and place your foot flat on the floor  Tighten your abdominal muscles  Keep your leg straight and slowly lift it straight up 6 to 12 inches off the floor  Hold this position  Lower your leg slowly  Do as many repetitions as directed on this side  Repeat with the other leg  Plank:  Lie on your stomach  Bend your elbows and place your forearms flat on the floor  Lift your chest, stomach, and knees off the floor  Make sure your elbows are below your shoulders  Your body should be in a straight line  Do not let your hips or lower back sink to the ground  Squeeze your abdominal muscles together and hold for 15 seconds  To make this exercise harder, hold for 30 seconds or lift 1 leg at a time  Bicycles:  Lie on your back  Bend both knees and bring them toward your chest  Your calves should be parallel to the floor  Place the palms of your hands on the back of your head  Straighten your right leg and keep it lifted 2 inches off the floor  Raise your head and shoulders off the floor and twist towards your left  Keep your head and shoulders lifted  Bend your right knee while you straighten your left leg  Keep your left leg 2 inches off the floor  Twist your head and chest towards the left leg  Continue to straighten 1 leg at a time and twist        When should I contact my healthcare provider? You have sharp or worsening pain during exercise or at rest     You have questions or concerns about your condition, care, or exercise program     CARE AGREEMENT:   You have the right to help plan your care  Learn about your health condition and how it may be treated  Discuss treatment options with your healthcare providers to decide what care you want to receive  You always have the right to refuse treatment  The above information is an  only   It is not intended as medical advice for individual conditions or treatments  Talk to your doctor, nurse or pharmacist before following any medical regimen to see if it is safe and effective for you  © Copyright [employer] [facility] 2022 Information is for End User's use only and may not be sold, redistributed or otherwise used for commercial purposes   All illustrations and images included in CareNotes® are the copyrighted property of A D A M [employer]  or [address] [place] [address]

## 2022-08-08 ENCOUNTER — EVALUATION (OUTPATIENT)
Dept: PHYSICAL THERAPY | Age: 62
End: 2022-08-08
Payer: COMMERCIAL

## 2022-08-08 DIAGNOSIS — M54.16 LUMBAR RADICULOPATHY: Primary | ICD-10-CM

## 2022-08-08 PROCEDURE — 97113 AQUATIC THERAPY/EXERCISES: CPT | Performed by: PHYSICAL THERAPIST

## 2022-08-08 PROCEDURE — 97162 PT EVAL MOD COMPLEX 30 MIN: CPT | Performed by: PHYSICAL THERAPIST

## 2022-08-08 NOTE — PROGRESS NOTES
PT Evaluation     Today's date: 2022  Patient name: Yanni Mccann  : 1960  MRN: [id number]  Referring provider: Sdy Pino MD  Dx:   Encounter Diagnosis     ICD-10-CM    1  Lumbar radiculopathy  M54 16 PT plan of care cert/re-cert       Start Time: 1700  Stop Time: 1800  Total time in clinic (min): 60 minutes    Assessment  Assessment details: Yanni Mccann is a 58 y o  female who presents with pain, decreased strength, decreased ROM, ambulatory dysfunction, postural dysfunction and balance dysfunction  Due to these impairments, Patient has difficulty performing a/iadls  Patient's clinical presentation is consistent with their referring diagnosis of lumbar radiculopathy  Patient would benefit from skilled physical therapy to address their aforementioned impairments, improve their level of function and to improve their overall quality of life  Impairments: abnormal gait, abnormal muscle tone, abnormal or restricted ROM, abnormal movement, activity intolerance, impaired balance, impaired physical strength, lacks appropriate home exercise program, pain with function, safety issue, weight-bearing intolerance, poor posture  and poor body mechanics  Understanding of Dx/Px/POC: good   Prognosis: good    Goals  ST-3 WEEKS  1  Decrease pain by 2 points on VAS at its worst   2   Increase ROM by > 5 deg in all deficients planes  3   Increase CORE/LE by 1/2 MMT grade in all deficient planes  LT-6 WEEKS  1  Patient to be independent with a/iadls especially with increased walking, standing and steps  2  Increase functional activities for leisure and home activities to previous LOF    3  Independent with HEP and/or fitness program     Plan  Patient would benefit from: skilled physical therapy  Planned modality interventions: cryotherapy, electrical stimulation/Russian stimulation, thermotherapy: hydrocollator packs and unattended electrical stimulation  Planned therapy interventions: activity modification, behavior modification, body mechanics training, aquatic therapy, flexibility, functional ROM exercises, home exercise program, IADL retraining, joint mobilization, manual therapy, neuromuscular re-education, patient education, postural training, strengthening, stretching, therapeutic activities and therapeutic exercise  Frequency: 2x week (2-3x week)  Duration in weeks: 12  Plan of Care beginning date: 2022  Plan of Care expiration date: 2022  Treatment plan discussed with: patient        Subjective Evaluation    History of Present Illness  Date of onset: 3/4/2021  Mechanism of injury: Long history of low back pain with lumbar lami performed  In  but recent hospital visit in  for spasms and difficulty walking , had PT with some relief , continues with back pain and difficulty walking          Not a recurrent problem   Quality of life: good    Pain  Current pain ratin  At best pain ratin  At worst pain rating: 10  Quality: throbbing, pulling, pressure and squeezing  Relieving factors: ice, rest and medications  Aggravating factors: walking, stair climbing, standing and lifting  Progression: worsening    Social Support  Steps to enter house: yes  Stairs in house: yes   Lives in: multiple-level home  Lives with: adult children      Diagnostic Tests  X-ray: abnormal  MRI studies: abnormal  Treatments  Previous treatment: physical therapy and chiropractic  Patient Goals  Patient goals for therapy: decreased pain, increased motion, increased strength and independence with ADLs/IADLs          Objective     Concurrent Complaints  Positive for disturbed sleep and history of cancer  Static Posture     Lumbar Spine   Increased lordosis  Palpation   Left   Hypertonic in the erector spinae and lumbar paraspinals  Tenderness of the erector spinae  Right   Hypertonic in the erector spinae and lumbar paraspinals  Tenderness of the erector spinae       Tenderness Lumbar Spine  Tenderness in the spinous process  Left Hip   Tenderness in the PSIS  Active Range of Motion     Lumbar   Flexion: 60 degrees   Extension: 0 degrees   Left lateral flexion: 10 degrees       Right lateral flexion: 20 degrees     Strength/Myotome Testing     Left Hip   Planes of Motion   Flexion: 4-  Extension: 4  Abduction: 4  Adduction: 4+    Right Hip   Planes of Motion   Flexion: 4  Extension: 4  Abduction: 4  Adduction: 4+    Left Knee   Flexion: 4+  Extension: 4-    Right Knee   Flexion: 4+  Extension: 4-    Left Ankle/Foot   Dorsiflexion: 4  Plantar flexion: 4+    Right Ankle/Foot   Dorsiflexion: 4  Plantar flexion: 4+    Additional Strength Details  CORE is 3/5    Tests     Lumbar     Left   Negative passive SLR and slump test      Right   Negative passive SLR and slump test      Ambulation     Observational Gait   Gait: antalgic, asymmetric and crouched   Decreased walking speed and stride length       Functional Assessment        Single Leg Stance   Left: 0 seconds  Right: 1 seconds        Precautions:     Daily Treatment Diary     Manual                                                                                   Exercise Diary  8/8            Water walking 5            Postural training             Gait training             Home exercise pgm/patient education             Wall: t/h raises 1            Hip abd/add 2            Marching 1            squats 1            Knee flex/ext             Step-ups (fwd/bkwd/ss)             SLS (eyes open/closed)             SLS w UE mvmt  AROM/ball toss             Weight shifting             UE Noodle work x 4              UE AROM             Resistive UE work (catalina, adrien, TB)             Core work on noodle (sitting/stdg)             Sit on noodle with movement             Seated on pool bench w proper posture             Ankle df/pf             marching             Hip Ab/add             Knee flex/ext             Deep water mvmt 5 Deep water tx/stretching 5            Specific self - stretches wall/steps 5                Modalities  8/8            whirlpool 5

## 2022-08-10 ENCOUNTER — OFFICE VISIT (OUTPATIENT)
Dept: PHYSICAL THERAPY | Age: 62
End: 2022-08-10
Payer: COMMERCIAL

## 2022-08-10 DIAGNOSIS — M54.16 LUMBAR RADICULOPATHY: Primary | ICD-10-CM

## 2022-08-10 PROCEDURE — 97113 AQUATIC THERAPY/EXERCISES: CPT

## 2022-08-10 NOTE — PROGRESS NOTES
Daily Note     Today's date: 8/10/2022  Patient name: Jerson Marquez  : 1960  MRN: [id number]  Referring provider: Ty Cam MD  Dx:   Encounter Diagnosis     ICD-10-CM    1  Lumbar radiculopathy  M54 16        Start Time: 1600  Stop Time: 1700  Total time in clinic (min): 60 minutes    Subjective: Patient reports she felt a little sore after her initial visit, but not too bad  Objective: See treatment diary below      Assessment: Tolerated treatment fairly well  No c/o pain with TE, increased program with good tolerance, cues for ex technique  Patient demonstrated fatigue post treatment and would benefit from continued PT      Plan: Continue per plan of care  progress as tolerated       Precautions:     Daily Treatment Diary     Manual                                                                                   Exercise Diary  8/8 8/10           Water walking 5 10           Postural training             Gait training             Home exercise pgm/patient education             Wall: t/h raises 1 1           Hip abd/add 2 2            1 1           squats 1 1           Knee flex/ext  1           Step-ups (fwd/bkwd/ss)             SLS (eyes open/closed)             SLS w UE mvmt  AROM/ball toss             Weight shifting             UE Noodle work x 4   4'           UE AROM             Resistive UE work (paddles, bells, TB)             Core work on noodle (sitting/stdg)             Sit on noodle with movement             Seated on pool bench w proper posture  2           Ankle df/pf  1                      Hip Ab/add  1           Knee flex/ext  1           Deep water mvmt 5 5           Deep water tx/stretching 5 10           Specific self - stretches wall/steps 5 3               Modalities  8/8 8/10           whirlpool 5 10

## 2022-08-17 ENCOUNTER — OFFICE VISIT (OUTPATIENT)
Dept: PHYSICAL THERAPY | Age: 62
End: 2022-08-17
Payer: COMMERCIAL

## 2022-08-17 DIAGNOSIS — M54.16 LUMBAR RADICULOPATHY: Primary | ICD-10-CM

## 2022-08-17 PROCEDURE — 97113 AQUATIC THERAPY/EXERCISES: CPT

## 2022-08-17 NOTE — PROGRESS NOTES
Daily Note     Today's date: 2022  Patient name: Jessica Lindsey  : 1960  MRN: [id number]  Referring provider: Veena Miller MD  Dx:   Encounter Diagnosis     ICD-10-CM    1  Lumbar radiculopathy  M54 16        Start Time:   Stop Time: 1630  Total time in clinic (min): 60 minutes    Subjective: Patient reports 4-5/10 LBp today, notes [facility] therapy is helping  Objective: See treatment diary below      Assessment: Tolerated treatment fairly well, increased program adding theraband rows and ext focusing on core strengthening and activation  Cues for carry over  Patient exhibited good technique with therapeutic exercises and would benefit from continued PT    1:1 40 minutes with therapy     Plan: Continue per plan of care        Precautions:     Daily Treatment Diary     Manual                                                                                   Exercise Diary  8/8 8/10 8/17          Water walking 5 10 10          Postural training             Gait training             Home exercise pgm/patient education             Wall: t/h raises 1 1 1          Hip abd/add 2 2 2          Marching 1 1 1          squats 1 1 1          Knee flex/ext  1 1          Step-ups (fwd/bkwd/ss)             FF/EXT swing   2          SLS w UE mvmt  AROM/ball toss             Weight shifting             UE Noodle work x 4   4' 4'          UE AROM             Resistive UE work (TB)   2' blue          IRVING   10x          Sit on noodle with movement             Seated on pool bench w proper posture  2 2          Ankle df/pf  1 1          marching  1 1          Hip Ab/add  1 1          Knee flex/ext  1 1          Deep water mvmt 5 5 6          Deep water tx/stretching 5 10 10          Specific self - stretches wall/steps 5 3 3              Modalities  8/8 8/10 8/17          whirlpool 5 10 10

## 2022-08-19 ENCOUNTER — OFFICE VISIT (OUTPATIENT)
Dept: PHYSICAL THERAPY | Age: 62
End: 2022-08-19
Payer: COMMERCIAL

## 2022-08-19 DIAGNOSIS — M54.16 LUMBAR RADICULOPATHY: Primary | ICD-10-CM

## 2022-08-19 PROCEDURE — 97113 AQUATIC THERAPY/EXERCISES: CPT

## 2022-08-19 NOTE — PROGRESS NOTES
Daily Note     Today's date: 2022  Patient name: Kodak Mccord  : 1960  MRN: [id number]  Referring provider: Micah Carvalho MD  Dx:   Encounter Diagnosis     ICD-10-CM    1  Lumbar radiculopathy  M54 16        Start Time: 1600  Stop Time: 1700  Total time in clinic (min): 60 minutes    Subjective: Patient reports 3-4/10 pain today, reports she is getting better, able to walk better from house to the car  Objective: See treatment diary below      Assessment: Tolerated treatment well, no c/o pain with TE, increased program today adding UE exercises with 1# mb and increased resistance with theraband core activities  Cues for ex technique and posture due ot forward flexed posture  Patient exhibited good technique with therapeutic exercises and would benefit from continued PT      Plan: Continue per plan of care        Precautions:     Daily Treatment Diary     Manual                                                                                   Exercise Diary  8/8 8/10 8/17 8/19         Water walking 5 10 10 10         Postural training             Gait training             Home exercise pgm/patient education             Wall: t/h raises 1 1 1 1         Hip abd/add 2 2 2 2         Marching 1 1 1 1         squats 1 1 1 1         Knee flex/ext  1 1 1         Step-ups (fwd/bkwd/ss)             FF/EXT swing   2 2         SLS w UE mvmt  AROM/ball toss             Weight shifting             UE Noodle work x 4   4' 4' 3         UE AROM    1# 4'         Resistive UE work (TB)   2' blue 2' blk         IRVING   10x 10x         Sit on noodle with movement             Seated on pool bench w proper posture  2 2 2         Ankle df/pf  1 1 1         marching  1 1 1         Hip Ab/add  1 1 1         Knee flex/ext  1 1 1         Deep water mvmt 5 5 6 6         Deep water tx/stretching 5 10 10 10         Specific self - stretches wall/steps 5 3 3 3             Modalities  8/8 8/10 8/17 8/19 whirlpool 5 10 10 10

## 2022-08-22 ENCOUNTER — OFFICE VISIT (OUTPATIENT)
Dept: PHYSICAL THERAPY | Age: 62
End: 2022-08-22
Payer: COMMERCIAL

## 2022-08-22 DIAGNOSIS — M54.16 LUMBAR RADICULOPATHY: Primary | ICD-10-CM

## 2022-08-22 PROCEDURE — 97113 AQUATIC THERAPY/EXERCISES: CPT

## 2022-08-22 NOTE — PROGRESS NOTES
Daily Note     Today's date: 2022  Patient name: Desiree Red  : 1960  MRN: [id number]  Referring provider: Holly Barreto MD  Dx:   Encounter Diagnosis     ICD-10-CM    1  Lumbar radiculopathy  M54 16        Start Time: 1700  Stop Time: 1800  Total time in clinic (min): 60 minutes    Subjective: pt notes LBP today 3-4/10 " I like the water "      Objective: See treatment diary below      Assessment: Tolerated treatment well  Challenged pt today w/ paddles during UE ex's and MF press  Overall pt is doing well w/ all ex's  She shows improved posture w/ water walking and increased endurance for ex program  Patient would benefit from continued PT      Plan: Continue per plan of care        Precautions: B TKR, CA, HTN, Obesity    Daily Treatment Diary     Manual                                                                                   Exercise Diary  8/8 8/10 8/17 8/19 8/22        Water walking 5 10 10 10 10        Postural training             Gait training             Home exercise pgm/patient education             Wall: t/h raises 1 1 1 1 1        Hip abd/add 2 2 2 2 2        Marching 1 1 1 1 1        squats 1 1 1 1 1        Knee flex/ext  1 1 1 1        Step-ups (fwd/bkwd/ss)             FF/EXT swing   2 2 2        SLS w UE mvmt  AROM/ball toss             Weight shifting             UE Noodle work x 4   4' 4' 3 2        UE AROM    1# 4' OB 4'        Resistive UE work (TB)   2' blue 2' blk 2        IRVING   10x 10x x20        Sit on noodle with movement             Seated on pool bench w proper posture  2 2 2 D/c seated ex  s        Ankle df/pf  1 1 1         marching  1 1 1         Hip Ab/add  1 1 1         Knee flex/ext  1 1 1         Deep water mvmt 5 5 6 6 7        Deep water tx/stretching 5 10 10 10 10        Specific self - stretches wall/steps 5 3 3 3 2            Modalities  8/8 8/10 8/17 8/19 8/22        whirlpool 5 10 10 10 10

## 2022-08-24 ENCOUNTER — OFFICE VISIT (OUTPATIENT)
Dept: PHYSICAL THERAPY | Age: 62
End: 2022-08-24
Payer: COMMERCIAL

## 2022-08-24 DIAGNOSIS — M54.16 LUMBAR RADICULOPATHY: Primary | ICD-10-CM

## 2022-08-24 PROCEDURE — 97113 AQUATIC THERAPY/EXERCISES: CPT

## 2022-08-24 NOTE — PROGRESS NOTES
Daily Note     Today's date: 2022  Patient name: Wen Toth  : 1960  MRN: [id number]  Referring provider: Trey Villegas MD  Dx:   Encounter Diagnosis     ICD-10-CM    1  Lumbar radiculopathy  M54 16        Start Time:   Stop Time: 7553  Total time in clinic (min): 60 minutes    Subjective: Patient reports 3-4/10 LBP today  Patient notes she is more aware of her posture now  Objective: See treatment diary below      Assessment: Tolerated treatment well, good movements in the water, continue to focus on core strength and activation throughout program   Patient demonstrated fatigue post treatment and would benefit from continued PT      Plan: Continue per plan of care        Precautions: B TKR, CA, HTN, Obesity    Daily Treatment Diary     Manual                                                                                   Exercise Diary  8/8 8/10 8/17 8/19 8/22 8/24       Water walking 5 10 10 10 10 10       Postural training             Gait training             Home exercise pgm/patient education             Wall: t/h raises 1 1 1 1 1 1       Hip abd/add 2 2 2 2 2 2       Marching 1 1 1 1 1 1       squats 1 1 1 1 1 1       Knee flex/ext  1 1 1 1 1       Step-ups (fwd/bkwd/ss)             FF/EXT swing   2 2 2 2       SLS w UE mvmt  AROM/ball toss             Weight shifting             UE Noodle work x 4   4' 4' 3 2 2       UE AROM    1# 4' OB 4' 4'       Resistive UE work (TB)   2' blue 2' blk 2 2       IRVING   10x 10x x20 20x       Sit on noodle with movement             Seated on pool bench w proper posture  2 2 2 D/c seated ex  s        Ankle df/pf  1 1 1         marching  1 1 1         Hip Ab/add  1 1 1         Knee flex/ext  1 1 1         Deep water mvmt 5 5 6 6 7 7       Deep water tx/stretching 5 10 10 10 10 10       Specific self - stretches wall/steps 5 3 3 3 2 2           Modalities  8/8 8/10 8/17 8/19 8/22 8/24       whirlpool 5 10 10 10 10 10

## 2022-08-31 ENCOUNTER — OFFICE VISIT (OUTPATIENT)
Dept: PHYSICAL THERAPY | Age: 62
End: 2022-08-31
Payer: COMMERCIAL

## 2022-08-31 DIAGNOSIS — M54.16 LUMBAR RADICULOPATHY: Primary | ICD-10-CM

## 2022-08-31 PROCEDURE — 97113 AQUATIC THERAPY/EXERCISES: CPT

## 2022-08-31 NOTE — PROGRESS NOTES
Daily Note     Today's date: 2022  Patient name: Jerson Marquez  : 1960  MRN: [id number]  Referring provider: Ty Cam MD  Dx:   Encounter Diagnosis     ICD-10-CM    1  Lumbar radiculopathy  M54 16        Start Time: 1600  Stop Time: 1700  Total time in clinic (min): 60 minutes    Subjective: Patient reports she is getting a little better with therapy, notes she has been tolerating more activities at home  Objective: See treatment diary below      Assessment: Tolerated treatment well, no c/o pain with TE, good movements in the water, challenged with increasing her dw biking and MF press hold time  Patient exhibited good technique with therapeutic exercises and would benefit from continued PT      Plan: Continue per plan of care        Precautions: B TKR, CA, HTN, Obesity    Daily Treatment Diary     Manual                                                                                   Exercise Diary  8/8 8/10 8/17 8/19 8/22 8/24       Water walking 5 10 10 10 10 10       Postural training             Gait training             Home exercise pgm/patient education             Wall: t/h raises 1 1 1 1 1 1       Hip abd/add 2 2 2 2 2 2       Marching 1 1 1 1 1 1       squats 1 1 1 1 1 1       Knee flex/ext  1 1 1 1 1       Step-ups (fwd/bkwd/ss)             FF/EXT swing   2 2 2 2       SLS w UE mvmt  AROM/ball toss             Weight shifting             UE Noodle work x 4   4' 4' 3 2 2       UE AROM    1# 4' OB 4' 4'       Resistive UE work (TB)   2' blue 2' blk 2 2       IRVING   10x 10x x20 20x       Sit on noodle with movement             Seated on pool bench w proper posture  2 2 2 D/c seated ex  s        Ankle df/pf  1 1 1           1 1 1         Hip Ab/add  1 1 1         Knee flex/ext  1 1 1         Deep water mvmt 5 5 6 6 7 7       Deep water tx/stretching 5 10 10 10 10 10       Specific self - stretches wall/steps 5 3 3 3 2 2           Modalities  8/8 8/10 8/17 8/19 8/22 8/24       whirlpool 5 10 10 10 10 10

## 2022-09-02 ENCOUNTER — EVALUATION (OUTPATIENT)
Dept: PHYSICAL THERAPY | Age: 62
End: 2022-09-02
Payer: COMMERCIAL

## 2022-09-02 DIAGNOSIS — M54.16 LUMBAR RADICULOPATHY: Primary | ICD-10-CM

## 2022-09-02 PROCEDURE — 97113 AQUATIC THERAPY/EXERCISES: CPT | Performed by: PHYSICAL THERAPIST

## 2022-09-02 NOTE — PROGRESS NOTES
PT Re-Evaluation     Today's date: 2022  Patient name: Donovan Dumont  : 1960  MRN: [id number]  Referring provider: Marianna Quintana MD  Dx:   Encounter Diagnosis     ICD-10-CM    1  Lumbar radiculopathy  M54 16 PT plan of care cert/re-cert       Start Time: 1600  Stop Time: 1700  Total time in clinic (min): 60 minutes    Assessment  Assessment details: 2022, patient demonstrates decreased pain and increased ROM ands strength after 8 PT visits and can now perform some housework  Patient demonstrates improved FOTO scores and can now walk further  Impairments: abnormal gait, abnormal muscle tone, abnormal or restricted ROM, abnormal movement, activity intolerance, impaired balance, impaired physical strength, lacks appropriate home exercise program, pain with function, safety issue, weight-bearing intolerance, poor posture  and poor body mechanics  Understanding of Dx/Px/POC: good   Prognosis: good    Goals  ST-3 WEEKS  1  Decrease pain by 2 points on VAS at its worst MET  2  Increase ROM by > 5 deg in all deficients planes  MET  3  Increase CORE/LE by 1/2 MMT grade in all deficient planes  WORKING TOWARDS    LT-6 WEEKS  1  Patient to be independent with a/iadls especially with increased walking, standing and steps  WORKING TOWARDS  2  Increase functional activities for leisure and home activities to previous LOF  3  Independent with HEP and/or fitness program   NEW GOAL  1   Patient will continue with aggressive exercises so that she will be able to don/doff socks/shoues    Plan  Patient would benefit from: skilled physical therapy  Planned modality interventions: cryotherapy, electrical stimulation/Russian stimulation, thermotherapy: hydrocollator packs and unattended electrical stimulation  Planned therapy interventions: activity modification, behavior modification, body mechanics training, aquatic therapy, flexibility, functional ROM exercises, home exercise program, IADL retraining, joint mobilization, manual therapy, neuromuscular re-education, patient education, postural training, strengthening, stretching, therapeutic activities and therapeutic exercise  Frequency: 2x week (2-3x week)  Duration in weeks: 6  Plan of Care beginning date: 2022  Plan of Care expiration date: 2022  Treatment plan discussed with: patient        Subjective Evaluation    History of Present Illness  Date of onset: 3/4/2021  Mechanism of injury: 2022, patient reports decreased pain and increased ROM and strength  after 8 PT visits          Not a recurrent problem   Quality of life: good    Pain  Current pain ratin  At best pain ratin  At worst pain ratin  Quality: throbbing, pulling, pressure and squeezing  Relieving factors: ice, rest and medications  Aggravating factors: walking, stair climbing, standing and lifting  Progression: improved    Social Support  Steps to enter house: yes  Stairs in house: yes   Lives in: multiple-level home  Lives with: adult children      Diagnostic Tests  X-ray: abnormal  MRI studies: abnormal  Treatments  Previous treatment: physical therapy and chiropractic  Patient Goals  Patient goals for therapy: decreased pain, increased motion, increased strength and independence with ADLs/IADLs          Objective     Concurrent Complaints  Positive for history of cancer  Negative for disturbed sleep    Static Posture     Lumbar Spine   Increased lordosis  Palpation   Left   Hypertonic in the erector spinae and lumbar paraspinals  Tenderness of the erector spinae  Right   Hypertonic in the erector spinae and lumbar paraspinals  Tenderness     Lumbar Spine  Tenderness in the spinous process  Left Hip   Tenderness in the PSIS       Active Range of Motion     Lumbar   Flexion: 65 degrees   Extension: 5 degrees   Left lateral flexion: 15 degrees       Right lateral flexion: 20 degrees     Strength/Myotome Testing     Left Hip   Planes of Motion Flexion: 4  Extension: 4  Abduction: 4  Adduction: 4+    Right Hip   Planes of Motion   Flexion: 4  Extension: 4  Abduction: 4  Adduction: 4+    Left Knee   Flexion: 4+  Extension: 4    Right Knee   Flexion: 4+  Extension: 4    Left Ankle/Foot   Dorsiflexion: 4  Plantar flexion: 4+    Right Ankle/Foot   Dorsiflexion: 4  Plantar flexion: 4+    Additional Strength Details  CORE is 3/5    Tests     Lumbar     Left   Negative passive SLR and slump test      Right   Negative passive SLR and slump test      Ambulation     Observational Gait   Gait: antalgic and crouched   Decreased walking speed and stride length       Functional Assessment        Single Leg Stance   Left: 2 seconds  Right: 3 seconds      Flowsheet Rows    Flowsheet Row Most Recent Value   PT/OT G-Codes    Current Score 60   Projected Score 42              Precautions: B TKR, CA, HTN, Obesity    Daily Treatment Diary     Manual                                                                                   Exercise Diary  8/8 8/10 8/17 8/19 8/22 8/24 9/2      Water walking 5 10 10 10 10 10 10      Postural training             Gait training             Home exercise pgm/patient education             Wall: t/h raises 1 1 1 1 1 1 1      Hip abd/add 2 2 2 2 2 2 2      Marching 1 1 1 1 1 1 1      squats 1 1 1 1 1 1 1      Knee flex/ext  1 1 1 1 1 1      Step-ups (fwd/bkwd/ss)             FF/EXT swing   2 2 2 2 2      SLS w UE mvmt  AROM/ball toss             Weight shifting             UE Noodle work x 4   4' 4' 3 2 2 2      UE AROM    1# 4' OB 4' 4' 4      Resistive UE work (TB)   2' blue 2' blk 2 2 2      IRVING   10x 10x x20 20x 20x      Sit on noodle with movement             Seated on pool bench w proper posture  2 2 2 D/c seated ex  s        Ankle df/pf  1 1 1         marching  1 1 1         Hip Ab/add  1 1 1         Knee flex/ext  1 1 1         Deep water mvmt 5 5 6 6 7 7 7      Deep water tx/stretching 5 10 10 10 10 10 10      Specific self - stretches wall/steps 5 3 3 3 2 2 2          Modalities  8/8 8/10 8/17 8/19 8/22 8/24 9/2      whirlpool 5 10 10 10 10 10 10

## 2022-09-07 ENCOUNTER — OFFICE VISIT (OUTPATIENT)
Dept: PHYSICAL THERAPY | Age: 62
End: 2022-09-07
Payer: COMMERCIAL

## 2022-09-07 DIAGNOSIS — M54.16 LUMBAR RADICULOPATHY: Primary | ICD-10-CM

## 2022-09-07 PROCEDURE — 97113 AQUATIC THERAPY/EXERCISES: CPT

## 2022-09-07 NOTE — PROGRESS NOTES
Daily Note     Today's date: 2022  Patient name: Jessica Lindsey  : 1960  MRN: [id number]  Referring provider: Veena Miller MD  Dx:   Encounter Diagnosis     ICD-10-CM    1  Lumbar radiculopathy  M54 16        Start Time: 1700  Stop Time: 1800  Total time in clinic (min): 60 minutes    Subjective: Patient reports she [place], short distances  C/o pulling in her LB today  Objective: See treatment diary below      Assessment: Tolerated treatment well, no c/o apian with TE, good movements in the water, increased walking time and DW biking today  Patient exhibited good technique with therapeutic exercises and would benefit from continued PT      Plan: Continue per plan of care        Precautions: B TKR, CA, HTN, Obesity    Daily Treatment Diary     Manual                                                                                   Exercise Diary  8/8 8/10 8/17 8/19 8/22 8/24 9/2 9/7     Water walking 5 10 10 10 10 10 10 10     Postural training             Gait training             Home exercise pgm/patient education             Wall: t/h raises 1 1 1 1 1 1 1 1     Hip abd/add 2 2 2 2 2 2 2 2     Marching 1 1 1 1 1 1 1 1     squats 1 1 1 1 1 1 1 1     Knee flex/ext  1 1 1 1 1 1 1     Step-ups (fwd/bkwd/ss)             FF/EXT swing   2 2 2 2 2 2     SLS w UE mvmt  AROM/ball toss             Weight shifting             UE Noodle work x 4   4' 4' 3 2 2 2 2     UE AROM    1# 4' OB 4' 4' 4 4     Resistive UE work (TB)   2' blue 2' blk 2 2 2 2     IRVING   10x 10x x20 20x 20x 20x     Sit on noodle with movement             Seated on pool bench w proper posture  2 2 2 D/c seated ex  s        Ankle df/pf  1 1 1         marching  1 1 1         Hip Ab/add  1 1 1         Knee flex/ext  1 1 1         Deep water mvmt 5 5 6 6 7 7 7 8     Deep water tx/stretching 5 10 10 10 10 10 10 8     Specific self - stretches wall/steps 5 3 3 3 2 2 2 2         Modalities 8/8 8/10 8/17 8/19 8/22 8/24 9/2 9/7     whirlpool 5 10 10 10 10 10 10 10

## 2022-09-09 ENCOUNTER — OFFICE VISIT (OUTPATIENT)
Dept: PHYSICAL THERAPY | Age: 62
End: 2022-09-09
Payer: COMMERCIAL

## 2022-09-09 DIAGNOSIS — M54.16 LUMBAR RADICULOPATHY: Primary | ICD-10-CM

## 2022-09-09 PROCEDURE — 97113 AQUATIC THERAPY/EXERCISES: CPT

## 2022-09-09 NOTE — PROGRESS NOTES
Daily Note     Today's date: 2022  Patient name: Pam Art  : 1960  MRN: [id number]  Referring provider: Jack Arroyo MD  Dx:   Encounter Diagnosis     ICD-10-CM    1  Lumbar radiculopathy  M54 16        Start Time: 1600  Stop Time: 1700  Total time in clinic (min): 60 minutes    Subjective: Patient reports increased pain LB, notes she tweaked it getting into the car coming here  Objective: See treatment diary below      Assessment: Tolerated treatment fairly well, continue ot focus on cores strengthening and functional mobility, no c/o pain with TE  Some cues for ex technique  Continues to have challenges getting in/out pool  Patient exhibited good technique with therapeutic exercises and would benefit from continued PT      Plan: Continue per plan of care        Precautions: B TKR, CA, HTN, Obesity    Daily Treatment Diary     Manual                                                                                   Exercise Diary  8/8 8/10 8/17 8/19 8/22 8/24 9/2 9/7 9/9    Water walking 5 10 10 10 10 10 10 10 10    Postural training             Gait training             Home exercise pgm/patient education             Wall: t/h raises 1 1 1 1 1 1 1 1 1    Hip abd/add 2 2 2 2 2 2 2 2 2    Marching 1 1 1 1 1 1 1 1 1    squats 1 1 1 1 1 1 1 1 1    Knee flex/ext  1 1 1 1 1 1 1 1    Step-ups (fwd/bkwd/ss)             FF/EXT swing   2 2 2 2 2 2 2    SLS w UE mvmt  AROM/ball toss             Weight shifting             UE Noodle work x 4   4' 4' 3 2 2 2 2 2    UE AROM    1# 4' OB 4' 4' 4 4 4    Resistive UE work (TB)   2' blue 2' blk 2 2 2 2 2    IRVING   10x 10x x20 20x 20x 20x 20x    Sit on noodle with movement             Seated on pool bench w proper posture  2 2 2 D/c seated ex  s        Ankle df/pf  1 1 1         marching  1 1 1         Hip Ab/add  1 1 1         Knee flex/ext  1 1 1         Deep water mvmt 5 5 6 6 7 7 7 8 6    Deep water tx/stretching 5 10 10 10 10 10 10 8 10 Specific self - stretches wall/steps 5 3 3 3 2 2 2 2 2        Modalities  8/8 8/10 8/17 8/19 8/22 8/24 9/2 9/7 9/9    whirlpool 5 10 10 10 10 10 10 10 10

## 2022-09-14 ENCOUNTER — OFFICE VISIT (OUTPATIENT)
Dept: PHYSICAL THERAPY | Age: 62
End: 2022-09-14
Payer: COMMERCIAL

## 2022-09-14 DIAGNOSIS — M54.16 LUMBAR RADICULOPATHY: Primary | ICD-10-CM

## 2022-09-14 PROCEDURE — 97113 AQUATIC THERAPY/EXERCISES: CPT

## 2022-09-14 NOTE — PROGRESS NOTES
Daily Note     Today's date: 2022  Patient name: Elías Dinero  : 1960  MRN: [id number]  Referring provider: Otoniel Snow MD  Dx:   Encounter Diagnosis     ICD-10-CM    1  Lumbar radiculopathy  M54 16        Start Time: 1700  Stop Time: 1800  Total time in clinic (min): 60 minutes    Subjective: Patient reports getting better, notes she drove to therapy today  Objective: See treatment diary below      Assessment: Tolerated treatment well, good movement sin the water, no c/o pain with TE, continue to focus on core strength and activation  Patient exhibited good technique with therapeutic exercises and would benefit from continued PT    1:1 40 minutes   Plan: Continue per plan of care        Precautions: B TKR, CA, HTN, Obesity    Daily Treatment Diary     Manual                                                                                   Exercise Diary  8/8 8/10 8/17 8/19 8/22 8/24 9/2 9/7 9/9 9/14   Water walking 5 10 10 10 10 10 10 10 10 10   Postural training             Gait training             Home exercise pgm/patient education             Wall: t/h raises 1 1 1 1 1 1 1 1 1 1   Hip abd/add 2 2 2 2 2 2 2 2 2 2    1 1 1 1 1 1 1 1 1 1   squats 1 1 1 1 1 1 1 1 1 1   Knee flex/ext  1 1 1 1 1 1 1 1 1   Step-ups (fwd/bkwd/ss)             FF/EXT swing   2 2 2 2 2 2 2 2   SLS w UE mvmt  AROM/ball toss             Weight shifting             UE Noodle work x 4   4' 4' 3 2 2 2 2 2 2   UE AROM    1# 4' OB 4' 4' 4 4 4 4   Resistive UE work (TB)   2' blue 2' blk 2 2 2 2 2 2   IRVING   10x 10x x20 20x 20x 20x 20x 20x   Sit on noodle with movement             Seated on pool bench w proper posture  2 2 2 D/c seated ex  s        Ankle df/pf  1 1 1           1 1 1         Hip Ab/add  1 1 1         Knee flex/ext  1 1 1         Deep water mvmt 5 5 6 6 7 7 7 8 6 6   Deep water tx/stretching 5 10 10 10 10 10 10 8 10 10   Specific self - stretches wall/steps 5 3 3 3 2 2 2 2 2 2 Modalities  8/8 8/10 8/17 8/19 8/22 8/24 9/2 9/7 9/9 9/14   whirlpool 5 10 10 10 10 10 10 10 10 10

## 2022-09-16 ENCOUNTER — OFFICE VISIT (OUTPATIENT)
Dept: PHYSICAL THERAPY | Age: 62
End: 2022-09-16
Payer: COMMERCIAL

## 2022-09-16 ENCOUNTER — OFFICE VISIT (OUTPATIENT)
Dept: PAIN MEDICINE | Facility: CLINIC | Age: 62
End: 2022-09-16
Payer: COMMERCIAL

## 2022-09-16 VITALS
WEIGHT: 293 LBS | SYSTOLIC BLOOD PRESSURE: 133 MMHG | HEIGHT: 72 IN | BODY MASS INDEX: 39.68 KG/M2 | DIASTOLIC BLOOD PRESSURE: 85 MMHG | HEART RATE: 99 BPM

## 2022-09-16 DIAGNOSIS — G89.4 CHRONIC PAIN SYNDROME: ICD-10-CM

## 2022-09-16 DIAGNOSIS — M54.16 LUMBAR RADICULOPATHY: Primary | ICD-10-CM

## 2022-09-16 PROCEDURE — 99213 OFFICE O/P EST LOW 20 MIN: CPT | Performed by: NURSE PRACTITIONER

## 2022-09-16 PROCEDURE — 97113 AQUATIC THERAPY/EXERCISES: CPT

## 2022-09-16 NOTE — PROGRESS NOTES
Daily Note     Today's date: 2022  Patient name: Juliet Osgood  : 1960  MRN: [id number]  Referring provider: Lilibeth Rodriguez MD  Dx:   Encounter Diagnosis     ICD-10-CM    1  Lumbar radiculopathy  M54 16        Start Time: 1600  Stop Time: 1700  Total time in clinic (min): 60 minutes    Subjective: Patient reports she is doing good, getting better, note she saw md today and they are is seeing an improvement      Objective: See treatment diary below      Assessment: Tolerated treatment well, no c/o pain with TE, continue to focus on core strengthening to improve her posture  Overall posture and strength improving  Patient exhibited good technique with therapeutic exercises and would benefit from continued PT      Plan: Continue per plan of care        Precautions: B TKR, CA, HTN, Obesity    Daily Treatment Diary     Manual                                                                                   Exercise Diary     Water walking 12 10 10 10 10 10 10 10 10 10   Postural training             Gait training             Home exercise pgm/patient education             Wall: t/h raises 1 1 1 1 1 1 1 1 1 1   Hip abd/add 2 2 2 2 2 2 2 2 2 2   Marching 1 1 1 1 1 1 1 1 1 1   squats 1 1 1 1 1 1 1 1 1 1   Knee flex/ext 1 1 1 1 1 1 1 1 1 1   Step-ups (fwd/bkwd/ss)             FF/EXT swing 2  2 2 2 2 2 2 2 2   SLS w UE mvmt  AROM/ball toss             Weight shifting             UE Noodle work x 4  2 4' 4' 3 2 2 2 2 2 2   UE AROM 4   1# 4' OB 4' 4' 4 4 4 4   Resistive UE work (TB) 2  2' blue 2' blk 2 2 2 2 2 2   IRVING 20x  10x 10x x20 20x 20x 20x 20x 20x   Sit on noodle with movement             Seated on pool bench w proper posture  2 2 2 D/c seated ex  s        Ankle df/pf  1 1 1         marching  1 1 1         Hip Ab/add  1 1 1         Knee flex/ext  1 1 1         Deep water mvmt 6 5 6 6 7 7 7 8 6 6   Deep water tx/stretching 10 10 10 10 10 10 10 8 10 10 Specific self - stretches wall/steps 2 3 3 3 2 2 2 2 2 2       Modalities  9/16 8/17 8/19 8/22 8/24 9/2 9/7 9/9 9/14   whirlpool 10 10 10 10 10 10 10 10 10 10

## 2022-09-16 NOTE — PROGRESS NOTES
Assessment:  1  Lumbar radiculopathy    2  Chronic pain syndrome        Plan:  While the patient was in the office today, I did have a thorough conversation regarding their chronic pain syndrome, medication management, and treatment plan options  Patient is being seen for follow-up visit  She was initially seen here for consultation on 07/25/2022  She was started on Cymbalta 30 mg at bedtime and referred to aqua therapy  Patient states that she never really started Cymbalta, but has been attending aqua therapy regularly since 08/08/2022  Her pain is 40-50% improved  Overall, she states that she has less pain and is moving around better  Continue aqua therapy  Follow-up in 1 month  History of Present Illness: The patient is a 58 y o  female who presents for a follow up office visit in regards to Back Pain  The patients current symptoms include complaints of low back pain  Current pain level is a 3-4/10  Quality pain is described as dull and aching  Current pain medications includes:  None    I have personally reviewed and/or updated the patient's past medical history, past surgical history, family history, social history, current medications, allergies, and vital signs today  Review of Systems  Review of Systems   Constitutional: Negative for unexpected weight change  HENT: Negative for hearing loss  Eyes: Negative for visual disturbance  Respiratory: Negative for shortness of breath  Cardiovascular: Negative for leg swelling  Gastrointestinal: Negative for constipation  Endocrine: Negative for polyuria  Genitourinary: Negative for difficulty urinating  Musculoskeletal: Positive for gait problem and myalgias  Negative for joint swelling  Decreased range of motion  Joint stiffness   Skin: Negative for rash  Neurological: Negative for weakness and headaches  Psychiatric/Behavioral: Negative for decreased concentration     All other systems reviewed and are negative          Past Medical History:   Diagnosis Date    Cancer ([facility] [address] )     uterine    Chronic midline low back pain     Hypertension     Morbid obesity ([facility] [address] )     Parity 2     Sleep apnea        Past Surgical History:   Procedure Laterality Date    BACK SURGERY      CHOLECYSTECTOMY      COLONOSCOPY      DILATION AND CURETTAGE OF UTERUS      HYSTERECTOMY      JOINT REPLACEMENT Bilateral     KNEES    LAMINECTOMY      LAPAROSCOPY      MOUTH SURGERY      MULTIPLE TOOTH EXTRACTIONS      OOPHORECTOMY      [facility] COLONOSCOPY FLX DX W/COLLJ SPEC WHEN PFRMD N/A 9/11/2017    Procedure: COLONOSCOPY;  Surgeon: Emeli Frazier MD;  Location: [facility];  Service: Colorectal    [facility] LAPAROSCOPY W TOT HYSTERECTUTERUS <=250 GRAM  W TUBE/OVARY N/A 10/5/2017    Procedure: ROBOTIC LAPAROSCOPIC HYSTERECTOMY; BILATERAL SALPINGO-OOPHERECTOMY, CYSTO;  Surgeon: Ronan Espitia MD;  Location:  MAIN OR;  Service: Gynecology Oncology    REPLACEMENT TOTAL KNEE      STOMACH SURGERY      gastric stapling    TUBAL LIGATION         Family History   Problem Relation Age of Onset    Endometrial cancer Mother     No Known Problems Daughter     No Known Problems Maternal Grandmother     No Known Problems Maternal Grandfather     No Known Problems Paternal Grandmother     No Known Problems Paternal Grandfather     No Known Problems Daughter     No Known Problems Maternal Aunt     No Known Problems Paternal Aunt     Breast cancer Cousin        Social History     Occupational History    Not on file   Tobacco Use    Smoking status: Never Smoker    Smokeless tobacco: Never Used   Substance and Sexual Activity    Alcohol use: No    Drug use: No    Sexual activity: Never         Current Outpatient Medications:     aspirin (ECOTRIN LOW STRENGTH) 81 mg EC tablet, Take 81 mg by mouth daily, Disp: , Rfl:     metFORMIN (GLUCOPHAGE) 500 mg tablet, Take 500 mg by mouth 2 (two) times a day with meals, Disp: , Rfl:    Multiple Vitamin (MULTIVITAMIN) tablet, Take 1 tablet by mouth daily, Disp: , Rfl:     naproxen (NAPROSYN) 500 mg tablet, , Disp: , Rfl:     valsartan-hydrochlorothiazide (DIOVAN-HCT) 160-12 5 MG per tablet, Take 1 tablet by mouth daily, Disp: , Rfl:     DULoxetine (CYMBALTA) 30 mg delayed release capsule, Take 1 capsule (30 mg total) by mouth daily, Disp: 30 capsule, Rfl: 1    No Known Allergies    Physical Exam:    /85   Pulse 99   Ht 6' (1 829 m)   Wt (!) 188 kg (415 lb)   BMI 56 28 kg/m²     Constitutional:normal, well developed, well nourished, alert, in no distress and non-toxic and no overt pain behavior  and obese  Eyes:anicteric  HEENT:grossly intact  Neck:supple, symmetric, trachea midline and no masses   Pulmonary:even and unlabored  Cardiovascular:No edema or pitting edema present  Skin:Normal without rashes or lesions and well hydrated  Psychiatric:Mood and affect appropriate  Neurologic:Cranial Nerves II-XII grossly intact  Musculoskeletal:ambulates with cane and Gait is slow and guarded  Imaging  No orders to display       No orders of the defined types were placed in this encounter

## 2022-09-21 ENCOUNTER — OFFICE VISIT (OUTPATIENT)
Dept: PHYSICAL THERAPY | Age: 62
End: 2022-09-21
Payer: COMMERCIAL

## 2022-09-21 DIAGNOSIS — M54.16 LUMBAR RADICULOPATHY: Primary | ICD-10-CM

## 2022-09-21 PROCEDURE — 97113 AQUATIC THERAPY/EXERCISES: CPT

## 2022-09-21 NOTE — PROGRESS NOTES
Daily Note     Today's date: 2022  Patient name: Kodak Mccord  : 1960  MRN: [id number]  Referring provider: Micah Carvalho MD  Dx:   Encounter Diagnosis     ICD-10-CM    1  Lumbar radiculopathy  M54 16        Start Time: 1600  Stop Time: 1700  Total time in clinic (min): 60 minutes    Subjective: Patient offers no new complaints today  Objective: See treatment diary below      Assessment: Tolerated treatment well  Patient exhibited good technique with therapeutic exercises  Overall, patient did well in therapy improving her functional mobility, strength and core stability  She continues to have forward flexed posture but has improved since day one of therapy  Plan: Patient will be d/c'ed to independent HEP and is joining our step down/fitness program to continue with improving functional fitness  Primary therapist will d/c orders         Precautions: B TKR, CA, HTN, Obesity    Daily Treatment Diary       Exercise Diary     Water walking 12 10 10 10 10 10 10 10 10 10   Postural training             Gait training             Home exercise pgm/patient education             Wall: t/h raises 1 1 1 1 1 1 1 1 1 1   Hip abd/add 2 2 2 2 2 2 2 2 2 2    1 1 1 1 1 1 1 1 1 1   squats 1 1 1 1 1 1 1 1 1 1   Knee flex/ext 1 1 1 1 1 1 1 1 1 1   Step-ups (fwd/bkwd/ss)             FF/EXT swing 2 2 2 2 2 2 2 2 2 2   SLS w UE mvmt  AROM/ball toss             Weight shifting             UE Noodle work x 4  2 2' 4' 3 2 2 2 2 2 2   UE AROM 4 4  1# 4' OB 4' 4' 4 4 4 4   Resistive UE work (TB) 2 2 2' blue 2' blk 2 2 2 2 2 2   IRVING 20x 20x 10x 10x x20 20x 20x 20x 20x 20x   Sit on noodle with movement             Seated on pool bench w proper posture   2 2 D/c seated ex  s        Ankle df/pf   1 1            1 1         Hip Ab/add   1 1         Knee flex/ext   1 1         Deep water mvmt 6 6 6 6 7 7 7 8 6 6   Deep water tx/stretching 10 10 10 10 10 10 10 8 10 10   Specific self - stretches wall/steps 2 3 3 3 2 2 2 2 2 2       Modalities  9/16 9/21 8/24 9/2 9/7 9/9 9/14   whirlpool 10 10 10 10 10 10 10 10 10 10

## 2022-09-23 ENCOUNTER — APPOINTMENT (OUTPATIENT)
Dept: PHYSICAL THERAPY | Age: 62
End: 2022-09-23
Payer: COMMERCIAL

## 2022-09-28 ENCOUNTER — APPOINTMENT (OUTPATIENT)
Dept: PHYSICAL THERAPY | Age: 62
End: 2022-09-28
Payer: COMMERCIAL

## 2022-10-14 ENCOUNTER — OFFICE VISIT (OUTPATIENT)
Dept: PAIN MEDICINE | Facility: CLINIC | Age: 62
End: 2022-10-14
Payer: COMMERCIAL

## 2022-10-14 VITALS
BODY MASS INDEX: 39.68 KG/M2 | DIASTOLIC BLOOD PRESSURE: 98 MMHG | SYSTOLIC BLOOD PRESSURE: 168 MMHG | WEIGHT: 293 LBS | HEIGHT: 72 IN | HEART RATE: 119 BPM

## 2022-10-14 DIAGNOSIS — M47.816 LUMBAR SPONDYLOSIS: ICD-10-CM

## 2022-10-14 DIAGNOSIS — Z98.890 S/P LUMBAR LAMINECTOMY: ICD-10-CM

## 2022-10-14 DIAGNOSIS — G89.29 CHRONIC BILATERAL LOW BACK PAIN WITHOUT SCIATICA: ICD-10-CM

## 2022-10-14 DIAGNOSIS — G89.4 CHRONIC PAIN SYNDROME: Primary | ICD-10-CM

## 2022-10-14 DIAGNOSIS — M54.50 CHRONIC BILATERAL LOW BACK PAIN WITHOUT SCIATICA: ICD-10-CM

## 2022-10-14 DIAGNOSIS — M79.18 MYOFASCIAL PAIN SYNDROME: ICD-10-CM

## 2022-10-14 PROCEDURE — 99213 OFFICE O/P EST LOW 20 MIN: CPT | Performed by: NURSE PRACTITIONER

## 2022-10-14 NOTE — PROGRESS NOTES
Assessment:  1  Chronic pain syndrome    2  Chronic bilateral low back pain without sciatica    3  S/P lumbar laminectomy    4  Lumbar spondylosis    5  Myofascial pain syndrome        Plan:  While the patient was in the office today, I did have a thorough conversation regarding their chronic pain syndrome, medication management, and treatment plan options  Patient is being seen for follow-up visit  She was last seen here on 09/16/2022  She tells me that she completed of full course of aqua therapy, but continues to go on her own weekly  She was trialed on Cymbalta  She has been taking 30 mg at bedtime  She tells me that she is experiencing daytime drowsiness, but no improvement  As such, we will discontinue Cymbalta  She continues with low back pain  She states that back pain seems to be little worse at night  She does have a prescription for Robaxin but uses it very rarely  I advised that she could possibly try taking the Robaxin at bedtime to reduce some of the nighttime pain  We discussed possibility of trigger point injections and medial branch blocks to determine if she is a candidate for radiofrequency ablation  I provided her with information regarding both procedures  The patient will follow-up in 1 month for medication prescription refill and reevaluation  The patient was advised to contact the office should their symptoms worsen in the interim  The patient was agreeable and verbalized an understanding  History of Present Illness: The patient is a 58 y o  female who presents for a follow up office visit in regards to Back Pain  The patient’s current symptoms include complaints of low back pain  She reports intermittent cramping in the left thigh  She states that the left leg has been numb since back surgery in the 1980s  Current pain level is a 4-5/10  Quality pain is described as sharp      Current pain medications includes:  Cymbalta daily, Robaxin on an as-needed basis    The patient reports that this regimen is providing 20 % pain relief  The patient is reporting sleepiness from this pain medication regimen  I have personally reviewed and/or updated the patient's past medical history, past surgical history, family history, social history, current medications, allergies, and vital signs today  Review of Systems  Review of Systems   Constitutional: Negative for unexpected weight change  HENT: Negative for hearing loss  Eyes: Negative for visual disturbance  Respiratory: Negative for shortness of breath  Cardiovascular: Negative for leg swelling  Gastrointestinal: Negative for constipation  Endocrine: Negative for polyuria  Genitourinary: Negative for difficulty urinating  Musculoskeletal: Positive for gait problem and myalgias  Negative for joint swelling  Decreased range of motion  Joint stiffness   Skin: Negative for rash  Neurological: Negative for weakness and headaches  Psychiatric/Behavioral: Negative for decreased concentration  All other systems reviewed and are negative          Past Medical History:   Diagnosis Date   • Cancer ([facility] [address] )     uterine   • Chronic midline low back pain    • Hypertension    • Morbid obesity ([facility] [address] )    • Parity 2    • Sleep apnea        Past Surgical History:   Procedure Laterality Date   • BACK SURGERY     • CHOLECYSTECTOMY     • COLONOSCOPY     • DILATION AND CURETTAGE OF UTERUS     • HYSTERECTOMY     • JOINT REPLACEMENT Bilateral     KNEES   • LAMINECTOMY     • LAPAROSCOPY     • MOUTH SURGERY     • MULTIPLE TOOTH EXTRACTIONS     • OOPHORECTOMY     • NE COLONOSCOPY FLX DX W/COLLJ SPEC WHEN PFRMD N/A 9/11/2017    Procedure: COLONOSCOPY;  Surgeon: Jonas Villanueva MD;  Location: [facility];  Service: Colorectal   • NE LAPAROSCOPY W TOT HYSTERECTUTERUS <=250 GRAM  W TUBE/OVARY N/A 10/5/2017    Procedure: ROBOTIC LAPAROSCOPIC HYSTERECTOMY; BILATERAL SALPINGO-OOPHERECTOMY, CYSTO;  Surgeon: Hiren Lynch MD;  Location: [facility];  Service: Gynecology Oncology   • REPLACEMENT TOTAL KNEE     • STOMACH SURGERY      gastric stapling   • TUBAL LIGATION         Family History   Problem Relation Age of Onset   • Endometrial cancer Mother    • No Known Problems Daughter    • No Known Problems Maternal Grandmother    • No Known Problems Maternal Grandfather    • No Known Problems Paternal Grandmother    • No Known Problems Paternal Grandfather    • No Known Problems Daughter    • No Known Problems Maternal Aunt    • No Known Problems Paternal Aunt    • Breast cancer Cousin        Social History     Occupational History   • Not on file   Tobacco Use   • Smoking status: Never Smoker   • Smokeless tobacco: Never Used   Substance and Sexual Activity   • Alcohol use: No   • Drug use: No   • Sexual activity: Never         Current Outpatient Medications:   •  aspirin (ECOTRIN LOW STRENGTH) 81 mg EC tablet, Take 81 mg by mouth daily, Disp: , Rfl:   •  metFORMIN (GLUCOPHAGE) 500 mg tablet, Take 500 mg by mouth 2 (two) times a day with meals, Disp: , Rfl:   •  Multiple Vitamin (MULTIVITAMIN) tablet, Take 1 tablet by mouth daily, Disp: , Rfl:   •  valsartan-hydrochlorothiazide (DIOVAN-HCT) 160-12 5 MG per tablet, Take 1 tablet by mouth daily, Disp: , Rfl:     Allergies   Allergen Reactions   • Peanut Oil - Food Allergy Anaphylaxis   • Treenut [Nuts - Food Allergy] Anaphylaxis       Physical Exam:    /98   Pulse (!) 119   Ht 6' (1 829 m)   Wt (!) 188 kg (415 lb)   BMI 56 28 kg/m²     Constitutional:normal, well developed, well nourished, alert, in no distress and non-toxic and no overt pain behavior   and obese  Eyes:anicteric  HEENT:grossly intact  Neck:supple, symmetric, trachea midline and no masses   Pulmonary:even and unlabored  Cardiovascular:No edema or pitting edema present  Skin:Normal without rashes or lesions and well hydrated  Psychiatric:Mood and affect appropriate  Neurologic:Cranial Nerves II-XII grossly intact  Musculoskeletal:normal    Imaging  No orders to display       No orders of the defined types were placed in this encounter

## 2022-11-02 ENCOUNTER — TELEPHONE (OUTPATIENT)
Dept: PAIN MEDICINE | Facility: MEDICAL CENTER | Age: 62
End: 2022-11-02

## 2022-11-02 NOTE — TELEPHONE ENCOUNTER
Caller: Elvia Childress    Doctor: Dr Radha Man     Reason for call: Patient calling stating medication is not working if she can move forward with TPI    Call back#: [phone]

## 2022-11-03 NOTE — TELEPHONE ENCOUNTER
Patient is scheduled for January 18th at 140pm   Offered her next Tuesday, but unable to take it  If there is a cancellation she would like to be called and possibly be put on the schedule

## 2022-12-01 ENCOUNTER — TELEPHONE (OUTPATIENT)
Dept: OBGYN CLINIC | Facility: CLINIC | Age: 62
End: 2022-12-01

## 2023-01-10 ENCOUNTER — TELEPHONE (OUTPATIENT)
Dept: PAIN MEDICINE | Facility: MEDICAL CENTER | Age: 63
End: 2023-01-10

## 2023-01-23 NOTE — TELEPHONE ENCOUNTER
I called and spoke with patient, she is starting with MATHIEU and would like to reschedule her USGI procedure for this Wednesday 1/25/23 until another date. Patient is requesting an afternoon appt. First available afternoon is 3/8/23. Patient is agreeable to that date. Patient was pleasant and appreciative of the call back.

## 2023-03-08 ENCOUNTER — PROCEDURE VISIT (OUTPATIENT)
Dept: PAIN MEDICINE | Facility: CLINIC | Age: 63
End: 2023-03-08

## 2023-03-08 DIAGNOSIS — M79.18 MYOFASCIAL PAIN SYNDROME: Primary | ICD-10-CM

## 2023-03-08 DIAGNOSIS — M54.50 CHRONIC BILATERAL LOW BACK PAIN WITHOUT SCIATICA: ICD-10-CM

## 2023-03-08 DIAGNOSIS — G89.29 CHRONIC BILATERAL LOW BACK PAIN WITHOUT SCIATICA: ICD-10-CM

## 2023-03-08 RX ORDER — BUPIVACAINE HYDROCHLORIDE 2.5 MG/ML
10 INJECTION, SOLUTION EPIDURAL; INFILTRATION; INTRACAUDAL
Status: COMPLETED | OUTPATIENT
Start: 2023-03-08 | End: 2023-03-08

## 2023-03-08 RX ORDER — TRIAMCINOLONE ACETONIDE 40 MG/ML
40 INJECTION, SUSPENSION INTRA-ARTICULAR; INTRAMUSCULAR
Status: COMPLETED | OUTPATIENT
Start: 2023-03-08 | End: 2023-03-08

## 2023-03-08 RX ADMIN — TRIAMCINOLONE ACETONIDE 40 MG: 40 INJECTION, SUSPENSION INTRA-ARTICULAR; INTRAMUSCULAR at 14:15

## 2023-03-08 RX ADMIN — BUPIVACAINE HYDROCHLORIDE 10 ML: 2.5 INJECTION, SOLUTION EPIDURAL; INFILTRATION; INTRACAUDAL at 14:15

## 2023-03-08 NOTE — PATIENT INSTRUCTIONS
Do not apply heat to any area that is numb  If you have discomfort or soreness at the injection site, you may apply ice today, 20 minutes on and 20 minutes off  Tomorrow you may use ice or warm, moist heat  Do not apply ice or heat directly to the skin  If you experience severe shortness of breath, go to the Emergency Room  You may have numbness for several hours from the local anesthetic  Please use caution and common sense, especially with weight-bearing activities  You may have an increase or change in the discomfort for 36-48 hours after your treatment  Apply ice and continue with any pain medicine you have been prescribed  Do not do anything strenuous today  You may shower, but no tub baths or hot tubs today  You may resume your normal activities tomorrow, but do not “overdo it”  Resume normal activities slowly when you are feeling better  If you experience redness, drainage or swelling at the injection site, or if you develop a fever above 100 degrees, please call [facility] at [phone] or go to the Emergency Room  Continue to take all routine medicines prescribed by your primary care physician unless otherwise instructed by our staff  Most blood thinners should be started again according to your regularly scheduled dosing  If you have any questions, please give our office a call  As no general anesthesia was used in today's procedure, you should not experience any side effects related to anesthesia  If you have a problem specifically related to your procedure, please call our office at [phone]  Problems not related to your procedure should be directed to your primary care physician

## 2023-03-08 NOTE — PROGRESS NOTES
Universal Protocol:  Consent: Verbal consent obtained  Written consent obtained  Risks and benefits: risks, benefits and alternatives were discussed  Consent given by: patient  Time out: Immediately prior to procedure a "time out" was called to verify the correct patient, procedure, equipment, support staff and site/side marked as required  Timeout called at: 3/8/2023 2:39 PM   Patient understanding: patient states understanding of the procedure being performed  Patient consent: the patient's understanding of the procedure matches consent given  Procedure consent: procedure consent matches procedure scheduled  Relevant documents: relevant documents present and verified  Test results: test results available and properly labeled  Site marked: the operative site was marked  Radiology Images displayed and confirmed   If images not available, report reviewed: imaging studies not available  Required items: required blood products, implants, devices, and special equipment available  Patient identity confirmed: verbally with patient    Supporting Documentation  Indications: pain   Trigger Point Injections: multiple trigger points: 3 or more muscle groups    Injection site identified by: ultrasound    Procedure Details  Location(s):    Lower Back: L lumbar paraspinals, L quadratus lumborum, L iliocostalis lumborum, R iliocostalis lumborum, R lumbar paraspinals and R quadratus lumborum     Prep: patient was prepped and draped in usual sterile fashion  Needle size: 22 G  Medications: 10 mL bupivacaine (PF) 0 25 %; 40 mg triamcinolone acetonide 40 mg/mL  Patient tolerance: patient tolerated the procedure well with no immediate complications

## 2023-03-15 ENCOUNTER — TELEPHONE (OUTPATIENT)
Dept: PAIN MEDICINE | Facility: CLINIC | Age: 63
End: 2023-03-15

## 2023-04-27 ENCOUNTER — CONSULT (OUTPATIENT)
Dept: NEPHROLOGY | Facility: CLINIC | Age: 63
End: 2023-04-27

## 2023-04-27 VITALS
OXYGEN SATURATION: 99 % | HEART RATE: 105 BPM | DIASTOLIC BLOOD PRESSURE: 72 MMHG | BODY MASS INDEX: 39.68 KG/M2 | WEIGHT: 293 LBS | SYSTOLIC BLOOD PRESSURE: 118 MMHG | HEIGHT: 72 IN

## 2023-04-27 DIAGNOSIS — E66.01 CLASS 3 SEVERE OBESITY DUE TO EXCESS CALORIES WITH BODY MASS INDEX (BMI) OF 50.0 TO 59.9 IN ADULT, UNSPECIFIED WHETHER SERIOUS COMORBIDITY PRESENT (HCC): ICD-10-CM

## 2023-04-27 DIAGNOSIS — E83.52 HYPERCALCEMIA: ICD-10-CM

## 2023-04-27 DIAGNOSIS — E11.69 TYPE 2 DIABETES MELLITUS WITH OTHER SPECIFIED COMPLICATION, UNSPECIFIED WHETHER LONG TERM INSULIN USE (HCC): ICD-10-CM

## 2023-04-27 DIAGNOSIS — I10 ESSENTIAL HYPERTENSION: ICD-10-CM

## 2023-04-27 DIAGNOSIS — N18.2 STAGE 2 CHRONIC KIDNEY DISEASE: Primary | ICD-10-CM

## 2023-04-27 NOTE — PATIENT INSTRUCTIONS
Sick day protocol: If you were to get sick or not eating or have vomiting/diarrhea, hold the valsartan hydrochlorothiazide  You have very mild kidney weakness and are borderline between stage II and stage IIIa  This is likely due to getting older, hypertension, obesity  Avoid long-term use of anti-inflammatories  Your most recent kidney function is 64%  Target increasing fluid intake 8-16 ounces of water  Should overall be drinking closer to 50-60 ounces per day  Follow up in 4 months  Check blood/urine testing prior to visit  Check kidney ultrasond

## 2023-04-27 NOTE — PROGRESS NOTES
[facility]  Consultation - Nephrology    Jonathon Porras 61 y o  female MRN: [id number]      A/P:    1  Stage 2 chronic kidney disease  -     US kidney and bladder; Future; Expected date: 06/06/2023  -     PTH, intact; Future; Expected date: 06/06/2023  -     Protein electrophoresis, serum; Future; Expected date: 06/06/2023  -     Vitamin D 25 hydroxy; Future; Expected date: 06/06/2023  -     Comprehensive metabolic panel; Future; Expected date: 06/06/2023  -     Urinalysis with microscopic; Future; Expected date: 06/06/2023  -     Microalbumin / creatinine urine ratio; Future; Expected date: 06/06/2023  -     Protein electrophoresis, urine; Future; Expected date: 06/06/2023  -     Protein / creatinine ratio, urine; Future; Expected date: 06/06/2023  -     Phosphorus; Future; Expected date: 06/06/2023  -     Uric acid; Future; Expected date: 06/06/2023    2  Type 2 diabetes mellitus with other specified complication, unspecified whether long term insulin use ([facility] )    3  Essential hypertension    4  Class 3 severe obesity due to excess calories with body mass index (BMI) of 50 0 to 59 9 in adult, unspecified whether serious comorbidity present ([facility] )    5  Hypercalcemia         I have reviewed pertinent labs and imaging with patient  1  CKD2/3a A1  Appears euvolemic at this time   -Baseline Cr: 0 9-1 0    -Etiology: obesity related, HTN, DM  Not biopsy proven  She does have family hx renal disease  -BP: goal <130/80  Under ideal control at present   -Electrolyte/acid-base: Cl 105, TCO2 27 WNL  -K 4 0 WNL  -sodium; 136 WNL  -CKD MBD:Ca 10 2, mildly elevated, check PTH   -hgb 14 range, no need for anemia workup    Reviewed CKD stages/ Cr and eGFR trends  Check renal US for baseline  Check hypercalcemia workup-pth,phos,spep/upep  Can hold on further workup without any significant microalbuminuria  check UA  Follow up in 4 months   Check CMP/PTH/uric acid/UA/urine protein/microalbmin /cr ratio  Increase hydration by 8-16 ounces of water  Need to target closer to 50-60 ounces per day  2  Type 2 DM   Goal A1C ~7  A1C 5 2 on 4/11/23  Continue metformin and ozempic per PCP  3  Essential HTN  bp under excellent control goal <130/80  Continue diovan-hct 160-12 5mg/day  No need to add additional antihypertensive at this time  4  Class 3 obesity  Sp gastric surgery 1980s  Started weight watcher in Feb and dropped 25 lb since then  Dropped 70 lb in 2 years  Continue ozempic per PCP  5  Hypercalcemia, suspect volume contracted  Repeat chemistry, phos, PTH, Spep/upep  Drinking 1 quart per day  Should be drinking 2 quart per day  Increase hydration 8-16 ounce per day  Potentially due to PRIMARY hyperparathyroidism which is unmasked by HCTZ  Repeat chemistry  No need to stop HCTZ as of yet  The patient and any family members present were counseled today on risks benefits and alternatives of any medications, and were agreeable to the treatment plan  They were counseled on diagnostic testing if necessary, and projected outcomes as are available and medically indicated  All questions were asked and answered during the encounter  If more questions are to arise the patient will call the office  Alarm and return precautions discussed and accepted  Thank you for allowing us to participate in the care of your patient  History of Present Illness   Physician Requesting Consult: No att  providers found    HPI: Mamadou Barone is a 61y o  year old female who presents for CKD evaluation at discretion of  PCP  Denies following with nephrologist in the past  Cr 0 95 on 4/11/23  Cr baseline 0 9-1 0  Calcium mildly elevated 10 2 and had been upper limit of normal over the past year  Patient has a family hx renal disease in aunt and grandmother  Grandmother had tumor in her kidneys , related to librium  Her aunt had DM       Reports hx htn x 20 years  Does not monitor at home  /72  Taking valsartn-HCTZ  Started weight watcher in Feb and dropped 25 lb since then  Dropped 70 lb in 2 years  Occasional leg swelling  Only drinking 32 ounces of fluid per day  Takes tylenol for pain  Reports previous naproxen use, stopped last year  She was on a short course of toradol for foot pain in Nov-December  Uses voltaren gel occasionally  Takes metformin for DM ~ 1 year  On ozempic for 1 year  She has a hx endometrial cancer in 2017 and had hysterectomy  No other treatments post      Reports pyelonephritis 4-5 years ago  No UTI since several years ago  No gross hematuria  Denies proteinuria  Headaches for last 1 week since her route canal 1 week ago  Improves with tylenol  Had gastric bypass done in the 1980s  Follows with pain management, orthopedic for carpal tunnel, PCP  She had preclampsia with both pregnancies--89, 89  She has 2 daughters, no renal issues  She had questionable hx hep C  Constitutional ROS- + weight loss intentionally   HEENT ROS-+ headache for 1 week  Endocrine ROS-+ DM   Cardiovascular ROS- Denies chest pain, palpitation, dyspnea exertion, orthopnea, claudication  Pulmonary ROS-  Denies cough, hemoptysis, shortness of breath  GI ROS- Denies abdominal pain, diarrhea, nausea, swallowing problems, vomiting, constipation, blood in stools, fecal incontinence  Hematological ROS- superficial phlebitis , albumin inufusion in 1980s  Genitourinary ROS-+ pyelonephritis   Lymphatic ROS- Denies lymphadenopathy  Musculoskeletal ROS- back pain, knee pain   Dermatological ROS- Denies rash, wounds, ulcers, itching, jaundice  Psychiatric ROS- Denies hallucinations, disorientation  Neurological ROS- No stroke or TIA symptoms       Historical Information   Past Medical History:   Diagnosis Date   • Cancer [facility])     uterine   • Chronic midline low back pain    • Hypertension    • Morbid obesity ([facility] [address] )    • Parity 2    • Sleep apnea      Past Surgical History:   Procedure Laterality Date   • BACK SURGERY     • CHOLECYSTECTOMY     • COLONOSCOPY     • DILATION AND CURETTAGE OF UTERUS     • HYSTERECTOMY     • JOINT REPLACEMENT Bilateral     KNEES   • LAMINECTOMY     • LAPAROSCOPY     • MOUTH SURGERY     • MULTIPLE TOOTH EXTRACTIONS     • OOPHORECTOMY     • [place] COLONOSCOPY FLX DX W/COLLJ SPEC WHEN PFRMD N/A 9/11/2017    Procedure: COLONOSCOPY;  Surgeon: Vitaly Uribe MD;  Location: [facility];  Service: Colorectal   • [place] LAPS TOTAL HYSTERECT 250 GM/< W/RMVL TUBE/OVARY N/A 10/5/2017    Procedure: ROBOTIC LAPAROSCOPIC HYSTERECTOMY; BILATERAL SALPINGO-OOPHERECTOMY, CYSTO;  Surgeon: John Lynn MD;  Location:  MAIN OR;  Service: Gynecology Oncology   • REPLACEMENT TOTAL KNEE     • STOMACH SURGERY      gastric stapling   • TUBAL LIGATION       Social History   Social History     Substance and Sexual Activity   Alcohol Use No     Social History     Substance and Sexual Activity   Drug Use No     Social History     Tobacco Use   Smoking Status Never   Smokeless Tobacco Never     Family History   Problem Relation Age of Onset   • Endometrial cancer Mother    • No Known Problems Daughter    • No Known Problems Maternal Grandmother    • No Known Problems Maternal Grandfather    • No Known Problems Paternal Grandmother    • No Known Problems Paternal Grandfather    • No Known Problems Daughter    • No Known Problems Maternal Aunt    • No Known Problems Paternal Aunt    • Breast cancer Cousin        Meds/Allergies   all current active meds have been reviewed, current meds:     Current Outpatient Medications:   •  aspirin (ECOTRIN LOW STRENGTH) 81 mg EC tablet, Take 81 mg by mouth daily, Disp: , Rfl:   •  Cholecalciferol 10 MCG (400 UNIT) CHEW, Take by mouth , Disp: , Rfl:   •  Diclofenac Sodium (VOLTAREN) 1 %, Apply topically every 6 (six) hours, Disp: , Rfl:   •  metFORMIN (GLUCOPHAGE) 500 mg tablet, Take 500 mg by mouth 2 (two) times a day with meals, Disp: , Rfl:   •  methocarbamol (ROBAXIN) 500 mg tablet, Take 500 mg by mouth 4 (four) times a day as needed, Disp: , Rfl:   •  Multiple Vitamin (MULTIVITAMIN) tablet, Take 1 tablet by mouth daily, Disp: , Rfl:   •  multivitamin (THERAGRAN) TABS, Take 1 tablet by mouth daily, Disp: , Rfl:   •  Pain Relief Extra Strength 500 MG tablet, take 2 tablets by mouth every 8 hours for 10 days, Disp: , Rfl:   •  semaglutide, 0 25 or 0 5 mg/dose, (Ozempic) 2 mg/1 5 mL injection pen, INJECT 0 5 MG UNDER THE SKIN WEEKLY, Disp: , Rfl:   •  traMADol (ULTRAM) 50 mg tablet, Take 50 mg by mouth every 6 (six) hours as needed, Disp: , Rfl:   •  valsartan-hydrochlorothiazide (DIOVAN-HCT) 160-12 5 MG per tablet, Take 1 tablet by mouth daily, Disp: , Rfl:   •  WHEAT DEXTRIN-VIT B6-B12-FA PO, Take by mouth, Disp: , Rfl:      Allergies   Allergen Reactions   • Peanut Oil - Food Allergy Anaphylaxis   • Treenut [Nuts - Food Allergy] Anaphylaxis       Objective     Vitals:    04/27/23 0910   BP: 118/72   Pulse: 105   SpO2: 99%       General Appearance:    No acute distress  Cooperative  Obesity    Head:    Normocephalic  Atraumatic  Eyes:    Lids, conjunctiva normal  No scleral icterus  Ears:    Normal external ears  Nose:   Nares normal  No drainage  Mouth:   Lips, tongue normal  Mucosa normal     Neck:    Back:     Symmetric  No CVA tenderness  Lungs:     Normal respiratory effort  Clear to auscultation bilaterally  Chest wall:    No tenderness or deformity  Heart:    Regular rate and rhythm  Normal S1 and S2  No murmur  No JVD  No edema  Abdomen:     Soft  Non-tender  Bowel sounds active  Genitourinary:   No Quiñones catheter present  Extremities:   Extremities normal  Atraumatic  No cyanosis  Skin:   Chronic venous stasis changes BL LE    Neurologic:   Alert and oriented to person, place, time  No focal deficit    Ambulate with cane          Current Weight: Weight - Scale: (!) 175 kg (385 lb)    First Weight:    Wt Readings from Last 3 Encounters:   04/27/23 (!) 175 kg (385 lb)   10/14/22 (!) 188 kg (415 lb)   09/16/22 (!) 188 kg (415 lb)        Lab Results:  I have personally reviewed pertinent labs  4/11/23  Na 136 K 4 0 cl 105 tco2 27 bun 30 cr 0 95 Ca 10 2 eGFR 64 ml/min      Radiology review:  No results found  Markel Guaman, DO      This consultation note was produced in part using a dictation device which may document imprecise wording from author's original intent

## 2023-06-06 ENCOUNTER — APPOINTMENT (OUTPATIENT)
Age: 63
End: 2023-06-06
Payer: COMMERCIAL

## 2023-06-06 DIAGNOSIS — N18.2 STAGE 2 CHRONIC KIDNEY DISEASE: ICD-10-CM

## 2023-06-06 LAB
25(OH)D3 SERPL-MCNC: 38.3 NG/ML (ref 30–100)
ALBUMIN SERPL BCP-MCNC: 3.7 G/DL (ref 3.5–5)
ALP SERPL-CCNC: 106 U/L (ref 46–116)
ALT SERPL W P-5'-P-CCNC: 27 U/L (ref 12–78)
ANION GAP SERPL CALCULATED.3IONS-SCNC: 4 MMOL/L (ref 4–13)
AST SERPL W P-5'-P-CCNC: 16 U/L (ref 5–45)
BACTERIA UR QL AUTO: ABNORMAL /HPF
BILIRUB SERPL-MCNC: 0.8 MG/DL (ref 0.2–1)
BILIRUB UR QL STRIP: NEGATIVE
BUN SERPL-MCNC: 24 MG/DL (ref 5–25)
CALCIUM SERPL-MCNC: 10 MG/DL (ref 8.3–10.1)
CHLORIDE SERPL-SCNC: 104 MMOL/L (ref 96–108)
CLARITY UR: ABNORMAL
CO2 SERPL-SCNC: 27 MMOL/L (ref 21–32)
COLOR UR: YELLOW
CREAT SERPL-MCNC: 1 MG/DL (ref 0.6–1.3)
CREAT UR-MCNC: 193 MG/DL
CREAT UR-MCNC: 193 MG/DL
GFR SERPL CREATININE-BSD FRML MDRD: 60 ML/MIN/1.73SQ M
GLUCOSE P FAST SERPL-MCNC: 112 MG/DL (ref 65–99)
GLUCOSE UR STRIP-MCNC: NEGATIVE MG/DL
HGB UR QL STRIP.AUTO: NEGATIVE
KETONES UR STRIP-MCNC: NEGATIVE MG/DL
LEUKOCYTE ESTERASE UR QL STRIP: ABNORMAL
MICROALBUMIN UR-MCNC: 11.4 MG/L (ref 0–20)
MICROALBUMIN/CREAT 24H UR: 6 MG/G CREATININE (ref 0–30)
MUCOUS THREADS UR QL AUTO: ABNORMAL
NITRITE UR QL STRIP: NEGATIVE
NON-SQ EPI CELLS URNS QL MICRO: ABNORMAL /HPF
PH UR STRIP.AUTO: 5.5 [PH]
PHOSPHATE SERPL-MCNC: 3 MG/DL (ref 2.3–4.1)
POTASSIUM SERPL-SCNC: 3.8 MMOL/L (ref 3.5–5.3)
PROT SERPL-MCNC: 8.1 G/DL (ref 6.4–8.4)
PROT UR STRIP-MCNC: ABNORMAL MG/DL
PROT UR-MCNC: 9 MG/DL
PROT/CREAT UR: 0.05 MG/G{CREAT} (ref 0–0.1)
PTH-INTACT SERPL-MCNC: 68.7 PG/ML (ref 12–88)
RBC #/AREA URNS AUTO: ABNORMAL /HPF
SODIUM SERPL-SCNC: 135 MMOL/L (ref 135–147)
SP GR UR STRIP.AUTO: 1.02 (ref 1–1.03)
URATE SERPL-MCNC: 8.7 MG/DL (ref 2–7.5)
UROBILINOGEN UR STRIP-ACNC: <2 MG/DL
WBC #/AREA URNS AUTO: ABNORMAL /HPF

## 2023-06-06 PROCEDURE — 84550 ASSAY OF BLOOD/URIC ACID: CPT

## 2023-06-06 PROCEDURE — 80053 COMPREHEN METABOLIC PANEL: CPT

## 2023-06-06 PROCEDURE — 84100 ASSAY OF PHOSPHORUS: CPT

## 2023-06-06 PROCEDURE — 84165 PROTEIN E-PHORESIS SERUM: CPT

## 2023-06-06 PROCEDURE — 82043 UR ALBUMIN QUANTITATIVE: CPT

## 2023-06-06 PROCEDURE — 84156 ASSAY OF PROTEIN URINE: CPT

## 2023-06-06 PROCEDURE — 36415 COLL VENOUS BLD VENIPUNCTURE: CPT

## 2023-06-06 PROCEDURE — 82306 VITAMIN D 25 HYDROXY: CPT

## 2023-06-06 PROCEDURE — 81001 URINALYSIS AUTO W/SCOPE: CPT

## 2023-06-06 PROCEDURE — 83970 ASSAY OF PARATHORMONE: CPT

## 2023-06-06 PROCEDURE — 84166 PROTEIN E-PHORESIS/URINE/CSF: CPT

## 2023-06-06 PROCEDURE — 82570 ASSAY OF URINE CREATININE: CPT

## 2023-06-07 LAB
ALBUMIN SERPL ELPH-MCNC: 4.07 G/DL (ref 3.2–5.1)
ALBUMIN SERPL ELPH-MCNC: 53.5 % (ref 48–70)
ALPHA1 GLOB SERPL ELPH-MCNC: 0.32 G/DL (ref 0.15–0.47)
ALPHA1 GLOB SERPL ELPH-MCNC: 4.2 % (ref 1.8–7)
ALPHA2 GLOB SERPL ELPH-MCNC: 0.88 G/DL (ref 0.42–1.04)
ALPHA2 GLOB SERPL ELPH-MCNC: 11.6 % (ref 5.9–14.9)
BETA GLOB ABNORMAL SERPL ELPH-MCNC: 0.46 G/DL (ref 0.31–0.57)
BETA1 GLOB SERPL ELPH-MCNC: 6.1 % (ref 4.7–7.7)
BETA2 GLOB SERPL ELPH-MCNC: 7.4 % (ref 3.1–7.9)
BETA2+GAMMA GLOB SERPL ELPH-MCNC: 0.56 G/DL (ref 0.2–0.58)
GAMMA GLOB ABNORMAL SERPL ELPH-MCNC: 1.31 G/DL (ref 0.4–1.66)
GAMMA GLOB SERPL ELPH-MCNC: 17.2 % (ref 6.9–22.3)
IGG/ALB SER: 1.15 {RATIO} (ref 1.1–1.8)
PROT PATTERN SERPL ELPH-IMP: NORMAL
PROT SERPL-MCNC: 7.6 G/DL (ref 6.4–8.2)

## 2023-06-08 PROCEDURE — 84165 PROTEIN E-PHORESIS SERUM: CPT | Performed by: PATHOLOGY

## 2023-06-09 LAB
ALBUMIN UR ELPH-MCNC: 100 %
ALPHA1 GLOB MFR UR ELPH: 0 %
ALPHA2 GLOB MFR UR ELPH: 0 %
B-GLOBULIN MFR UR ELPH: 0 %
GAMMA GLOB MFR UR ELPH: 0 %
PROT PATTERN UR ELPH-IMP: NORMAL
PROT UR-MCNC: 9 MG/DL

## 2023-06-09 PROCEDURE — 84166 PROTEIN E-PHORESIS/URINE/CSF: CPT | Performed by: STUDENT IN AN ORGANIZED HEALTH CARE EDUCATION/TRAINING PROGRAM

## 2023-06-20 ENCOUNTER — HOSPITAL ENCOUNTER (OUTPATIENT)
Dept: ULTRASOUND IMAGING | Facility: HOSPITAL | Age: 63
Discharge: HOME | End: 2023-06-20
Attending: INTERNAL MEDICINE
Payer: COMMERCIAL

## 2023-06-20 DIAGNOSIS — N18.2 STAGE 2 CHRONIC KIDNEY DISEASE: ICD-10-CM

## 2023-06-20 PROCEDURE — 76775 US EXAM ABDO BACK WALL LIM: CPT

## 2023-07-03 ENCOUNTER — TELEPHONE (OUTPATIENT)
Dept: NEPHROLOGY | Facility: CLINIC | Age: 63
End: 2023-07-03

## 2023-07-03 DIAGNOSIS — N39.0 URINARY TRACT INFECTION WITHOUT HEMATURIA, SITE UNSPECIFIED: Primary | ICD-10-CM

## 2023-07-03 RX ORDER — SULFAMETHOXAZOLE AND TRIMETHOPRIM 800; 160 MG/1; MG/1
1 TABLET ORAL EVERY 12 HOURS SCHEDULED
Qty: 10 TABLET | Refills: 0 | Status: SHIPPED | OUTPATIENT
Start: 2023-07-03 | End: 2023-07-08

## 2023-07-03 NOTE — TELEPHONE ENCOUNTER
Pt called, states she now has symptoms of UTI, burning upon urination/frequency/urgency. Pt would like script to be sent to [facility] on [address] in [facility] if needed.

## 2023-07-10 ENCOUNTER — TELEPHONE (OUTPATIENT)
Dept: FAMILY MEDICINE CLINIC | Facility: CLINIC | Age: 63
End: 2023-07-10

## 2023-07-10 NOTE — TELEPHONE ENCOUNTER
Patient callled to ask if you would consider placing an order for her to continue Aqua Therapy that was originally ordered by Dr Jon Salgado ( I suggested she contact him about that, but she wanted to know why Alondra Sierra couldn't just re-order that. Also she had a fall and went to the E.R. Said her knee gave out. E.R. suggested she contact her PCP to see if she could have an order for strengthening exercises through P.T.   Patient left her number in case you wanted to talk to her  [phone]

## 2023-07-12 ENCOUNTER — TELEPHONE (OUTPATIENT)
Dept: FAMILY MEDICINE CLINIC | Facility: CLINIC | Age: 63
End: 2023-07-12

## 2023-07-12 NOTE — TELEPHONE ENCOUNTER
LEFT PATIENT A VM TO CALL OFFICE BACK AND LET US KNOW WHERE SHE WENT FOR AQUA THERAPY AND WHICH E.R. SHE WENT TO FOR THE KNEE PAIN SO WE COULD CALL AND GET A REPORT

## 2023-07-13 DIAGNOSIS — M54.16 LUMBAR RADICULOPATHY: ICD-10-CM

## 2023-07-13 DIAGNOSIS — M79.18 MYOFASCIAL PAIN SYNDROME: Primary | ICD-10-CM

## 2023-07-17 ENCOUNTER — TELEPHONE (OUTPATIENT)
Dept: FAMILY MEDICINE CLINIC | Facility: CLINIC | Age: 63
End: 2023-07-17

## 2023-07-17 DIAGNOSIS — M25.562 ACUTE PAIN OF BOTH KNEES: Primary | ICD-10-CM

## 2023-07-17 DIAGNOSIS — M25.561 ACUTE PAIN OF BOTH KNEES: Primary | ICD-10-CM

## 2023-07-17 DIAGNOSIS — R53.1 WEAKNESS: ICD-10-CM

## 2023-07-17 NOTE — TELEPHONE ENCOUNTER
CALLED AND TOLD PT ABOUT THE AQUA THERAPY ORDER.   SHE IS ADAMANT ABOUT HAVING A [facility] PHYSICIAN PUT THE ORDER IN FOR HER TO HAVE PT FOR BILATERAL KNEE STRENGTHENING THERA;PY   PLEASE

## 2023-07-17 NOTE — TELEPHONE ENCOUNTER
LEFT A VM FOR PATIENT THAT THE ORDER FOR PT FOR BILATERAL KNEES WAS PLACED IN HER CHART.   TOLD HER TO CALL US IF THERE WERE ANY QUESTIONS

## 2023-07-18 LAB
LEFT EYE DIABETIC RETINOPATHY: NORMAL
RIGHT EYE DIABETIC RETINOPATHY: NORMAL

## 2023-07-26 ENCOUNTER — OFFICE VISIT (OUTPATIENT)
Dept: NEPHROLOGY | Facility: CLINIC | Age: 63
End: 2023-07-26

## 2023-07-26 VITALS
HEIGHT: 72 IN | BODY MASS INDEX: 39.68 KG/M2 | WEIGHT: 293 LBS | OXYGEN SATURATION: 98 % | DIASTOLIC BLOOD PRESSURE: 70 MMHG | HEART RATE: 123 BPM | SYSTOLIC BLOOD PRESSURE: 142 MMHG

## 2023-07-26 DIAGNOSIS — M10.9 GOUT: ICD-10-CM

## 2023-07-26 DIAGNOSIS — I10 ESSENTIAL HYPERTENSION: ICD-10-CM

## 2023-07-26 DIAGNOSIS — E83.52 HYPERCALCEMIA: ICD-10-CM

## 2023-07-26 DIAGNOSIS — E11.69 TYPE 2 DIABETES MELLITUS WITH OTHER SPECIFIED COMPLICATION, UNSPECIFIED WHETHER LONG TERM INSULIN USE (HCC): ICD-10-CM

## 2023-07-26 DIAGNOSIS — N18.2 STAGE 2 CHRONIC KIDNEY DISEASE: Primary | ICD-10-CM

## 2023-07-26 NOTE — PROGRESS NOTES
Assessment & Plan:    1. Stage 2 chronic kidney disease  -     Urinalysis with microscopic; Future; Expected date: 12/20/2023  -     PTH, intact; Future; Expected date: 12/20/2023  -     Uric acid; Future; Expected date: 12/20/2023  -     Albumin / creatinine urine ratio; Future; Expected date: 12/20/2023  -     Magnesium; Future; Expected date: 12/20/2023  -     Comprehensive metabolic panel; Future; Expected date: 12/20/2023  -     Phosphorus; Future; Expected date: 12/20/2023  -     Uric acid; Future; Expected date: 08/02/2023    2. Essential hypertension    3. Hypercalcemia    4. Gout    5. Type 2 diabetes mellitus with other specified complication, unspecified whether long term insulin use ([address])         1. CKD 2/3a  Albumin/creatinine ratio within normal limits  Metabolic parameters stable. Volume status appears euvolemic    Etiologies of renal disease may include obesity related, hypertensive nephrosclerosis, prerenal effect of hydrochlorothiazide because of vasomotor effect of valsartan, diabetes. Reviewed CKD stages, creatinine and EGFR trends. Baseline appears to be in the 0.9-1.0 range. Encourage weight loss. We will focus on treatment of gout which can help prevent CKD progression. Goal uric acid less than 6.0. Goal A1c less than 7.0. A1c 5.2 on 4/11. Continue Ozempic and metformin per primary. Goal blood pressure less than 130/80. Currently maintained on valsartan/hydrochlorothiazide. Her blood pressure trends are reasonable. No need to titrate. Recommend to recheck uric acid in 1 week. If uric acid elevated we will consider starting allopurinol 50 mg/day. If the pain is worsening despite NSAID use can call office for evaluation of x-ray. If she does have a flare she should contact the office and would advise indomethacin. Hydrate to 64 ounces. Reviewed renal ultrasound with extrarenal pelvis. No concerns for hydronephrosis or infective uropathy. Denies LUTS.     2. Essential hypertension  Blood pressure appears under reasonable control  Continue valsartan/hydrochlorothiazide. I did discuss that hydrochlorothiazide could predispose to gout and unmask primary hyperparathyroidism. 3.  Hypercalcemia  Potentially due to volume contraction. PTH was 68. 7. If her calcium was trending up this may be inappropriately normal.  Hydrochlorothiazide can unmask primary hyperparathyroidism. We will follow her PTH trends and decide if further evaluation is warranted. Last calcium 10.0 uncorrected. Calcium is only mildly elevated at 10.2 on 4/11. Calcium trends have normalized. She did increase her hydration. SPEP, UPEP was negative. 25 vitamin D level is normal.    4.  Gout with concerns for podagra on right foot. Uric acid noted to be elevated at 8.7 on 6/6. She is 1 week out from her flare. She has been taking meloxicam with improvement. .  She showed me pictures of her foot 1 week prior and her redness is significantly improved. Recommend to recheck uric acid 2 weeks out from flare. Given concerns for gout and she is definitely at risk with obesity, diabetes, hydrochlorothiazide use, would recommend treating with allopurinol 50 mg/day. We can consider this colchicine as well. If her pain worsens despite meloxicam, she will contact the office  We can obtain it foot x-ray as well if there are concerns for lack of improvement. 5.  Type 2 diabetes  Tolerating metformin and Ozempic. Continue management per primary. Encourage weight loss. Goal A1c less than 7    The benefits, risks and alternatives to the treatment plan were discussed at this visit. Patient was advised of common adverse effects of any medical therapies prescribed. A  l questions were answered and discussed with the patient and any accompanying family members or caretakers. Subjective:      Patient ID: Lilliana Sanchez is a 61 y.o. female presenting for follow-up in the [facility] office for CKD 2. Patient has a history of type 2 diabetes, essential hypertension, hypercalcemia. She was initially seen on consult on 4/27. Work-up for hypercalcemia was started. She was only drinking 1 quart per day. Advised to increase her hydration. She was on hydrochlorothiazide which could unmask primary hyperparathyroidism. HPI  In interim since last visit, she had a fall and mechanical fall in July after using exercise equipment. Her knee ended up giving out on her. This may have been due to fatigue of her muscles. She saw physical therapy in the hospital.    She has been bringing intermittent concerns for toe/metatarsal pain which may be attributed to gout. She reports significant pain on her right great toe. There was erythema 1 week ago which has subsided to bruising. She denied trauma to the area. She cannot even put her shoe on as the pain was so significant. She did not have a joint aspirate for a did previously. She does not carry an official diagnosis of gout. However, she is obese and she is diabetic. She is also on hydrochlorothiazide. She started taking meloxicam once a day and she reported relief with this. She reports she went today. Appears her erythema is much improved. She had flare start July 22nd. Her uric acid was 8.7 on 6/6. She does eat seafood but denies alcohol use. Does not monitor BP at home. Taking valsartan-hctz. Does not monitor BG at home. Taking ozempic and metforimin. Drinking at least 48 ounces. Going to have cataract evaluation in October. Going to have PT evaluation. The following portions of the patient's history were reviewed and updated as appropriate: allergies, current medications, past family history, past medical history, past social history, past surgical history, and problem list.    Review of Systems   Respiratory: Negative. Cardiovascular: Negative. Gastrointestinal: Negative. Genitourinary: Negative.     Neurological: Negative. All other systems reviewed and are negative. Objective:      /70   Pulse (!) 123   Ht 6' (1.829 m)   Wt (!) 164 kg (362 lb)   SpO2 98%   BMI 49.10 kg/m²          Physical Exam  Vitals reviewed. Constitutional:       General: She is not in acute distress. HENT:      Head: Normocephalic. Nose: Nose normal.      Mouth/Throat:      Mouth: Mucous membranes are moist.   Eyes:      Extraocular Movements: Extraocular movements intact. Conjunctiva/sclera: Conjunctivae normal.   Cardiovascular:      Rate and Rhythm: Normal rate and regular rhythm. Pulmonary:      Breath sounds: No wheezing, rhonchi or rales. Abdominal:      Tenderness: There is no abdominal tenderness. There is no guarding. Musculoskeletal:      Right lower leg: No edema. Left lower leg: No edema. Skin:     Coloration: Skin is not jaundiced. Findings: No bruising. Comments: Chronic venous stasis changes BL   Bruising first right metatarsal    Neurological:      General: No focal deficit present. Mental Status: She is alert and oriented to person, place, and time. Mental status is at baseline. Cranial Nerves: No cranial nerve deficit.       Gait: Gait abnormal.   Psychiatric:         Mood and Affect: Mood normal.         Behavior: Behavior normal.             Lab Results   Component Value Date    SODIUM 135 06/06/2023    K 3.8 06/06/2023     06/06/2023    CO2 27 06/06/2023    AGAP 4 06/06/2023    BUN 24 06/06/2023    CREATININE 1.00 06/06/2023    GLUC 104 06/28/2022    GLUF 112 (H) 06/06/2023    CALCIUM 10.0 06/06/2023    AST 16 06/06/2023    ALT 27 06/06/2023    ALKPHOS 106 06/06/2023    TP 7.6 06/06/2023    TP 8.1 06/06/2023    TBILI 0.80 06/06/2023    EGFR 60 06/06/2023      Lab Results   Component Value Date    CREATININE 1.00 06/06/2023    CREATININE 0.95 04/11/2023    CREATININE 1.03 06/28/2022    CREATININE 1.03 03/22/2022    CREATININE 1.02 07/24/2021    CREATININE 0.76 09/15/2020    CREATININE 0.78 03/05/2020    CREATININE 0.84 10/23/2019    CREATININE 0.90 04/24/2019    CREATININE 0.72 11/29/2018    CREATININE 0.90 09/21/2017      Lab Results   Component Value Date    COLORU Yellow 06/06/2023    CLARITYU Hazy 06/06/2023    SPECGRAV 1.025 06/06/2023    PHUR 5.5 06/06/2023    LEUKOCYTESUR Large (A) 06/06/2023    NITRITE Negative 06/06/2023    PROTEIN UA Trace (A) 06/06/2023    GLUCOSEU Negative 06/06/2023    KETONESU Negative 06/06/2023    UROBILINOGEN <2.0 06/06/2023    BILIRUBINUR Negative 06/06/2023    BLOODU Negative 06/06/2023    RBCUA 1-2 06/06/2023    WBCUA 10-20 (A) 06/06/2023    EPIS Moderate (A) 06/06/2023    BACTERIA Innumerable (A) 06/06/2023      No results found for: "LABPROT"  No results found for: "MICROALBUR", "FIZX81GXN"  Lab Results   Component Value Date    WBC 6.33 04/11/2023    HGB 14.7 04/11/2023    HCT 46.4 (H) 04/11/2023    MCV 92 04/11/2023     04/11/2023      Lab Results   Component Value Date    HGB 14.7 04/11/2023    HGB 14.5 03/22/2022    HGB 14.0 10/23/2019    HGB 14.8 11/29/2018    HGB 14.8 09/21/2017      No results found for: "IRON", "TIBC", "FERRITIN"   No results found for: "PTHCALCIUM", "[id number]", "PHOSPHORUS"   Lab Results   Component Value Date    CHOLESTEROL 218 (H) 04/11/2023    HDL 57 04/11/2023    LDLCALC 129 (H) 04/11/2023    TRIG 161 (H) 04/11/2023      Lab Results   Component Value Date    URICACID 8.7 (H) 06/06/2023      Lab Results   Component Value Date    HGBA1C 5.2 04/11/2023      Lab Results   Component Value Date    FREET4 1.16 04/11/2023      Lab Results   Component Value Date    SUBHASH Negative 04/07/2022      Lab Results   Component Value Date    UPEP See Comment 06/06/2023        Portions of the record may have been created with voice recognition software. Occasional wrong word or "sound a like" substitutions may have occurred due to the inherent limitations of voice recognition software.  Read the chart carefully and recognize, using context, where substitutions have occurred. If you have any questions, please contact the dictating provider.

## 2023-07-26 NOTE — PATIENT INSTRUCTIONS
Check uric acid in 1 week, if elevated would start you on allopurinol 50mg /day. This would help decrease uric acid which decreases risk of getting gout. Continue to take meloxicam once a day to help with pain or Over the count ibuprofen. Be cautious with taking a lot of NSAID. Take with food. Hydrate when you take NSAID. Try to drink 64 ounces a day. If your foot pain is not improving, call office for Xray.

## 2023-08-02 ENCOUNTER — EVALUATION (OUTPATIENT)
Dept: PHYSICAL THERAPY | Age: 63
End: 2023-08-02
Payer: COMMERCIAL

## 2023-08-02 DIAGNOSIS — M79.18 MYOFASCIAL PAIN: Primary | ICD-10-CM

## 2023-08-02 DIAGNOSIS — G89.29 CHRONIC PAIN OF BOTH KNEES: ICD-10-CM

## 2023-08-02 DIAGNOSIS — M25.561 CHRONIC PAIN OF BOTH KNEES: ICD-10-CM

## 2023-08-02 DIAGNOSIS — M54.16 LUMBAR RADICULOPATHY: ICD-10-CM

## 2023-08-02 DIAGNOSIS — M25.562 CHRONIC PAIN OF BOTH KNEES: ICD-10-CM

## 2023-08-02 PROCEDURE — 97113 AQUATIC THERAPY/EXERCISES: CPT | Performed by: PHYSICAL THERAPIST

## 2023-08-02 PROCEDURE — 97162 PT EVAL MOD COMPLEX 30 MIN: CPT | Performed by: PHYSICAL THERAPIST

## 2023-08-04 ENCOUNTER — APPOINTMENT (OUTPATIENT)
Age: 63
End: 2023-08-04
Payer: COMMERCIAL

## 2023-08-04 DIAGNOSIS — N18.2 STAGE 2 CHRONIC KIDNEY DISEASE: ICD-10-CM

## 2023-08-04 LAB — URATE SERPL-MCNC: 9.6 MG/DL (ref 2–7.5)

## 2023-08-04 PROCEDURE — 36415 COLL VENOUS BLD VENIPUNCTURE: CPT

## 2023-08-04 PROCEDURE — 84550 ASSAY OF BLOOD/URIC ACID: CPT

## 2023-08-08 ENCOUNTER — OFFICE VISIT (OUTPATIENT)
Dept: PHYSICAL THERAPY | Age: 63
End: 2023-08-08
Payer: COMMERCIAL

## 2023-08-08 DIAGNOSIS — M25.561 CHRONIC PAIN OF BOTH KNEES: ICD-10-CM

## 2023-08-08 DIAGNOSIS — M79.18 MYOFASCIAL PAIN: Primary | ICD-10-CM

## 2023-08-08 DIAGNOSIS — I10 HYPERTENSION, UNSPECIFIED TYPE: Primary | ICD-10-CM

## 2023-08-08 DIAGNOSIS — G89.29 CHRONIC PAIN OF BOTH KNEES: ICD-10-CM

## 2023-08-08 DIAGNOSIS — M25.562 CHRONIC PAIN OF BOTH KNEES: ICD-10-CM

## 2023-08-08 DIAGNOSIS — M54.16 LUMBAR RADICULOPATHY: ICD-10-CM

## 2023-08-08 PROCEDURE — 97110 THERAPEUTIC EXERCISES: CPT | Performed by: PHYSICAL THERAPIST

## 2023-08-08 PROCEDURE — 97530 THERAPEUTIC ACTIVITIES: CPT | Performed by: PHYSICAL THERAPIST

## 2023-08-08 RX ORDER — VALSARTAN AND HYDROCHLOROTHIAZIDE 160; 12.5 MG/1; MG/1
1 TABLET, FILM COATED ORAL DAILY
Qty: 90 TABLET | Refills: 0 | Status: SHIPPED | OUTPATIENT
Start: 2023-08-08

## 2023-08-08 NOTE — PROGRESS NOTES
Daily Note     Today's date: 2023  Patient name: Anil Gregory  : 1960  MRN: [id number]  Referring provider: Dennie Gave, CRNP  Dx:   Encounter Diagnosis     ICD-10-CM    1. Myofascial pain  M79.18       2. Lumbar radiculopathy  M54.16       3. Chronic pain of both knees  M25.561     M25.562     G89.29           Start Time: 1528  Stop Time: 1626  Total time in clinic (min): 58 minutes    Subjective: Patient reports she did well in the pool. Objective: See treatment diary below      Assessment: Tolerated treatment well. Patient would benefit from continued PT. Left quad weakness, Left  Hip flexion contracture. Cues for ambulation, antalgic gait with forward flexion with Left LE weight bearing. Decreased weight bearing LLE in stance with LLE ER'd and abducted. Plan: Continue per plan of care.       Precautions: B TKR , uterine CA 2017, HTN, Obesity, DM, back surgery L5 , gout R great toe,   Daily Treatment Diary                                                                             Hx of falls  Manuals 8            L hip flexor stretch in supine 5'                                                   Neuro Re-Ed                                       Ther Ex             Hip abduction 30x blue            Hip adduction Ball 30x            SLR 10x ea w/assist            B SAQ 3# 30x            B LAQ 3# 30x            bridge 30x            B heel slides 30x            Stand ball press 2" 30x            multifidus             TB row, shld extension Blue sitting 30x ea            Nustep for strength 5' L2                                      Ther Activity             Step ups  4" 10x ea                         Gait Training                                       Modalities

## 2023-08-09 ENCOUNTER — TELEPHONE (OUTPATIENT)
Dept: NEPHROLOGY | Facility: CLINIC | Age: 63
End: 2023-08-09

## 2023-08-09 ENCOUNTER — OFFICE VISIT (OUTPATIENT)
Dept: PHYSICAL THERAPY | Age: 63
End: 2023-08-09
Payer: COMMERCIAL

## 2023-08-09 DIAGNOSIS — M25.562 CHRONIC PAIN OF BOTH KNEES: ICD-10-CM

## 2023-08-09 DIAGNOSIS — M54.16 LUMBAR RADICULOPATHY: ICD-10-CM

## 2023-08-09 DIAGNOSIS — G89.29 CHRONIC PAIN OF BOTH KNEES: ICD-10-CM

## 2023-08-09 DIAGNOSIS — M79.18 MYOFASCIAL PAIN: Primary | ICD-10-CM

## 2023-08-09 DIAGNOSIS — M25.561 CHRONIC PAIN OF BOTH KNEES: ICD-10-CM

## 2023-08-09 PROCEDURE — 97113 AQUATIC THERAPY/EXERCISES: CPT

## 2023-08-09 NOTE — PROGRESS NOTES
Daily Note     Today's date: 2023  Patient name: Carlos Layne  : 1960  MRN: [id number]  Referring provider: KASSANDRA Barron  Dx:   Encounter Diagnosis     ICD-10-CM    1. Myofascial pain  M79.18       2. Lumbar radiculopathy  M54.16       3. Chronic pain of both knees  M25.561     M25.562     G89.29           Start Time:   Stop Time:   Total time in clinic (min): 60 minutes    Subjective: Patient reports that she was sore after her first land session, c/o increased stiffness driving home that night. C/o 6/10 LBP and knee pain today. Objective: See treatment diary below      Assessment: Tolerated treatment fairly well with light aquatic ex program today. Ambulates with spc flexed posture. Patient required verbal cueing throughout prescribed exercises for proper execution and dosage. Patient demonstrated fatigue post treatment and would benefit from continued PT      Plan: Continue per plan of care.       Precautions: B TKR , uterine CA , HTN, Obesity, DM, back surgery L5 , gout R great toe,   Daily Treatment Diary             Hx of falls  Date          Water Walk (FW, BW, side) x10’  10 10'         LE work at wall               Hip FF/ext swing   2'           Toe/heel   1           Hip ABD/ADD   2                      Knee flexion & extension  1           Squats  1         UE noodle x3             Push/pull   1           Rotate   1           Row forward & back   2           OH side bend            UE/Core (AROM, paddles, MB)              ABD/ADD              Horizontal Pec Fly              Alt. arm swing (shld flex/ext)              Push pull              Single paddle rotation           SLS (EO, EC, ball toss)            Aqua Step (FW, lat, eccentric)            Core work:              MF press (red, blue, blk)             Kickboard press (blue, red)              Row/ext w/ tubing (red, blue, blk)           Seated on bench             ankle DF/PF   1 March 1           ABD/ADD   1           Knee flexion/extension   2         STS  10x         DW TX - hang in the deep water  5 10'           DW ABD/ADD              DW Bicycle  5 5'           DW Scissor hip flexion/extension            Stretches              HS/calf   2           Wall stretches             Other:            Hot Tub - 10 minutes only  3 5

## 2023-08-11 DIAGNOSIS — M1A.9XX0 CHRONIC GOUT WITHOUT TOPHUS, UNSPECIFIED CAUSE, UNSPECIFIED SITE: Primary | ICD-10-CM

## 2023-08-11 RX ORDER — COLCHICINE 0.6 MG/1
0.3 TABLET ORAL DAILY
Qty: 30 TABLET | Refills: 2 | Status: SHIPPED | OUTPATIENT
Start: 2023-08-11

## 2023-08-11 RX ORDER — ALLOPURINOL 100 MG/1
50 TABLET ORAL DAILY
Qty: 30 TABLET | Refills: 2 | Status: SHIPPED | OUTPATIENT
Start: 2023-08-11

## 2023-08-14 ENCOUNTER — OFFICE VISIT (OUTPATIENT)
Dept: PHYSICAL THERAPY | Age: 63
End: 2023-08-14
Payer: COMMERCIAL

## 2023-08-14 DIAGNOSIS — M25.561 CHRONIC PAIN OF BOTH KNEES: ICD-10-CM

## 2023-08-14 DIAGNOSIS — M79.18 MYOFASCIAL PAIN: Primary | ICD-10-CM

## 2023-08-14 DIAGNOSIS — G89.29 CHRONIC PAIN OF BOTH KNEES: ICD-10-CM

## 2023-08-14 DIAGNOSIS — M54.16 LUMBAR RADICULOPATHY: ICD-10-CM

## 2023-08-14 DIAGNOSIS — M25.562 CHRONIC PAIN OF BOTH KNEES: ICD-10-CM

## 2023-08-14 PROCEDURE — 97530 THERAPEUTIC ACTIVITIES: CPT | Performed by: PHYSICAL THERAPIST

## 2023-08-14 PROCEDURE — 97110 THERAPEUTIC EXERCISES: CPT | Performed by: PHYSICAL THERAPIST

## 2023-08-14 NOTE — PROGRESS NOTES
Daily Note     Today's date: 2023  Patient name: Ahmet Campos  : 1960  MRN: [id number]  Referring provider: Kassie Schwab, CRNP  Dx:   Encounter Diagnosis     ICD-10-CM    1. Myofascial pain  M79.18       2. Lumbar radiculopathy  M54.16       3. Chronic pain of both knees  M25.561     M25.562     G89.29           Start Time: 36  Stop Time:   Total time in clinic (min): 51 minutes    Subjective: Patient reports she was a little sore after last land session with tightness in LB. Objective: See treatment diary below      Assessment: Tolerated treatment fair. Patient would benefit from continued PT. Left hip extension tight. Gets pop in LB with hip extension on left. Fearful walking due to history of left knee buckling and falling. She has antalgic gait with left weight bearing. Decreased weight thru LLE. Progressing with strength B hip flexion. Plan: Continue per plan of care.       Precautions: B TKR , uterine CA , HTN, Obesity, DM, back surgery L5 , gout R great toe,   Daily Treatment Diary                                                                             Hx of falls  Manuals            L hip flexor stretch in supine 5' 5'                                                  Neuro Re-Ed                                       Ther Ex             Hip abduction 30x blue 30x           Hip adduction Ball 30x 30x           SLR 10x ea w/assist 2x10 w/ assist           B SAQ 3# 30x 4# 30x           B LAQ 3# 30x 4# 30x           bridge 30x 30x           B heel slides 30x 30x           Stand ball press 2" 30x 3" 20x           Supine ball press  5" 20x           multifidus             TB row, shld extension Blue sitting 30x ea Stand 30x ea           Nustep for strength 5' L2 L4 15'           TKE B ball press  30x ea                        Ther Activity             Step ups  4" 10x ea 10x ea                        Gait Training Modalities

## 2023-08-16 ENCOUNTER — OFFICE VISIT (OUTPATIENT)
Dept: PHYSICAL THERAPY | Age: 63
End: 2023-08-16
Payer: COMMERCIAL

## 2023-08-16 DIAGNOSIS — M79.18 MYOFASCIAL PAIN: Primary | ICD-10-CM

## 2023-08-16 DIAGNOSIS — G89.29 CHRONIC PAIN OF BOTH KNEES: ICD-10-CM

## 2023-08-16 DIAGNOSIS — M25.562 CHRONIC PAIN OF BOTH KNEES: ICD-10-CM

## 2023-08-16 DIAGNOSIS — M54.16 LUMBAR RADICULOPATHY: ICD-10-CM

## 2023-08-16 DIAGNOSIS — M25.561 CHRONIC PAIN OF BOTH KNEES: ICD-10-CM

## 2023-08-16 PROCEDURE — 97113 AQUATIC THERAPY/EXERCISES: CPT

## 2023-08-16 NOTE — PROGRESS NOTES
Daily Note     Today's date: 2023  Patient name: Sterilng Ellis  : 1960  MRN: [id number]  Referring provider: KASSANDRA Latif  Dx:   Encounter Diagnosis     ICD-10-CM    1. Myofascial pain  M79.18       2. Lumbar radiculopathy  M54.16       3. Chronic pain of both knees  M25.561     M25.562     G89.29           Start Time:   Stop Time:   Total time in clinic (min): 60 minutes    Subjective: Patient reports 3/10 pain B knee and LBP today, notes she worked hard on land this week. Objective: See treatment diary below      Assessment: Tolerated treatment fairly well today, increased program focusing on functional mobility and core strengthening. Notes tightness bl knees post session. Steps challenging in/out pool. Patient exhibited good technique with therapeutic exercises and would benefit from continued PT      Plan: Continue per plan of care.       Precautions: B TKR , uterine CA , HTN, Obesity, DM, back surgery L5 , gout R great toe,   Daily Treatment Diary                                                                             Hx of falls  Date         Water Walk (FW, BW, side) x10’  10 10' 10        LE work at wall               Hip FF/ext swing   2' 2          Toe/heel   1 1          Hip ABD/ADD   2  1          Knee flexion & extension  1 1          Squats  1 1        UE noodle x3             Push/pull   1           Rotate   1 1          Row forward & back   2 2          OH side bend            UE/Core (AROM, paddles, MB)              ABD/ADD              Horizontal Pec Fly              Alt. arm swing (shld flex/ext)              Push pull              Single paddle rotation           SLS (EO, EC, ball toss)            Aqua Step (FW, lat, eccentric)    2' F        Core work:              MF press (red, blue, blk)             Kickboard press (blue, red)              Row/ext w/ tubing (red, blue, blk)   2' beige        Seated on bench ankle DF/PF   1 1 March 1 1          ABD/ADD   1 1          Knee flexion/extension   2 2        STS  10x 15x        DW TX - hang in the deep water  5 10' 10'          DW ABD/ADD    1'          DW Bicycle  5 5' 6'          DW Scissor hip flexion/extension    1'        Stretches              HS/calf   2 2'          Wall stretches             Other:            Hot Tub - 10 minutes only  3 5 5'

## 2023-08-21 ENCOUNTER — OFFICE VISIT (OUTPATIENT)
Dept: PHYSICAL THERAPY | Age: 63
End: 2023-08-21
Payer: COMMERCIAL

## 2023-08-21 DIAGNOSIS — M25.562 CHRONIC PAIN OF BOTH KNEES: ICD-10-CM

## 2023-08-21 DIAGNOSIS — M79.18 MYOFASCIAL PAIN: Primary | ICD-10-CM

## 2023-08-21 DIAGNOSIS — M54.16 LUMBAR RADICULOPATHY: ICD-10-CM

## 2023-08-21 DIAGNOSIS — M25.561 CHRONIC PAIN OF BOTH KNEES: ICD-10-CM

## 2023-08-21 DIAGNOSIS — G89.29 CHRONIC PAIN OF BOTH KNEES: ICD-10-CM

## 2023-08-21 PROCEDURE — 97530 THERAPEUTIC ACTIVITIES: CPT | Performed by: PHYSICAL MEDICINE & REHABILITATION

## 2023-08-21 PROCEDURE — 97110 THERAPEUTIC EXERCISES: CPT | Performed by: PHYSICAL MEDICINE & REHABILITATION

## 2023-08-21 NOTE — PROGRESS NOTES
Daily Note     Today's date: 2023  Patient name: Heide Holm  : 1960  MRN: [id number]  Referring provider: KASSANDRA Rahman  Dx:   Encounter Diagnosis     ICD-10-CM    1. Myofascial pain  M79.18       2. Lumbar radiculopathy  M54.16       3. Chronic pain of both knees  M25.561     M25.562     G89.29                      Subjective: Patient offers no new complaints to begin session. Objective: See treatment diary below    Assessment: Tolerated treatment well. Significant restriction into L hip extension. Fatigued end of session. Patient would benefit from continued PT. Continue as able nv. Plan: Continue per plan of care.       Precautions: B TKR , uterine CA , HTN, Obesity, DM, back surgery L5 , gout R great toe,   Daily Treatment Diary                                                                             Hx of falls  Manuals           L hip flexor stretch in supine 5' 5' LH                                                 Neuro Re-Ed                                       Ther Ex             Hip abduction 30x blue 30x BTB 30x          Hip adduction Ball 30x 30x 30x          SLR 10x ea w/assist 2x10 w/ assist 2x10 ea          B SAQ 3# 30x 4# 30x 30x ea, 4#          B LAQ 3# 30x 4# 30x 30x ea, 4#          bridge 30x 30x 30x          B heel slides 30x 30x           Stand ball press 2" 30x 3" 20x 20x3"          Supine ball press  5" 20x 20x5"          multifidus             TB row, shld extension Blue sitting 30x ea Stand 30x ea BTB stand 30x ea          Nustep for strength 5' L2 L4 15' L4 15'          TKE B ball press  30x ea                        Ther Activity             Step ups  4" 10x ea 10x ea 4" 10x ea                       Gait Training                                       Modalities

## 2023-08-23 ENCOUNTER — APPOINTMENT (OUTPATIENT)
Dept: PHYSICAL THERAPY | Age: 63
End: 2023-08-23
Payer: COMMERCIAL

## 2023-08-28 ENCOUNTER — OFFICE VISIT (OUTPATIENT)
Dept: PHYSICAL THERAPY | Age: 63
End: 2023-08-28
Payer: COMMERCIAL

## 2023-08-28 DIAGNOSIS — M25.561 CHRONIC PAIN OF BOTH KNEES: ICD-10-CM

## 2023-08-28 DIAGNOSIS — M79.18 MYOFASCIAL PAIN: Primary | ICD-10-CM

## 2023-08-28 DIAGNOSIS — M25.562 CHRONIC PAIN OF BOTH KNEES: ICD-10-CM

## 2023-08-28 DIAGNOSIS — G89.29 CHRONIC PAIN OF BOTH KNEES: ICD-10-CM

## 2023-08-28 DIAGNOSIS — M54.16 LUMBAR RADICULOPATHY: ICD-10-CM

## 2023-08-28 PROCEDURE — 97110 THERAPEUTIC EXERCISES: CPT

## 2023-08-28 PROCEDURE — 97530 THERAPEUTIC ACTIVITIES: CPT

## 2023-08-28 NOTE — PROGRESS NOTES
Daily Note     Today's date: 2023  Patient name: Mery Etienne  : 1960  MRN: [id number]  Referring provider: KASSANDRA Lino  Dx:   Encounter Diagnosis     ICD-10-CM    1. Myofascial pain  M79.18       2. Lumbar radiculopathy  M54.16       3. Chronic pain of both knees  M25.561     M25.562     G89.29                      Subjective: Patient offers no new complaints to begin session. Objective: See treatment diary below    Assessment: Tolerated treatment well. Pt continues to demonstrate increased tightness in her L hip flexor with continued education given on the importance of self stretching at home. Patient would benefit from continued PT. Continue as able nv. Plan: Continue per plan of care.       Precautions: B TKR , uterine CA , HTN, Obesity, DM, back surgery L5 , gout R great toe,   Daily Treatment Diary                                                                             Hx of falls  Manuals          L hip flexor stretch in supine 5' 5' [facility]                                                Neuro Re-Ed                                       Ther Ex             Hip abduction 30x blue 30x BTB 30x BTB 30x         Hip adduction Ball 30x 30x 30x 30x         SLR 10x ea w/assist 2x10 w/ assist 2x10 ea 3x10         B SAQ 3# 30x 4# 30x 30x ea, 4# 30x ea 4#         B LAQ 3# 30x 4# 30x 30x ea, 4# 30x ea 4#         bridge 30x 30x 30x 30x         B heel slides 30x 30x           Stand ball press 2" 30x 3" 20x 20x3" 20x3"         Supine ball press  5" 20x 20x5" 20x5"         multifidus             TB row, shld extension Blue sitting 30x ea Stand 30x ea BTB stand 30x ea BTB stand 30x ea         Nustep for strength 5' L2 L4 15' L4 15' L4 15'         TKE B ball press  30x ea                        Ther Activity            Step ups  4" 10x ea 10x ea 4" 10x ea 4" 10x ea                      Gait Training                                       Modalities

## 2023-08-31 ENCOUNTER — OFFICE VISIT (OUTPATIENT)
Dept: PHYSICAL THERAPY | Age: 63
End: 2023-08-31
Payer: COMMERCIAL

## 2023-08-31 DIAGNOSIS — M25.562 CHRONIC PAIN OF BOTH KNEES: ICD-10-CM

## 2023-08-31 DIAGNOSIS — M25.561 CHRONIC PAIN OF BOTH KNEES: ICD-10-CM

## 2023-08-31 DIAGNOSIS — M54.16 LUMBAR RADICULOPATHY: ICD-10-CM

## 2023-08-31 DIAGNOSIS — G89.29 CHRONIC PAIN OF BOTH KNEES: ICD-10-CM

## 2023-08-31 DIAGNOSIS — M79.18 MYOFASCIAL PAIN: Primary | ICD-10-CM

## 2023-08-31 PROCEDURE — 97113 AQUATIC THERAPY/EXERCISES: CPT

## 2023-08-31 NOTE — PROGRESS NOTES
Daily Note     Today's date: 2023  Patient name: Gay Chawla  : 1960  MRN: [id number]  Referring provider: KASSANDRA Mcdonough  Dx:   Encounter Diagnosis     ICD-10-CM    1. Myofascial pain  M79.18       2. Lumbar radiculopathy  M54.16       3. Chronic pain of both knees  M25.561     M25.562     G89.29           Start Time: 1600  Stop Time: 1700  Total time in clinic (min): 60 minutes    Subjective: pt notes overall doing well w/ PT. Knee pain 1-2/10 and LBP 3/10. Objective: See treatment diary below      Assessment: Tolerated treatment fairly well. Pt progressing well w/ all ex's. Difficulty w/ step leaving pool and entering pool. Patient would benefit from continued PT      Plan: Continue per plan of care.       Precautions: B TKR , uterine CA , HTN, Obesity, DM, back surgery L5 , gout R great toe,   Daily Treatment Diary                                                                             Hx of falls    Date        Water Walk (FW, BW, side) x10’  10 10' 10 10       LE work at wall               Hip FF/ext swing   2' 2 2         Toe/heel   1 1 1         Hip ABD/ADD   2 2 2          1 1         Knee flexion & extension  1 1 1         Squats  1 1 1       UE noodle x3             Push/pull   1           Rotate   1 1 1         Row forward & back   2 2 2         OH side bend            UE/Core (AROM, paddles, MB)              ABD/ADD              Horizontal Pec Fly              Alt. arm swing (shld flex/ext)              Push pull              Single paddle rotation           SLS (EO, EC, ball toss)            Aqua Step (FW, lat, eccentric)    2' F 2       Core work:              MF press (red, blue, blk)             Kickboard press (blue, red)              Row/ext w/ tubing (red, blue, blk)   2' beige 2       Seated on bench             ankle DF/PF   1 1 1         March   1 1 1         ABD/ADD   1 1 1         Knee flexion/extension   2 2 2       STS 10x 15x 1'       DW TX - hang in the deep water  5 10' 10' 10         DW ABD/ADD    1' 1         DW Bicycle  5 5' 6' 6         DW Scissor hip flexion/extension    1' 1       Stretches              HS/calf   2 2' 2         Wall stretches             Other:            Hot Tub - 10 minutes only  3 5 5' nt

## 2023-09-05 ENCOUNTER — OFFICE VISIT (OUTPATIENT)
Dept: PHYSICAL THERAPY | Age: 63
End: 2023-09-05
Payer: COMMERCIAL

## 2023-09-05 DIAGNOSIS — M25.561 CHRONIC PAIN OF BOTH KNEES: ICD-10-CM

## 2023-09-05 DIAGNOSIS — G89.29 CHRONIC PAIN OF BOTH KNEES: ICD-10-CM

## 2023-09-05 DIAGNOSIS — M79.18 MYOFASCIAL PAIN: ICD-10-CM

## 2023-09-05 DIAGNOSIS — M54.16 LUMBAR RADICULOPATHY: Primary | ICD-10-CM

## 2023-09-05 DIAGNOSIS — M25.562 CHRONIC PAIN OF BOTH KNEES: ICD-10-CM

## 2023-09-05 PROCEDURE — 97140 MANUAL THERAPY 1/> REGIONS: CPT | Performed by: PHYSICAL THERAPIST

## 2023-09-05 PROCEDURE — 97110 THERAPEUTIC EXERCISES: CPT | Performed by: PHYSICAL THERAPIST

## 2023-09-05 NOTE — PROGRESS NOTES
Daily Note     Today's date: 2023  Patient name: Brandy Garvin  : 1960  MRN: [id number]  Referring provider: KASSANDRA Brown  Dx:   Encounter Diagnosis     ICD-10-CM    1. Lumbar radiculopathy  M54.16       2. Chronic pain of both knees  M25.561     M25.562     G89.29       3. Myofascial pain  M79.18           Start Time: 1530  Stop Time: 1615  Total time in clinic (min): 45 minutes    Subjective: patient complains of left hip flexor tightness      Objective: See treatment diary below      Assessment: Tolerated treatment fair. Patient demonstrated fatigue post treatment, exhibited good technique with therapeutic exercises and would benefit from continued PT, still with left hip flexor tightness and back pain at 3/10      Plan: Progress treatment as tolerated.        Precautions: B TKR , uterine CA , HTN, Obesity, DM, back surgery L5 , gout R great toe,   Daily Treatment Diary                                                                               Hx of falls  Manuals  9/        L hip flexor stretch in supine 5' 5' [facility] 5'                                               Neuro Re-Ed                                       Ther Ex             Hip abduction 30x blue 30x BTB 30x BTB 30x 30x        Hip adduction Ball 30x 30x 30x 30x 30x        SLR 10x ea w/assist 2x10 w/ assist 2x10 ea 3x10 30x        B SAQ 3# 30x 4# 30x 30x ea, 4# 30x ea 4# 30x        B LAQ 3# 30x 4# 30x 30x ea, 4# 30x ea 4# 30x        bridge 30x 30x 30x 30x 30x        B heel slides 30x 30x           Stand ball press 2" 30x 3" 20x 20x3" 20x3" 20x        Supine ball press  5" 20x 20x5" 20x5" 20x        multifidus             TB row, shld extension Blue sitting 30x ea Stand 30x ea BTB stand 30x ea BTB stand 30x ea 30x        Nustep for strength 5' L2 L4 15' L4 15' L4 15' 15 min        TKE B ball press  30x ea                        Ther Activity            Step ups  4" 10x ea 10x ea 4" 10x ea 4" 10x ea 10x                     Gait Training                                       Modalities

## 2023-09-06 ENCOUNTER — APPOINTMENT (OUTPATIENT)
Dept: PHYSICAL THERAPY | Age: 63
End: 2023-09-06
Payer: COMMERCIAL

## 2023-09-07 ENCOUNTER — OFFICE VISIT (OUTPATIENT)
Dept: PHYSICAL THERAPY | Age: 63
End: 2023-09-07
Payer: COMMERCIAL

## 2023-09-07 DIAGNOSIS — M79.18 MYOFASCIAL PAIN: ICD-10-CM

## 2023-09-07 DIAGNOSIS — M25.562 CHRONIC PAIN OF BOTH KNEES: ICD-10-CM

## 2023-09-07 DIAGNOSIS — M25.561 CHRONIC PAIN OF BOTH KNEES: ICD-10-CM

## 2023-09-07 DIAGNOSIS — G89.29 CHRONIC PAIN OF BOTH KNEES: ICD-10-CM

## 2023-09-07 DIAGNOSIS — M54.16 LUMBAR RADICULOPATHY: Primary | ICD-10-CM

## 2023-09-07 PROCEDURE — 97113 AQUATIC THERAPY/EXERCISES: CPT

## 2023-09-07 NOTE — PROGRESS NOTES
Daily Note     Today's date: 2023  Patient name: Lena Mack  : 1960  MRN: [id number]  Referring provider: KASSANDRA Hernandez  Dx:   Encounter Diagnosis     ICD-10-CM    1. Lumbar radiculopathy  M54.16       2. Chronic pain of both knees  M25.561     M25.562     G89.29       3. Myofascial pain  M79.18           Start Time: 1600  Stop Time: 1700  Total time in clinic (min): 60 minutes    Subjective: pt notes her back is bothering her more today. She feels stronger in her legs. Objective: See treatment diary below      Assessment: Tolerated treatment well. Added lateral step ups today. Improved sit to stand and knee flexion w/ squats. Patient would benefit from continued PT      Plan: Continue per plan of care.       Precautions: B TKR , uterine CA , HTN, Obesity, DM, back surgery L5 , gout R great toe,   Daily Treatment Diary                                                                               Hx of falls    Date       Water Walk (FW, BW, side) x10’  10 10' 10 10 10      LE work at wall               Hip FF/ext swing   2 2 2 2        Toe/heel   1 1 1 1        Hip ABD/ADD   2 2 2 2         1 1 1        Knee flexion & extension  1 1 1 1        Squats  1 1 1 1      UE noodle x3             Push/pull   1           Rotate   1 1 1 1        Row forward & back   2 2 2 2        OH side bend            UE/Core (AROM, paddles, MB)              ABD/ADD              Horizontal Pec Fly              Alt. arm swing (shld flex/ext)              Push pull              Single paddle rotation           SLS (EO, EC, ball toss)            Aqua Step (FW, lat, eccentric)    2' F 2 2, F 2, L      Core work:              MF press (red, blue, blk)             Kickboard press (blue, red)              Row/ext w/ tubing (red, blue, blk)   2' beige 2 2      Seated on bench             ankle DF/PF   1 1 1 1         1 1 1        ABD/ADD   1 1 1 1        Knee flexion/extension   2 2 2 2      STS  10x 15x 1' 1'      DW TX - hang in the deep water  5 10' 10' 10 10        DW ABD/ADD    1' 1 1        DW Bicycle  5 5' 6' 6 6        DW Scissor hip flexion/extension    1' 1 1      Stretches              HS/calf   2 2' 2 2        Wall stretches             Other:            Hot Tub - 10 minutes only  3 5 5' nt nt

## 2023-09-07 NOTE — PROGRESS NOTES
Spoke with patient after session today. She feels she is able to do the land exercises on her own at home. She was instructed in hip flexor stretch and reviewed today. She will return to fitness program in the water as she is compliant and knowledgeable with program in the water. D/c PT to fitness. Partial goals met.

## 2023-09-15 ENCOUNTER — APPOINTMENT (OUTPATIENT)
Age: 63
End: 2023-09-15
Payer: COMMERCIAL

## 2023-09-15 DIAGNOSIS — M1A.9XX0 CHRONIC GOUT WITHOUT TOPHUS, UNSPECIFIED CAUSE, UNSPECIFIED SITE: ICD-10-CM

## 2023-09-15 LAB
ALBUMIN SERPL BCP-MCNC: 4.1 G/DL (ref 3.5–5)
ALP SERPL-CCNC: 109 U/L (ref 34–104)
ALT SERPL W P-5'-P-CCNC: 18 U/L (ref 7–52)
ANION GAP SERPL CALCULATED.3IONS-SCNC: 11 MMOL/L
AST SERPL W P-5'-P-CCNC: 17 U/L (ref 13–39)
BILIRUB SERPL-MCNC: 0.84 MG/DL (ref 0.2–1)
BUN SERPL-MCNC: 22 MG/DL (ref 5–25)
CALCIUM SERPL-MCNC: 10 MG/DL (ref 8.4–10.2)
CHLORIDE SERPL-SCNC: 102 MMOL/L (ref 96–108)
CO2 SERPL-SCNC: 25 MMOL/L (ref 21–32)
CREAT SERPL-MCNC: 0.93 MG/DL (ref 0.6–1.3)
ERYTHROCYTE [DISTWIDTH] IN BLOOD BY AUTOMATED COUNT: 12.8 % (ref 11.6–15.1)
GFR SERPL CREATININE-BSD FRML MDRD: 65 ML/MIN/1.73SQ M
GLUCOSE P FAST SERPL-MCNC: 120 MG/DL (ref 65–99)
HCT VFR BLD AUTO: 47 % (ref 34.8–46.1)
HGB BLD-MCNC: 15.5 G/DL (ref 11.5–15.4)
MCH RBC QN AUTO: 30.2 PG (ref 26.8–34.3)
MCHC RBC AUTO-ENTMCNC: 33 G/DL (ref 31.4–37.4)
MCV RBC AUTO: 92 FL (ref 82–98)
PLATELET # BLD AUTO: 277 THOUSANDS/UL (ref 149–390)
PMV BLD AUTO: 10.3 FL (ref 8.9–12.7)
POTASSIUM SERPL-SCNC: 4.1 MMOL/L (ref 3.5–5.3)
PROT SERPL-MCNC: 7.6 G/DL (ref 6.4–8.4)
RBC # BLD AUTO: 5.13 MILLION/UL (ref 3.81–5.12)
SODIUM SERPL-SCNC: 138 MMOL/L (ref 135–147)
URATE SERPL-MCNC: 9.6 MG/DL (ref 2–7.5)
WBC # BLD AUTO: 8.29 THOUSAND/UL (ref 4.31–10.16)

## 2023-09-15 PROCEDURE — 84550 ASSAY OF BLOOD/URIC ACID: CPT

## 2023-09-15 PROCEDURE — 36415 COLL VENOUS BLD VENIPUNCTURE: CPT

## 2023-09-15 PROCEDURE — 80053 COMPREHEN METABOLIC PANEL: CPT

## 2023-09-15 PROCEDURE — 85027 COMPLETE CBC AUTOMATED: CPT

## 2023-09-18 DIAGNOSIS — M1A.9XX0 CHRONIC GOUT WITHOUT TOPHUS, UNSPECIFIED CAUSE, UNSPECIFIED SITE: ICD-10-CM

## 2023-09-18 RX ORDER — ALLOPURINOL 100 MG/1
100 TABLET ORAL DAILY
Qty: 90 TABLET | Refills: 0 | Status: SHIPPED | OUTPATIENT
Start: 2023-09-18 | End: 2023-09-19

## 2023-09-19 NOTE — PROGRESS NOTES
Patient has allergic reaction to allopurinol will stop now. This is why uric acid elevated. Can stop colchicine daily as this will just be used in a flare. Can consider other alternative like uloric in the future.

## 2023-10-10 DIAGNOSIS — I10 HYPERTENSION, UNSPECIFIED TYPE: ICD-10-CM

## 2023-10-10 RX ORDER — VALSARTAN AND HYDROCHLOROTHIAZIDE 160; 12.5 MG/1; MG/1
1 TABLET, FILM COATED ORAL DAILY
Qty: 90 TABLET | Refills: 0 | Status: SHIPPED | OUTPATIENT
Start: 2023-10-10

## 2023-11-04 DIAGNOSIS — E11.69 TYPE 2 DIABETES MELLITUS WITH OTHER SPECIFIED COMPLICATION, UNSPECIFIED WHETHER LONG TERM INSULIN USE (HCC): Primary | ICD-10-CM

## 2023-11-06 RX ORDER — SEMAGLUTIDE 1.34 MG/ML
INJECTION, SOLUTION SUBCUTANEOUS
Qty: 3 ML | Refills: 5 | Status: SHIPPED | OUTPATIENT
Start: 2023-11-06

## 2023-11-13 RX ORDER — BROMFENAC SODIUM 0.7 MG/ML
SOLUTION/ DROPS OPHTHALMIC
COMMUNITY
Start: 2023-10-16

## 2023-11-15 PROBLEM — C54.1 ENDOMETRIAL CARCINOMA (HCC): Status: ACTIVE | Noted: 2017-09-28

## 2023-11-15 PROBLEM — E66.01 MORBID OBESITY (HCC): Chronic | Status: ACTIVE | Noted: 2021-02-15

## 2023-11-15 PROBLEM — K63.5 POLYP OF COLON: Status: ACTIVE | Noted: 2017-09-28

## 2023-11-15 PROBLEM — I10 BENIGN ESSENTIAL HYPERTENSION: Status: ACTIVE | Noted: 2017-09-28

## 2023-11-15 PROBLEM — R26.2 AMBULATORY DYSFUNCTION: Status: ACTIVE | Noted: 2021-02-15

## 2023-11-16 ENCOUNTER — CONSULT (OUTPATIENT)
Dept: FAMILY MEDICINE CLINIC | Facility: CLINIC | Age: 63
End: 2023-11-16
Payer: COMMERCIAL

## 2023-11-16 ENCOUNTER — APPOINTMENT (OUTPATIENT)
Age: 63
End: 2023-11-16
Payer: COMMERCIAL

## 2023-11-16 ENCOUNTER — TELEPHONE (OUTPATIENT)
Dept: ADMINISTRATIVE | Facility: OTHER | Age: 63
End: 2023-11-16

## 2023-11-16 VITALS
DIASTOLIC BLOOD PRESSURE: 62 MMHG | WEIGHT: 293 LBS | HEIGHT: 72 IN | SYSTOLIC BLOOD PRESSURE: 138 MMHG | BODY MASS INDEX: 39.68 KG/M2 | HEART RATE: 97 BPM | TEMPERATURE: 98.2 F

## 2023-11-16 DIAGNOSIS — Z01.818 ENCOUNTER FOR PRE-OPERATIVE EXAMINATION: Primary | ICD-10-CM

## 2023-11-16 DIAGNOSIS — E11.69 TYPE 2 DIABETES MELLITUS WITH OTHER SPECIFIED COMPLICATION, UNSPECIFIED WHETHER LONG TERM INSULIN USE (HCC): ICD-10-CM

## 2023-11-16 DIAGNOSIS — N30.00 ACUTE CYSTITIS WITHOUT HEMATURIA: ICD-10-CM

## 2023-11-16 DIAGNOSIS — I10 BENIGN ESSENTIAL HYPERTENSION: ICD-10-CM

## 2023-11-16 DIAGNOSIS — I10 HYPERTENSION, UNSPECIFIED TYPE: ICD-10-CM

## 2023-11-16 PROCEDURE — 87086 URINE CULTURE/COLONY COUNT: CPT

## 2023-11-16 PROCEDURE — 87077 CULTURE AEROBIC IDENTIFY: CPT

## 2023-11-16 PROCEDURE — 99213 OFFICE O/P EST LOW 20 MIN: CPT | Performed by: FAMILY MEDICINE

## 2023-11-16 PROCEDURE — 87186 SC STD MICRODIL/AGAR DIL: CPT

## 2023-11-16 RX ORDER — METFORMIN HYDROCHLORIDE 500 MG/1
500 TABLET, EXTENDED RELEASE ORAL
COMMUNITY
End: 2023-11-16 | Stop reason: SDUPTHER

## 2023-11-16 RX ORDER — METFORMIN HYDROCHLORIDE 500 MG/1
TABLET, EXTENDED RELEASE ORAL
Qty: 180 TABLET | Refills: 3 | Status: SHIPPED | OUTPATIENT
Start: 2023-11-16

## 2023-11-16 RX ORDER — CEPHALEXIN 500 MG/1
500 CAPSULE ORAL 2 TIMES DAILY
Qty: 20 CAPSULE | Refills: 0 | Status: SHIPPED | OUTPATIENT
Start: 2023-11-16 | End: 2023-11-26

## 2023-11-16 RX ORDER — VALSARTAN AND HYDROCHLOROTHIAZIDE 160; 12.5 MG/1; MG/1
1 TABLET, FILM COATED ORAL DAILY
Qty: 90 TABLET | Refills: 3 | Status: SHIPPED | OUTPATIENT
Start: 2023-11-16

## 2023-11-16 NOTE — PROGRESS NOTES
Name: Aracelis Finn      : 1960      MRN: [id number]  Encounter Provider: Jeremiah Sommer MD  Encounter Date: 2023   Encounter department: [address] [place] [address]     1. Encounter for pre-operative examination  Comments:  Advise she stop aspirin and all vitamins 5 days before surgery. She should take her BP pill a.m. of surgery. medically cleared  - moderate risk    2. Acute cystitis without hematuria  Comments:  urine C&S   keflex  Orders:  -     Urine culture; Future  -     cephalexin (KEFLEX) 500 mg capsule; Take 1 capsule (500 mg total) by mouth 2 (two) times a day for 10 days    3. Type 2 diabetes mellitus with other specified complication, unspecified whether long term insulin use (HCC)  Comments:  well-controlled  Orders:  -     metFORMIN (GLUCOPHAGE-XR) 500 mg 24 hr tablet; Patient takes 2 tablets PO with evening meal    4. Benign essential hypertension  Comments:  controlled    5. Hypertension, unspecified type  -     valsartan-hydrochlorothiazide (DIOVAN-HCT) 160-12.5 MG per tablet; Take 1 tablet by mouth daily      BMI Counseling: Body mass index is 46.49 kg/m². The BMI is above normal. Nutrition recommendations include decreasing portion sizes, encouraging healthy choices of fruits and vegetables, limiting drinks that contain sugar and reducing intake of cholesterol. Exercise recommendations include exercising 3-5 times per week. Pharmacotherapy was ordered to help aid in weight loss. Rationale for BMI follow-up plan is due to patient being overweight or obese. Depression Screening and Follow-up Plan: Patient was screened for depression during today's encounter. They screened negative with a PHQ-2 score of 0. Subjective      Patient here for preop clearance for cataract surgery scheduled  and . She was not given any instructions regarding medicine and she is concerned about taking her aspirin.   She is without other complaints other than recent increased frequency of urination with some dysuria, symptoms occurring over the past week without fever or hematuria and without abdominal pain. He has chronic low back pain. She did bring a urine specimen with her. Her other medical problems are stable with routine follow-up with Dr. Linwood Gandhi. Her last hemoglobin A1c was 5.2. Due to her chronic low back pain she does participate in therapy and also water therapy, at present discontinued due to long drive from and had to fill in the dark. Review of Systems   Constitutional:  Negative for chills and fever. HENT:  Negative for ear pain and sore throat. Eyes:  Negative for pain and visual disturbance. Respiratory:  Negative for cough and shortness of breath. Cardiovascular:  Negative for chest pain and palpitations. Gastrointestinal:  Negative for abdominal pain and vomiting. Genitourinary:  Positive for difficulty urinating, dysuria and frequency. Negative for flank pain and hematuria. Musculoskeletal:  Positive for back pain and gait problem. Negative for arthralgias. Skin:  Negative for color change and rash. Neurological:  Negative for seizures and syncope. Hematological:  Does not bruise/bleed easily. All other systems reviewed and are negative. Current Outpatient Medications on File Prior to Visit   Medication Sig   • aspirin (ECOTRIN LOW STRENGTH) 81 mg EC tablet Take 81 mg by mouth daily   • Cholecalciferol 10 MCG (400 UNIT) CHEW Take by mouth.    • Diclofenac Sodium (VOLTAREN) 1 % Apply topically every 6 (six) hours   • methocarbamol (ROBAXIN) 500 mg tablet Take 500 mg by mouth 4 (four) times a day as needed   • multivitamin (THERAGRAN) TABS Take 1 tablet by mouth daily   • Pain Relief Extra Strength 500 MG tablet take 2 tablets by mouth every 8 hours for 10 days   • Prolensa 0.07 % SOLN instill 1 drop into left eye STARTING WHEN YOU GET HOME FROM SURGERY once daily for 28 DAYS   • semaglutide, 1 mg/dose, (Ozempic, 1 MG/DOSE,) 4 mg/3 mL injection pen INJECT 1MG SUBCUTANEOUSLY ONCE A WEEK ON THE SAME DAY EACH WEEK   • traMADol (ULTRAM) 50 mg tablet Take 50 mg by mouth every 6 (six) hours as needed   • [DISCONTINUED] metFORMIN (GLUCOPHAGE) 500 mg tablet Take 500 mg by mouth 2 (two) times a day with meals   • [DISCONTINUED] metFORMIN (GLUCOPHAGE-XR) 500 mg 24 hr tablet Take 500 mg by mouth daily with dinner Patient takes 2 tablets PO with evening meal   • [DISCONTINUED] valsartan-hydrochlorothiazide (DIOVAN-HCT) 160-12.5 MG per tablet Take 1 tablet by mouth daily   • semaglutide, 0.25 or 0.5 mg/dose, (Ozempic) 2 mg/1.5 mL injection pen INJECT 0.5 MG UNDER THE SKIN WEEKLY   • [DISCONTINUED] WHEAT DEXTRIN-VIT B6-B12-FA PO Take by mouth (Patient not taking: Reported on 7/26/2023)       Objective     /62 (BP Location: Left arm, Patient Position: Sitting, Cuff Size: Large)   Pulse 97   Temp 98.2 °F (36.8 °C) (Tympanic)   Ht 6' (1.829 m)   Wt (!) 155 kg (342 lb 12.8 oz)   BMI 46.49 kg/m²     Physical Exam  Constitutional:       Appearance: Normal appearance. She is obese. Neck:      Vascular: No carotid bruit. Cardiovascular:      Rate and Rhythm: Normal rate and regular rhythm. Pulses: Normal pulses. Heart sounds: Normal heart sounds. No murmur heard. Pulmonary:      Effort: Pulmonary effort is normal.      Breath sounds: Normal breath sounds. Abdominal:      Palpations: Abdomen is soft. There is no mass. Tenderness: There is no abdominal tenderness. Musculoskeletal:      Right lower leg: No edema. Left lower leg: No edema. Lymphadenopathy:      Cervical: No cervical adenopathy. Skin:     General: Skin is warm and dry. Neurological:      General: No focal deficit present. Mental Status: She is oriented to person, place, and time.        Henrik Madrigal MD

## 2023-11-16 NOTE — LETTER
Diabetic Eye Exam Form  Second request    Date Requested: 23  Patient: Shea Ear  Patient : 1960   Referring Provider: KASSANDRA Andrade      DIABETIC Eye Exam Date _______________________________      Type of Exam MUST be documented for Diabetic Eye Exams. Please CHECK ONE. Retinal Exam       Dilated Retinal Exam       OCT       Optomap-Iris Exam      Fundus Photography       Left Eye - Please check Retinopathy or No Retinopathy        Exam did show retinopathy    Exam did not show retinopathy       Right Eye - Please check Retinopathy or No Retinopathy       Exam did show retinopathy    Exam did not show retinopathy       Comments __________________________________________________________    Practice Providing Exam ______________________________________________    Exam Performed By (print name) _______________________________________      Provider Signature ___________________________________________________      These reports are needed for  compliance. Please fax this completed form and a copy of the Diabetic Eye Exam report to our office located at [address] [place] [address] as soon as possible via Fax [phone] attention Trish Rowley: Phone [phone]  We thank you for your assistance in treating our mutual patient.

## 2023-11-16 NOTE — LETTER
Diabetic Eye Exam Form    Date Requested: 23  Patient: Sheila Salgado  Patient : 1960   Referring Provider: KASSANDRA Herrera      DIABETIC Eye Exam Date _______________________________      Type of Exam MUST be documented for Diabetic Eye Exams. Please CHECK ONE. Retinal Exam       Dilated Retinal Exam       OCT       Optomap-Iris Exam      Fundus Photography       Left Eye - Please check Retinopathy or No Retinopathy        Exam did show retinopathy    Exam did not show retinopathy       Right Eye - Please check Retinopathy or No Retinopathy       Exam did show retinopathy    Exam did not show retinopathy       Comments __________________________________________________________    Practice Providing Exam ______________________________________________    Exam Performed By (print name) _______________________________________      Provider Signature ___________________________________________________      These reports are needed for  compliance. Please fax this completed form and a copy of the Diabetic Eye Exam report to our office located at [address] [place] [address] as soon as possible via Fax [phone] attention Sonja [place]: Phone [phone]  We thank you for your assistance in treating our mutual patient.

## 2023-11-16 NOTE — TELEPHONE ENCOUNTER
----- Message from Simon Ch MA sent at 11/16/2023 10:38 AM EST -----  Regarding: Dm Optho exam  11/16/23 10:38 AM    Hello, our patient Lena Mack has had Diabetic Eye Exam completed/performed. Please assist in updating the patient chart by making an External outreach to [facility] facility located in . [facility]  [email or web address]  [address], [address] The date of service is 10/2023.     Thank you,  Simon Ch MA  [facility]

## 2023-11-16 NOTE — TELEPHONE ENCOUNTER
Upon review of the In Basket request and the patient's chart, initial outreach has been made via fax to facility. Please see Contacts section for details.      Thank you  Isaac Diaz MA

## 2023-11-18 LAB — BACTERIA UR CULT: ABNORMAL

## 2023-11-29 NOTE — TELEPHONE ENCOUNTER
As a follow-up, a second attempt has been made for outreach via fax to facility. Please see Contacts section for details.     Thank you  Alethea Melchor MA

## 2023-11-30 NOTE — TELEPHONE ENCOUNTER
Upon review of the In Basket request we have found/obtained the documentation. After careful review of the document we are unable to complete this request for Diabetic Eye Exam because the documentation does not have the proper verbiage (wording) needed to close the requested care gap(s) and the documentation does not have the result(s) needed to close the requested care gap(s). Any additional questions or concerns should be emailed to the Practice Liaisons via the appropriate education email address, please do not reply via In Basket.     Thank you  Mario De La Rosa MA

## 2023-12-20 ENCOUNTER — TELEPHONE (OUTPATIENT)
Dept: ADMINISTRATIVE | Facility: OTHER | Age: 63
End: 2023-12-20

## 2023-12-20 NOTE — TELEPHONE ENCOUNTER
Upon review of the In Basket request we have found this is a duplicate request and no further action is needed. This request was completed upon initial request, the patient chart is up to date, and this message will now be closed.    Any additional questions or concerns should be emailed to the Practice Liaisons via the appropriate education email address, please do not reply via In Basket.    Thank you  Gutierrez Gama MA

## 2023-12-21 ENCOUNTER — APPOINTMENT (OUTPATIENT)
Age: 63
End: 2023-12-21
Payer: COMMERCIAL

## 2023-12-21 DIAGNOSIS — N18.2 STAGE 2 CHRONIC KIDNEY DISEASE: ICD-10-CM

## 2023-12-21 LAB
ALBUMIN SERPL BCP-MCNC: 4 G/DL (ref 3.5–5)
ALP SERPL-CCNC: 109 U/L (ref 34–104)
ALT SERPL W P-5'-P-CCNC: 15 U/L (ref 7–52)
ANION GAP SERPL CALCULATED.3IONS-SCNC: 8 MMOL/L
AST SERPL W P-5'-P-CCNC: 17 U/L (ref 13–39)
BACTERIA UR QL AUTO: ABNORMAL /HPF
BILIRUB SERPL-MCNC: 0.82 MG/DL (ref 0.2–1)
BILIRUB UR QL STRIP: NEGATIVE
BUN SERPL-MCNC: 21 MG/DL (ref 5–25)
CALCIUM SERPL-MCNC: 9.6 MG/DL (ref 8.4–10.2)
CHLORIDE SERPL-SCNC: 101 MMOL/L (ref 96–108)
CLARITY UR: ABNORMAL
CO2 SERPL-SCNC: 31 MMOL/L (ref 21–32)
COLOR UR: ABNORMAL
CREAT SERPL-MCNC: 1 MG/DL (ref 0.6–1.3)
CREAT UR-MCNC: 53.5 MG/DL
GFR SERPL CREATININE-BSD FRML MDRD: 60 ML/MIN/1.73SQ M
GLUCOSE P FAST SERPL-MCNC: 106 MG/DL (ref 65–99)
GLUCOSE UR STRIP-MCNC: NEGATIVE MG/DL
HGB UR QL STRIP.AUTO: ABNORMAL
KETONES UR STRIP-MCNC: NEGATIVE MG/DL
LEUKOCYTE ESTERASE UR QL STRIP: ABNORMAL
MAGNESIUM SERPL-MCNC: 1.9 MG/DL (ref 1.9–2.7)
MICROALBUMIN UR-MCNC: 52.4 MG/L
MICROALBUMIN/CREAT 24H UR: 98 MG/G CREATININE (ref 0–30)
NITRITE UR QL STRIP: NEGATIVE
NON-SQ EPI CELLS URNS QL MICRO: ABNORMAL /HPF
PH UR STRIP.AUTO: 6 [PH]
PHOSPHATE SERPL-MCNC: 3.4 MG/DL (ref 2.3–4.1)
POTASSIUM SERPL-SCNC: 4.6 MMOL/L (ref 3.5–5.3)
PROT SERPL-MCNC: 7.6 G/DL (ref 6.4–8.4)
PROT UR STRIP-MCNC: ABNORMAL MG/DL
PTH-INTACT SERPL-MCNC: 75.9 PG/ML (ref 12–88)
RBC #/AREA URNS AUTO: ABNORMAL /HPF
SODIUM SERPL-SCNC: 140 MMOL/L (ref 135–147)
SP GR UR STRIP.AUTO: 1.01 (ref 1–1.03)
URATE SERPL-MCNC: 7.4 MG/DL (ref 2–7.5)
UROBILINOGEN UR STRIP-ACNC: <2 MG/DL
WBC #/AREA URNS AUTO: ABNORMAL /HPF

## 2023-12-21 PROCEDURE — 82043 UR ALBUMIN QUANTITATIVE: CPT

## 2023-12-21 PROCEDURE — 36415 COLL VENOUS BLD VENIPUNCTURE: CPT

## 2023-12-21 PROCEDURE — 83970 ASSAY OF PARATHORMONE: CPT

## 2023-12-21 PROCEDURE — 83735 ASSAY OF MAGNESIUM: CPT

## 2023-12-21 PROCEDURE — 82570 ASSAY OF URINE CREATININE: CPT

## 2023-12-21 PROCEDURE — 80053 COMPREHEN METABOLIC PANEL: CPT

## 2023-12-21 PROCEDURE — 81001 URINALYSIS AUTO W/SCOPE: CPT

## 2023-12-21 PROCEDURE — 84550 ASSAY OF BLOOD/URIC ACID: CPT

## 2023-12-21 PROCEDURE — 84100 ASSAY OF PHOSPHORUS: CPT

## 2024-01-26 ENCOUNTER — OFFICE VISIT (OUTPATIENT)
Dept: FAMILY MEDICINE CLINIC | Facility: CLINIC | Age: 64
End: 2024-01-26
Payer: COMMERCIAL

## 2024-01-26 VITALS
SYSTOLIC BLOOD PRESSURE: 100 MMHG | OXYGEN SATURATION: 98 % | WEIGHT: 293 LBS | DIASTOLIC BLOOD PRESSURE: 62 MMHG | HEART RATE: 96 BPM | BODY MASS INDEX: 39.68 KG/M2 | HEIGHT: 72 IN | TEMPERATURE: 97 F

## 2024-01-26 DIAGNOSIS — Z12.31 ENCOUNTER FOR SCREENING MAMMOGRAM FOR BREAST CANCER: ICD-10-CM

## 2024-01-26 DIAGNOSIS — I10 BENIGN ESSENTIAL HYPERTENSION: ICD-10-CM

## 2024-01-26 DIAGNOSIS — L30.4 INTERTRIGO: ICD-10-CM

## 2024-01-26 DIAGNOSIS — N39.0 URINARY TRACT INFECTION WITHOUT HEMATURIA, SITE UNSPECIFIED: Primary | ICD-10-CM

## 2024-01-26 DIAGNOSIS — R42 LIGHTHEADED: ICD-10-CM

## 2024-01-26 DIAGNOSIS — Z23 ENCOUNTER FOR IMMUNIZATION: ICD-10-CM

## 2024-01-26 DIAGNOSIS — C54.1 ENDOMETRIAL CARCINOMA (HCC): ICD-10-CM

## 2024-01-26 DIAGNOSIS — E11.69 TYPE 2 DIABETES MELLITUS WITH OTHER SPECIFIED COMPLICATION, UNSPECIFIED WHETHER LONG TERM INSULIN USE (HCC): ICD-10-CM

## 2024-01-26 LAB
SL AMB  POCT GLUCOSE, UA: NEGATIVE
SL AMB LEUKOCYTE ESTERASE,UA: ABNORMAL
SL AMB POCT BILIRUBIN,UA: ABNORMAL
SL AMB POCT BLOOD,UA: ABNORMAL
SL AMB POCT CLARITY,UA: ABNORMAL
SL AMB POCT COLOR,UA: YELLOW
SL AMB POCT HEMOGLOBIN AIC: 5.1 (ref ?–6.5)
SL AMB POCT KETONES,UA: NEGATIVE
SL AMB POCT NITRITE,UA: POSITIVE
SL AMB POCT PH,UA: 5.5
SL AMB POCT SPECIFIC GRAVITY,UA: 1.03
SL AMB POCT URINE PROTEIN: ABNORMAL
SL AMB POCT UROBILINOGEN: 1

## 2024-01-26 PROCEDURE — 87086 URINE CULTURE/COLONY COUNT: CPT | Performed by: NURSE PRACTITIONER

## 2024-01-26 PROCEDURE — 90471 IMMUNIZATION ADMIN: CPT

## 2024-01-26 PROCEDURE — 99214 OFFICE O/P EST MOD 30 MIN: CPT | Performed by: NURSE PRACTITIONER

## 2024-01-26 PROCEDURE — 81002 URINALYSIS NONAUTO W/O SCOPE: CPT | Performed by: NURSE PRACTITIONER

## 2024-01-26 PROCEDURE — 90677 PCV20 VACCINE IM: CPT

## 2024-01-26 PROCEDURE — 87186 SC STD MICRODIL/AGAR DIL: CPT | Performed by: NURSE PRACTITIONER

## 2024-01-26 PROCEDURE — 83036 HEMOGLOBIN GLYCOSYLATED A1C: CPT | Performed by: NURSE PRACTITIONER

## 2024-01-26 PROCEDURE — 87077 CULTURE AEROBIC IDENTIFY: CPT | Performed by: NURSE PRACTITIONER

## 2024-01-26 RX ORDER — CLOTRIMAZOLE AND BETAMETHASONE DIPROPIONATE 10; .64 MG/G; MG/G
CREAM TOPICAL 2 TIMES DAILY
Qty: 45 G | Refills: 5 | Status: SHIPPED | OUTPATIENT
Start: 2024-01-26

## 2024-01-26 RX ORDER — SULFAMETHOXAZOLE AND TRIMETHOPRIM 800; 160 MG/1; MG/1
1 TABLET ORAL 2 TIMES DAILY
Qty: 14 TABLET | Refills: 0 | Status: SHIPPED | OUTPATIENT
Start: 2024-01-26 | End: 2024-02-02

## 2024-01-26 RX ORDER — VALSARTAN 160 MG/1
160 TABLET ORAL DAILY
Qty: 30 TABLET | Refills: 5 | Status: SHIPPED | OUTPATIENT
Start: 2024-01-26

## 2024-01-26 NOTE — PROGRESS NOTES
Name: Roma Connor      : 1960      MRN: [id number]  Encounter Provider: KASSANDRA Osborne  Encounter Date: 2024   Encounter department: [facility]    Assessment & Plan     1. Urinary tract infection without hematuria, site unspecified  Comments:  fluids  Orders:  -     POCT urine dip  -     sulfamethoxazole-trimethoprim (BACTRIM DS) 800-160 mg per tablet; Take 1 tablet by mouth 2 (two) times a day for 7 days  -     Urine culture; Future  -     Urine culture    2. Type 2 diabetes mellitus with other specified complication, unspecified whether long term insulin use (HCC)  Comments:  continue same metform  Orders:  -     POCT hemoglobin A1c  -     clotrimazole-betamethasone (LOTRISONE) 1-0.05 % cream; Apply topically 2 (two) times a day    3. Encounter for immunization  -     Pneumococcal Conjugate Vaccine 20-valent (PCV20)    4. Encounter for screening mammogram for breast cancer  Comments:  Treat rash week prior, when she calls to schedule see if she could be fitted with stool or something that would help her be more comfortable during process  Orders:  -     Mammo screening bilateral w 3d & cad; Future; Expected date: 2024    5. Intertrigo  -     clotrimazole-betamethasone (LOTRISONE) 1-0.05 % cream; Apply topically 2 (two) times a day    6. Benign essential hypertension  Comments:  needs less med d/t wt loss,d/c hctz portion Valsart. change to Valsartan alone,send reads  Orders:  -     valsartan (DIOVAN) 160 mg tablet; Take 1 tablet (160 mg total) by mouth daily    7. Lightheaded    8. Endometrial carcinoma (HCC)  Comments:  stable           Subjective      Patient is here for dysuria but mentions a couple of other issues.  Actually back in September she noticed she was a little bit lightheaded so she thought it might be her sugar so she reduced her metformin to once daily.  But actually she is lost about 65 pounds in the last year due to her Ozempic therapy.  She  now seems to get a lot of urinary incontinence in the morning and she thinks that she is getting less edema of the legs and that maybe she does not need the hydrochlorothiazide anymore.  I suspect it was more her blood pressure than the sugar that actually made her lightheaded.  She brings readings today that are about 100/60 these are much lower than what she used to get.  She also had stomach bug a couple days ago so she was a little even more dehydrated from that.  No URI symptoms no chest pain shortness of breath or syncope.  She is pleased with the weight loss she is able to get around better than before she thinks she will probably still need the referral for aqua therapy in the spring which really seems to give her relief.  Was going to do check a positional blood pressure check but we got talking about her daughter's illness as we were doing it and her blood pressure actually increased so I did not think it would be accurate.  But she gave me at least 3 readings of being around 100/60.  And I really think she would benefit from a lowering of the med.  Again we discussed her mammogram.  Again she states she has been avoiding it because it is so uncomfortable for her.  She explains it more in depth today and apparently she gets a lot of chafing and the skin actually sticks to it and causes skin tears when she gets due to what sounds like a intertrigo rash that comes intermittently.  She also has trouble standing and bending the way that she needs to to fit breast into the mammogram machine.    Difficulty Urinating       Review of Systems   Genitourinary:  Positive for dysuria.       Current Outpatient Medications on File Prior to Visit   Medication Sig   • aspirin (ECOTRIN LOW STRENGTH) 81 mg EC tablet Take 81 mg by mouth daily   • Cholecalciferol 10 MCG (400 UNIT) CHEW Take by mouth.   • Diclofenac Sodium (VOLTAREN) 1 % Apply topically every 6 (six) hours   • metFORMIN (GLUCOPHAGE-XR) 500 mg 24 hr tablet  "Patient takes 2 tablets PO with evening meal   • methocarbamol (ROBAXIN) 500 mg tablet Take 500 mg by mouth 4 (four) times a day as needed   • multivitamin (THERAGRAN) TABS Take 1 tablet by mouth daily   • Pain Relief Extra Strength 500 MG tablet take 2 tablets by mouth every 8 hours for 10 days   • Prolensa 0.07 % SOLN instill 1 drop into left eye STARTING WHEN YOU GET HOME FROM SURGERY once daily for 28 DAYS   • semaglutide, 1 mg/dose, (Ozempic, 1 MG/DOSE,) 4 mg/3 mL injection pen INJECT 1MG SUBCUTANEOUSLY ONCE A WEEK ON THE SAME DAY EACH WEEK   • traMADol (ULTRAM) 50 mg tablet Take 50 mg by mouth every 6 (six) hours as needed   • [DISCONTINUED] valsartan-hydrochlorothiazide (DIOVAN-HCT) 160-12.5 MG per tablet Take 1 tablet by mouth daily   • semaglutide, 0.25 or 0.5 mg/dose, (Ozempic) 2 mg/1.5 mL injection pen INJECT 0.5 MG UNDER THE SKIN WEEKLY       Objective     /62 (BP Location: Left arm, Patient Position: Sitting, Cuff Size: Large)   Pulse 96   Temp (!) 97 °F (36.1 °C) (Tympanic)   Ht 6' 1\" (1.854 m)   Wt (!) 155 kg (340 lb 12.8 oz)   SpO2 98%   BMI 44.96 kg/m²     Physical Exam  Constitutional:       Appearance: Normal appearance.   Cardiovascular:      Rate and Rhythm: Normal rate and regular rhythm.   Pulmonary:      Effort: Pulmonary effort is normal.      Breath sounds: Normal breath sounds.   Psychiatric:         Mood and Affect: Mood normal.       KASSANDRA Osborne    "

## 2024-01-28 LAB — BACTERIA UR CULT: ABNORMAL

## 2024-01-29 LAB
BACTERIA UR CULT: ABNORMAL
BACTERIA UR CULT: ABNORMAL

## 2024-02-15 ENCOUNTER — TELEPHONE (OUTPATIENT)
Dept: NEPHROLOGY | Facility: CLINIC | Age: 64
End: 2024-02-15

## 2024-02-15 DIAGNOSIS — I10 ESSENTIAL HYPERTENSION: Primary | ICD-10-CM

## 2024-04-09 ENCOUNTER — TELEPHONE (OUTPATIENT)
Dept: FAMILY MEDICINE CLINIC | Facility: CLINIC | Age: 64
End: 2024-04-09

## 2024-04-16 ENCOUNTER — TELEPHONE (OUTPATIENT)
Dept: FAMILY MEDICINE CLINIC | Facility: CLINIC | Age: 64
End: 2024-04-16

## 2024-04-16 NOTE — TELEPHONE ENCOUNTER
Letter done and Annel Mas signed.  Spoke to patient and she wanted the letter emailed to her.  I emailed it to her at [email or web address].

## 2024-04-19 ENCOUNTER — HOSPITAL ENCOUNTER (OUTPATIENT)
Dept: MAMMOGRAPHY | Facility: HOSPITAL | Age: 64
Discharge: HOME | End: 2024-04-19
Payer: COMMERCIAL

## 2024-04-19 VITALS — HEIGHT: 72 IN | WEIGHT: 293 LBS | BODY MASS INDEX: 39.68 KG/M2

## 2024-04-19 DIAGNOSIS — Z12.31 ENCOUNTER FOR SCREENING MAMMOGRAM FOR BREAST CANCER: ICD-10-CM

## 2024-04-19 PROCEDURE — 77063 BREAST TOMOSYNTHESIS BI: CPT

## 2024-04-19 PROCEDURE — 77067 SCR MAMMO BI INCL CAD: CPT

## 2024-04-22 ENCOUNTER — TELEPHONE (OUTPATIENT)
Dept: FAMILY MEDICINE CLINIC | Facility: CLINIC | Age: 64
End: 2024-04-22

## 2024-04-22 ENCOUNTER — ANCILLARY ORDERS (OUTPATIENT)
Dept: FAMILY MEDICINE CLINIC | Facility: CLINIC | Age: 64
End: 2024-04-22

## 2024-04-22 DIAGNOSIS — R92.8 ABNORMAL MAMMOGRAM: Primary | ICD-10-CM

## 2024-04-22 NOTE — TELEPHONE ENCOUNTER
----- Message from KASSANDRA Osborne sent at 4/22/2024  4:33 PM EDT -----  Defy patient there was an abnormality on her mammogram.  Believes she is already seeing this.  Someone should be contacting her if they did not already about how are going to further examine this.  If she has any questions or issues please let me know

## 2024-04-29 ENCOUNTER — TELEPHONE (OUTPATIENT)
Dept: NEPHROLOGY | Facility: CLINIC | Age: 64
End: 2024-04-29

## 2024-05-01 ENCOUNTER — APPOINTMENT (OUTPATIENT)
Age: 64
End: 2024-05-01
Payer: COMMERCIAL

## 2024-05-01 DIAGNOSIS — I10 ESSENTIAL HYPERTENSION: ICD-10-CM

## 2024-05-01 LAB
CREAT UR-MCNC: 43.3 MG/DL
MICROALBUMIN UR-MCNC: <7 MG/L
MICROALBUMIN/CREAT 24H UR: <16 MG/G CREATININE (ref 0–30)

## 2024-05-01 PROCEDURE — 36415 COLL VENOUS BLD VENIPUNCTURE: CPT

## 2024-05-01 PROCEDURE — 82043 UR ALBUMIN QUANTITATIVE: CPT

## 2024-05-01 PROCEDURE — 80053 COMPREHEN METABOLIC PANEL: CPT

## 2024-05-01 PROCEDURE — 82570 ASSAY OF URINE CREATININE: CPT

## 2024-05-08 LAB
ALBUMIN SERPL BCP-MCNC: 4 G/DL (ref 3.5–5)
ALP SERPL-CCNC: 118 U/L (ref 34–104)
ALT SERPL W P-5'-P-CCNC: 13 U/L (ref 7–52)
ANION GAP SERPL CALCULATED.3IONS-SCNC: 8 MMOL/L (ref 4–13)
AST SERPL W P-5'-P-CCNC: 14 U/L (ref 13–39)
BILIRUB SERPL-MCNC: 0.71 MG/DL (ref 0.2–1)
BUN SERPL-MCNC: 23 MG/DL (ref 5–25)
CALCIUM SERPL-MCNC: 10.1 MG/DL (ref 8.4–10.2)
CHLORIDE SERPL-SCNC: 102 MMOL/L (ref 96–108)
CO2 SERPL-SCNC: 29 MMOL/L (ref 21–32)
CREAT SERPL-MCNC: 0.92 MG/DL (ref 0.6–1.3)
GFR SERPL CREATININE-BSD FRML MDRD: 66 ML/MIN/1.73SQ M
GLUCOSE P FAST SERPL-MCNC: 92 MG/DL (ref 65–99)
POTASSIUM SERPL-SCNC: 4.5 MMOL/L (ref 3.5–5.3)
PROT SERPL-MCNC: 7.8 G/DL (ref 6.4–8.4)
SODIUM SERPL-SCNC: 139 MMOL/L (ref 135–147)

## 2024-05-09 ENCOUNTER — OFFICE VISIT (OUTPATIENT)
Dept: NEPHROLOGY | Facility: CLINIC | Age: 64
End: 2024-05-09
Payer: COMMERCIAL

## 2024-05-09 VITALS
SYSTOLIC BLOOD PRESSURE: 120 MMHG | WEIGHT: 293 LBS | BODY MASS INDEX: 39.68 KG/M2 | DIASTOLIC BLOOD PRESSURE: 68 MMHG | HEIGHT: 72 IN | HEART RATE: 73 BPM | OXYGEN SATURATION: 99 %

## 2024-05-09 DIAGNOSIS — N18.2 STAGE 2 CHRONIC KIDNEY DISEASE: Primary | ICD-10-CM

## 2024-05-09 DIAGNOSIS — I10 ESSENTIAL HYPERTENSION: ICD-10-CM

## 2024-05-09 DIAGNOSIS — E66.01 MORBID OBESITY (HCC): Chronic | ICD-10-CM

## 2024-05-09 DIAGNOSIS — E11.69 TYPE 2 DIABETES MELLITUS WITH OTHER SPECIFIED COMPLICATION, UNSPECIFIED WHETHER LONG TERM INSULIN USE (HCC): ICD-10-CM

## 2024-05-09 PROCEDURE — 99214 OFFICE O/P EST MOD 30 MIN: CPT | Performed by: INTERNAL MEDICINE

## 2024-05-09 NOTE — PROGRESS NOTES
Assessment & Plan:    1. Stage 2 chronic kidney disease  -     Urinalysis with microscopic; Future; Expected date: 04/29/2025  -     Uric acid; Future; Expected date: 04/29/2025  -     Protein / creatinine ratio, urine; Future; Expected date: 04/29/2025  -     PTH, intact; Future; Expected date: 04/29/2025  -     Albumin / creatinine urine ratio; Future; Expected date: 04/29/2025  -     Comprehensive metabolic panel; Future; Expected date: 04/29/2025  -     CBC and differential; Future; Expected date: 04/29/2025    2. Essential hypertension  -     Urinalysis with microscopic; Future; Expected date: 04/29/2025  -     Uric acid; Future; Expected date: 04/29/2025  -     Protein / creatinine ratio, urine; Future; Expected date: 04/29/2025  -     PTH, intact; Future; Expected date: 04/29/2025  -     Albumin / creatinine urine ratio; Future; Expected date: 04/29/2025  -     Comprehensive metabolic panel; Future; Expected date: 04/29/2025  -     CBC and differential; Future; Expected date: 04/29/2025    3. Type 2 diabetes mellitus with other specified complication, unspecified whether long term insulin use (HCC)  -     Urinalysis with microscopic; Future; Expected date: 04/29/2025  -     Uric acid; Future; Expected date: 04/29/2025  -     Protein / creatinine ratio, urine; Future; Expected date: 04/29/2025  -     PTH, intact; Future; Expected date: 04/29/2025  -     Albumin / creatinine urine ratio; Future; Expected date: 04/29/2025  -     Comprehensive metabolic panel; Future; Expected date: 04/29/2025  -     CBC and differential; Future; Expected date: 04/29/2025    4. Morbid obesity (HCC)         CKD 2   Baseline 0.9-1.0. 5/1 Cr 0.92 with eGFR 66 ml/min. Metabolic trends stable.  Volume status appears compensated.  Reviewed CKD stages, Cr and eGFR trends and eGFR meaning in simple terms.  Follow up in 1 year for care.   No changes to medications at present. If eGFR<50ml/min and albuminuria worsening, consider SGLT-2I  use.    2. Essential HTN  Goal BP<130/80. Bp well controlled at present.  Continue valsartan 180mg/day.    3. Type 2 DM, well controlled.  Continue current management with metformin and Ozempic per primary team.   Continue A1C monitoring per primary team.  Follow up proteinuria trends --most recent ratio <16 mg/g cr.    4. Morbid obesity   Encourage weight loss and low sodium diet.    The benefits, risks and alternatives to the treatment plan were discussed at this visit. Patient was advised of common adverse effects of any medical therapies prescribed. All questions were answered and discussed with the patient and any accompanying family members or caretakers.      Subjective:      Patient ID: Roma Connor is a 64 y.o. female presents for follow up in the [place] office for CKD management.     HPI  In the interim since last visit, sp right carpal tunnel repair and now needs trigger finger procedure.  She is hydrating better --64 ounces per day.     She reports dry mouth at night, she sleeps with mouth open. Previously on CPAP.     Denies cp/sob/n/v/d.  Denies dizziness/lightheadedness/falls.  Denies checking BG-. She is in the plateau stage with weight. She is going to see PCP at end of month.  She is on metformin 1000 mg qhs.     She was on HCTZ but stopped due to lightheadedness. She is only on vaslartan only.     Denies recent gout flare.   She has been offf HCTZ. Urci acid 7.4. She had allergy to allopurinol.   She has extrarenal pelvis.       She needs to go for mammogram --23.   The following portions of the patient's history were reviewed and updated as appropriate: allergies, current medications, past family history, past medical history, past social history, past surgical history, and problem list.    Review of Systems   Respiratory: Negative.     Cardiovascular: Negative.    Genitourinary: Negative.    Neurological: Negative.          Objective:      /68 (BP Location: Left arm, Patient  "Position: Sitting, Cuff Size: Large)   Pulse 73   Ht 6' 1\" (1.854 m)   Wt (!) 156 kg (343 lb)   SpO2 99%   BMI 45.25 kg/m²          Physical Exam  Vitals reviewed.   Constitutional:       General: She is not in acute distress.     Appearance: She is obese. She is not ill-appearing.   HENT:      Head: Atraumatic.      Nose: Nose normal.      Mouth/Throat:      Mouth: Mucous membranes are moist.      Pharynx: Oropharynx is clear.   Cardiovascular:      Rate and Rhythm: Normal rate.      Heart sounds:      No friction rub.   Pulmonary:      Breath sounds: No wheezing, rhonchi or rales.   Abdominal:      Tenderness: There is no abdominal tenderness. There is no guarding.   Musculoskeletal:      Right lower leg: No edema.      Left lower leg: No edema.   Skin:     Coloration: Skin is not jaundiced.      Findings: No bruising or rash.      Comments: Chronic venous stasis changes BL LE   Neurological:      General: No focal deficit present.      Mental Status: She is alert and oriented to person, place, and time.      Cranial Nerves: No cranial nerve deficit.   Psychiatric:         Mood and Affect: Mood normal.         Behavior: Behavior normal.             Lab Results   Component Value Date    SODIUM 139 05/01/2024    K 4.5 05/01/2024     05/01/2024    CO2 29 05/01/2024    AGAP 8 05/01/2024    BUN 23 05/01/2024    CREATININE 0.92 05/01/2024    GLUC 104 (H) 11/29/2022    GLUF 92 05/01/2024    CALCIUM 10.1 05/01/2024    AST 14 05/01/2024    ALT 13 05/01/2024    ALKPHOS 118 (H) 05/01/2024    TP 7.8 05/01/2024    TBILI 0.71 05/01/2024    EGFR 66 05/01/2024      Lab Results   Component Value Date    CREATININE 0.92 05/01/2024    CREATININE 1.00 12/21/2023    CREATININE 0.93 09/15/2023    CREATININE 1.00 06/06/2023    CREATININE 0.95 04/11/2023    CREATININE 0.82 11/29/2022    CREATININE 1.04 11/15/2022    CREATININE 0.88 11/12/2022    CREATININE 1.03 06/28/2022    CREATININE 1.03 03/22/2022    CREATININE 1.02 " "07/24/2021    CREATININE 0.69 02/15/2021    CREATININE 0.95 02/14/2021    CREATININE 0.76 09/15/2020    CREATININE 0.78 03/05/2020      Lab Results   Component Value Date    COLORU yellow 01/26/2024    CLARITYU cloudy 01/26/2024    SPECGRAV 1.011 12/21/2023    PHUR 6.0 12/21/2023    LEUKOCYTESUR large 01/26/2024    NITRITE positive 01/26/2024    PROTEIN UA 100mg 01/26/2024    GLUCOSEU negative 01/26/2024    KETONESU negative 01/26/2024    UROBILINOGEN 1.0 01/26/2024    BILIRUBINUR small 01/26/2024    BLOODU moderate 01/26/2024    RBCUA 4-10 (A) 12/21/2023    WBCUA Innumerable (A) 12/21/2023    EPIS Occasional 12/21/2023    BACTERIA Occasional 12/21/2023      No results found for: \"LABPROT\"  No results found for: \"MICROALBUR\", \"[id number]\"  Lab Results   Component Value Date    WBC 8.29 09/15/2023    HGB 15.5 (H) 09/15/2023    HCT 47.0 (H) 09/15/2023    MCV 92 09/15/2023     09/15/2023      Lab Results   Component Value Date    HGB 15.5 (H) 09/15/2023    HGB 14.7 04/11/2023    HGB 14.5 03/22/2022    HGB 14.0 10/23/2019    HGB 14.8 11/29/2018      No results found for: \"IRON\", \"TIBC\", \"FERRITIN\"   No results found for: \"PTHCALCIUM\", \"[id number]\", \"PHOSPHORUS\"   Lab Results   Component Value Date    CHOLESTEROL 218 (H) 04/11/2023    HDL 57 04/11/2023    LDLCALC 129 (H) 04/11/2023    TRIG 161 (H) 04/11/2023      Lab Results   Component Value Date    URICACID 7.4 12/21/2023      Lab Results   Component Value Date    HGBA1C 5.1 01/26/2024      Lab Results   Component Value Date    FREET4 1.16 04/11/2023      Lab Results   Component Value Date    SUBHASH Negative 04/07/2022      Lab Results   Component Value Date    UPEP See Comment 06/06/2023        Portions of the record may have been created with voice recognition software. Occasional wrong word or \"sound a like\" substitutions may have occurred due to the inherent limitations of voice recognition software. Read the chart carefully and recognize, using context, where " substitutions have occurred. If you have any questions, please contact the dictating provider.

## 2024-05-09 NOTE — PATIENT INSTRUCTIONS
Your kidney function is stable at 66%.  Follow up in 1 year.  Avoid routine use of motrin,advil,aleve,ibuprofen.  Hydrate to 2 quart per day.  Encourage heart healthy diabetic diet, low in sodium (2 -3 gram per day).  You do not require initiation of Jardiance or Farxiga as your kidney function is too good still.   Recommend considering decreasing metformin to 500mg a day, you can discuss with PCP.

## 2024-05-20 ENCOUNTER — RA CDI HCC (OUTPATIENT)
Dept: OTHER | Facility: HOSPITAL | Age: 64
End: 2024-05-20

## 2024-05-20 NOTE — PROGRESS NOTES
HCC coding opportunities          Chart Reviewed number of suggestions sent to Provider: 1   E11.36    Patients Insurance        Commercial Insurance: [employer]

## 2024-05-22 DIAGNOSIS — E11.69 TYPE 2 DIABETES MELLITUS WITH OTHER SPECIFIED COMPLICATION, UNSPECIFIED WHETHER LONG TERM INSULIN USE (HCC): ICD-10-CM

## 2024-05-23 ENCOUNTER — HOSPITAL ENCOUNTER (OUTPATIENT)
Dept: MAMMOGRAPHY | Facility: HOSPITAL | Age: 64
Discharge: HOME | End: 2024-05-23
Payer: COMMERCIAL

## 2024-05-23 ENCOUNTER — HOSPITAL ENCOUNTER (OUTPATIENT)
Dept: ULTRASOUND IMAGING | Facility: HOSPITAL | Age: 64
Discharge: HOME | End: 2024-05-23
Payer: COMMERCIAL

## 2024-05-23 VITALS — BODY MASS INDEX: 45.25 KG/M2 | HEIGHT: 72 IN

## 2024-05-23 DIAGNOSIS — R92.8 ABNORMAL MAMMOGRAM: ICD-10-CM

## 2024-05-23 PROCEDURE — G0279 TOMOSYNTHESIS, MAMMO: HCPCS

## 2024-05-23 PROCEDURE — 76642 ULTRASOUND BREAST LIMITED: CPT

## 2024-05-23 PROCEDURE — 77065 DX MAMMO INCL CAD UNI: CPT

## 2024-05-23 RX ORDER — SEMAGLUTIDE 1.34 MG/ML
INJECTION, SOLUTION SUBCUTANEOUS
Qty: 3 ML | Refills: 0 | Status: SHIPPED | OUTPATIENT
Start: 2024-05-23

## 2024-05-28 ENCOUNTER — OFFICE VISIT (OUTPATIENT)
Dept: FAMILY MEDICINE CLINIC | Facility: CLINIC | Age: 64
End: 2024-05-28
Payer: COMMERCIAL

## 2024-05-28 ENCOUNTER — TELEPHONE (OUTPATIENT)
Dept: ADMINISTRATIVE | Facility: OTHER | Age: 64
End: 2024-05-28

## 2024-05-28 VITALS
HEART RATE: 78 BPM | DIASTOLIC BLOOD PRESSURE: 70 MMHG | HEIGHT: 72 IN | OXYGEN SATURATION: 99 % | SYSTOLIC BLOOD PRESSURE: 116 MMHG | WEIGHT: 293 LBS | BODY MASS INDEX: 39.68 KG/M2

## 2024-05-28 DIAGNOSIS — Z78.0 ASYMPTOMATIC UNCOMPLICATED MENOPAUSE: ICD-10-CM

## 2024-05-28 DIAGNOSIS — E11.69 TYPE 2 DIABETES MELLITUS WITH OTHER SPECIFIED COMPLICATION, UNSPECIFIED WHETHER LONG TERM INSULIN USE (HCC): Primary | ICD-10-CM

## 2024-05-28 LAB — SL AMB POCT HEMOGLOBIN AIC: 5 (ref ?–6.5)

## 2024-05-28 PROCEDURE — 99214 OFFICE O/P EST MOD 30 MIN: CPT | Performed by: NURSE PRACTITIONER

## 2024-05-28 PROCEDURE — 83036 HEMOGLOBIN GLYCOSYLATED A1C: CPT | Performed by: NURSE PRACTITIONER

## 2024-05-28 NOTE — PROGRESS NOTES
"Ambulatory Visit  Name: Roma Connor      : 1960      MRN: [id number]  Encounter Provider: KASSANDRA Osborne  Encounter Date: 2024   Encounter department: [facility] PRIMARY CARE    Assessment & Plan   1. Type 2 diabetes mellitus with other specified complication, unspecified whether long term insulin use ([facility])  Comments:  A1c now 5! ok to stop Metformin  Orders:  -     POCT hemoglobin A1c  2. Asymptomatic uncomplicated menopause  -     DXA vertebral fracture assessment; Future; Expected date: 2024  3. BMI 40.0-44.9, adult (HCC)  Comments:  much improved from bmi originally in 60's. when calls for RF in 3 wks, request increase in dose       History of Present Illness   {Disappearing Hyperlinks I Encounters * My Last Note * Since Last Visit * History :71009}  For routine visit.  Last visit we had remove the hydrochlorothiazide for her BP med and she seems to be doing well and is hardly ever lightheaded anymore.  She continues on Ozempic and we did just refill that.  She seems to have plateaued at her weight right now.  She is moving around through physical therapy.  Her mood is good.  She had abnormal mammogram just needs a recheck ultrasound and mammo in 6 months.  She saw her ophthalmologist recently.  She has never had a bone densit,.  Takes 5000 vitamin D daily.  Does not take calcium.  Interested in stopping her metformin if possible it does give her diarrhea occasionally        Review of Systems    Objective   {Disappearing Hyperlinks   Review Vitals * Enter New Vitals * Results Review * Labs * Imaging * Cardiology * Procedures * Lung Cancer Screening :09868}  /70 (BP Location: Left arm, Patient Position: Sitting, Cuff Size: Extra-Large)   Pulse 78   Ht 6' 1\" (1.854 m)   Wt (!) 154 kg (340 lb)   SpO2 99%   BMI 44.86 kg/m²     Physical Exam  Eyes:      Pupils: Pupils are equal, round, and reactive to light.   Neck:      Vascular: No carotid bruit. "   Cardiovascular:      Rate and Rhythm: Normal rate and regular rhythm.      Pulses:           Dorsalis pedis pulses are 2+ on the right side and 2+ on the left side.        Posterior tibial pulses are 2+ on the right side and 2+ on the left side.   Pulmonary:      Effort: Pulmonary effort is normal.      Breath sounds: Normal breath sounds.   Musculoskeletal:        Feet:    Feet:      Right foot:      Skin integrity: Warmth and dry skin present. No ulcer, skin breakdown, erythema or callus.      Left foot:      Skin integrity: Warmth and dry skin present. No ulcer or erythema.     Diabetic Foot Exam    Patient's shoes and socks removed.    Right Foot/Ankle   Right Foot Inspection  Skin Exam: skin normal, skin intact, dry skin, warmth and abnormal color (Slightly dusky). No callus, no erythema, no maceration, no pre-ulcer, no ulcer and no callus.     Toe Exam: No swelling    Sensory   Monofilament testing: intact    Vascular  The right DP pulse is 2+. The right PT pulse is 2+.     Left Foot/Ankle  Left Foot Inspection  Skin Exam: dry skin, warmth and abnormal color (Slightly dusky). No erythema and no ulcer.     Toe Exam: No swelling.     Sensory   Monofilament testing: diminished    Vascular  The left DP pulse is 2+. The left PT pulse is 2+.     Assign Risk Category  Loss of protective sensation          Administrative Statements {Disappearing Hyperlinks I  Level of Service * [id number]}

## 2024-05-28 NOTE — TELEPHONE ENCOUNTER
Upon review of the In Basket request and the patient's chart, initial outreach has been made via fax to facility. Please see Contacts section for details.     Thank you  Gutierrez Gama MA

## 2024-05-28 NOTE — TELEPHONE ENCOUNTER
----- Message from Melissa DYE sent at 5/28/2024  8:56 AM EDT -----  Regarding: eye exam  05/28/24 8:56 AM    Hello, our patient Roma Connor has had Diabetic Eye Exam completed/performed. Please assist in updating the patient chart by making an External outreach to [facility]    [address], [place] [id number] [phone]   facility located in . The date of service is 01/2024.    Thank you,  [facility]

## 2024-05-28 NOTE — LETTER
Diabetic Eye Exam Form    Date Requested: 24  Patient: Roma Connor  Patient : 1960   Referring Provider: KASSANDRA Osborne      DIABETIC Eye Exam Date _______________________________      Type of Exam MUST be documented for Diabetic Eye Exams. Please CHECK ONE.     Retinal Exam       Dilated Retinal Exam       OCT       Optomap-Iris Exam      Fundus Photography       Left Eye - Please check Retinopathy or No Retinopathy        Exam did show retinopathy    Exam did not show retinopathy       Right Eye - Please check Retinopathy or No Retinopathy       Exam did show retinopathy    Exam did not show retinopathy       Comments __________________________________________________________    Practice Providing Exam ______________________________________________    Exam Performed By (print name) _______________________________________      Provider Signature ___________________________________________________      These reports are needed for  compliance.  Please fax this completed form and a copy of the Diabetic Eye Exam report to our office located at [address] [place] [address] as soon as possible via Fax [phone] attention Gutierrez: Phone [phone]  We thank you for your assistance in treating our mutual patient.

## 2024-05-29 NOTE — TELEPHONE ENCOUNTER
Upon review of the In Basket request we were able to locate, review, and update the patient chart as requested for Diabetic Eye Exam.    Any additional questions or concerns should be emailed to the Practice Liaisons via the appropriate education email address, please do not reply via In Basket.    Thank you  Gutierrez Gama MA   [facility]

## 2024-06-06 ENCOUNTER — HOSPITAL ENCOUNTER (OUTPATIENT)
Dept: BONE DENSITY | Facility: HOSPITAL | Age: 64
End: 2024-06-06
Payer: COMMERCIAL

## 2024-06-06 VITALS — BODY MASS INDEX: 39.68 KG/M2 | WEIGHT: 293 LBS | HEIGHT: 72 IN

## 2024-06-06 DIAGNOSIS — Z78.0 ASYMPTOMATIC UNCOMPLICATED MENOPAUSE: ICD-10-CM

## 2024-06-06 PROCEDURE — 77080 DXA BONE DENSITY AXIAL: CPT

## 2024-06-17 DIAGNOSIS — E11.69 TYPE 2 DIABETES MELLITUS WITH OTHER SPECIFIED COMPLICATION, UNSPECIFIED WHETHER LONG TERM INSULIN USE (HCC): Primary | ICD-10-CM

## 2024-06-17 DIAGNOSIS — E11.69 TYPE 2 DIABETES MELLITUS WITH OTHER SPECIFIED COMPLICATION, UNSPECIFIED WHETHER LONG TERM INSULIN USE (HCC): ICD-10-CM

## 2024-06-17 NOTE — TELEPHONE ENCOUNTER
Please send a new prescription for the increased dose of Ozempic to [facility]. A 3 month subscription is acceptable.  [facility] - [address] [address] [phone]

## 2024-07-18 ENCOUNTER — TELEPHONE (OUTPATIENT)
Dept: NEPHROLOGY | Facility: CLINIC | Age: 64
End: 2024-07-18

## 2024-07-19 DIAGNOSIS — I10 BENIGN ESSENTIAL HYPERTENSION: ICD-10-CM

## 2024-07-19 RX ORDER — VALSARTAN 160 MG/1
160 TABLET ORAL DAILY
Qty: 30 TABLET | Refills: 5 | Status: SHIPPED | OUTPATIENT
Start: 2024-07-19

## 2024-08-08 ENCOUNTER — TELEPHONE (OUTPATIENT)
Dept: FAMILY MEDICINE CLINIC | Facility: CLINIC | Age: 64
End: 2024-08-08

## 2024-08-23 DIAGNOSIS — N63.10 MASS OF RIGHT BREAST, UNSPECIFIED QUADRANT: Primary | ICD-10-CM

## 2024-08-26 DIAGNOSIS — E11.69 TYPE 2 DIABETES MELLITUS WITH OTHER SPECIFIED COMPLICATION, UNSPECIFIED WHETHER LONG TERM INSULIN USE (HCC): ICD-10-CM

## 2024-12-09 ENCOUNTER — RA CDI HCC (OUTPATIENT)
Dept: OTHER | Facility: HOSPITAL | Age: 64
End: 2024-12-09

## 2024-12-10 ENCOUNTER — APPOINTMENT (OUTPATIENT)
Age: 64
End: 2024-12-10
Payer: COMMERCIAL

## 2024-12-10 DIAGNOSIS — E11.69 TYPE 2 DIABETES MELLITUS WITH OTHER SPECIFIED COMPLICATION, UNSPECIFIED WHETHER LONG TERM INSULIN USE (HCC): ICD-10-CM

## 2024-12-10 DIAGNOSIS — E11.69 TYPE 2 DIABETES MELLITUS WITH OTHER SPECIFIED COMPLICATION, UNSPECIFIED WHETHER LONG TERM INSULIN USE (HCC): Primary | ICD-10-CM

## 2024-12-10 PROCEDURE — 83036 HEMOGLOBIN GLYCOSYLATED A1C: CPT

## 2024-12-10 PROCEDURE — 36415 COLL VENOUS BLD VENIPUNCTURE: CPT

## 2024-12-11 LAB
EST. AVERAGE GLUCOSE BLD GHB EST-MCNC: 100 MG/DL
HBA1C MFR BLD: 5.1 %

## 2025-01-03 ENCOUNTER — RA CDI HCC (OUTPATIENT)
Dept: OTHER | Facility: HOSPITAL | Age: 65
End: 2025-01-03

## 2025-01-10 DIAGNOSIS — I10 BENIGN ESSENTIAL HYPERTENSION: ICD-10-CM

## 2025-01-13 RX ORDER — VALSARTAN 160 MG/1
160 TABLET ORAL DAILY
Qty: 30 TABLET | Refills: 5 | Status: SHIPPED | OUTPATIENT
Start: 2025-01-13

## 2025-04-15 ENCOUNTER — HOSPITAL ENCOUNTER (OUTPATIENT)
Dept: ULTRASOUND IMAGING | Facility: HOSPITAL | Age: 65
Discharge: HOME | End: 2025-04-15
Payer: MEDICARE

## 2025-04-15 ENCOUNTER — HOSPITAL ENCOUNTER (OUTPATIENT)
Dept: MAMMOGRAPHY | Facility: HOSPITAL | Age: 65
Discharge: HOME | End: 2025-04-15
Payer: MEDICARE

## 2025-04-15 DIAGNOSIS — N63.10 MASS OF RIGHT BREAST, UNSPECIFIED QUADRANT: ICD-10-CM

## 2025-04-15 PROCEDURE — G0279 TOMOSYNTHESIS, MAMMO: HCPCS

## 2025-04-15 PROCEDURE — 77066 DX MAMMO INCL CAD BI: CPT

## 2025-04-15 PROCEDURE — 76642 ULTRASOUND BREAST LIMITED: CPT

## 2025-05-07 ENCOUNTER — TELEPHONE (OUTPATIENT)
Dept: NEPHROLOGY | Facility: CLINIC | Age: 65
End: 2025-05-07

## 2025-05-07 NOTE — TELEPHONE ENCOUNTER
I called and left a VM for Roma Rodriguez regarding completing blood/urine labs prior to upcoming appt on 05/14/2025

## 2025-05-12 ENCOUNTER — APPOINTMENT (OUTPATIENT)
Age: 65
End: 2025-05-12
Attending: INTERNAL MEDICINE
Payer: MEDICARE

## 2025-05-12 DIAGNOSIS — E11.69 TYPE 2 DIABETES MELLITUS WITH OTHER SPECIFIED COMPLICATION, UNSPECIFIED WHETHER LONG TERM INSULIN USE (HCC): ICD-10-CM

## 2025-05-12 DIAGNOSIS — M1A.9XX0 CHRONIC GOUT WITHOUT TOPHUS, UNSPECIFIED CAUSE, UNSPECIFIED SITE: ICD-10-CM

## 2025-05-12 DIAGNOSIS — E83.52 HYPERCALCEMIA: ICD-10-CM

## 2025-05-12 DIAGNOSIS — N18.2 STAGE 2 CHRONIC KIDNEY DISEASE: ICD-10-CM

## 2025-05-12 DIAGNOSIS — I10 ESSENTIAL HYPERTENSION: ICD-10-CM

## 2025-05-12 DIAGNOSIS — I10 ESSENTIAL HYPERTENSION: Primary | ICD-10-CM

## 2025-05-12 LAB
ALBUMIN SERPL BCG-MCNC: 3.9 G/DL (ref 3.5–5)
ALP SERPL-CCNC: 138 U/L (ref 34–104)
ALT SERPL W P-5'-P-CCNC: 12 U/L (ref 7–52)
ANION GAP SERPL CALCULATED.3IONS-SCNC: 12 MMOL/L (ref 4–13)
AST SERPL W P-5'-P-CCNC: 14 U/L (ref 13–39)
BASOPHILS # BLD AUTO: 0.07 THOUSANDS/ÂΜL (ref 0–0.1)
BASOPHILS NFR BLD AUTO: 1 % (ref 0–1)
BILIRUB SERPL-MCNC: 1.16 MG/DL (ref 0.2–1)
BUN SERPL-MCNC: 21 MG/DL (ref 5–25)
CALCIUM SERPL-MCNC: 9.8 MG/DL (ref 8.4–10.2)
CHLORIDE SERPL-SCNC: 103 MMOL/L (ref 96–108)
CO2 SERPL-SCNC: 26 MMOL/L (ref 21–32)
CREAT SERPL-MCNC: 0.88 MG/DL (ref 0.6–1.3)
CREAT UR-MCNC: 46.6 MG/DL
CREAT UR-MCNC: 46.6 MG/DL
EOSINOPHIL # BLD AUTO: 0.15 THOUSAND/ÂΜL (ref 0–0.61)
EOSINOPHIL NFR BLD AUTO: 2 % (ref 0–6)
ERYTHROCYTE [DISTWIDTH] IN BLOOD BY AUTOMATED COUNT: 13.6 % (ref 11.6–15.1)
GFR SERPL CREATININE-BSD FRML MDRD: 69 ML/MIN/1.73SQ M
GLUCOSE P FAST SERPL-MCNC: 101 MG/DL (ref 65–99)
HCT VFR BLD AUTO: 47.1 % (ref 34.8–46.1)
HGB BLD-MCNC: 15.5 G/DL (ref 11.5–15.4)
IMM GRANULOCYTES # BLD AUTO: 0.01 THOUSAND/UL (ref 0–0.2)
IMM GRANULOCYTES NFR BLD AUTO: 0 % (ref 0–2)
LYMPHOCYTES # BLD AUTO: 2.61 THOUSANDS/ÂΜL (ref 0.6–4.47)
LYMPHOCYTES NFR BLD AUTO: 40 % (ref 14–44)
MCH RBC QN AUTO: 30.3 PG (ref 26.8–34.3)
MCHC RBC AUTO-ENTMCNC: 32.9 G/DL (ref 31.4–37.4)
MCV RBC AUTO: 92 FL (ref 82–98)
MICROALBUMIN UR-MCNC: <7 MG/L
MONOCYTES # BLD AUTO: 0.42 THOUSAND/ÂΜL (ref 0.17–1.22)
MONOCYTES NFR BLD AUTO: 7 % (ref 4–12)
NEUTROPHILS # BLD AUTO: 3.25 THOUSANDS/ÂΜL (ref 1.85–7.62)
NEUTS SEG NFR BLD AUTO: 50 % (ref 43–75)
NRBC BLD AUTO-RTO: 0 /100 WBCS
PLATELET # BLD AUTO: 248 THOUSANDS/UL (ref 149–390)
PMV BLD AUTO: 9.5 FL (ref 8.9–12.7)
POTASSIUM SERPL-SCNC: 4.3 MMOL/L (ref 3.5–5.3)
PROT SERPL-MCNC: 7.7 G/DL (ref 6.4–8.4)
PROT UR-MCNC: <4 MG/DL
PTH-INTACT SERPL-MCNC: 86 PG/ML (ref 12–88)
RBC # BLD AUTO: 5.11 MILLION/UL (ref 3.81–5.12)
SODIUM SERPL-SCNC: 141 MMOL/L (ref 135–147)
URATE SERPL-MCNC: 7.8 MG/DL (ref 2–7.5)
WBC # BLD AUTO: 6.51 THOUSAND/UL (ref 4.31–10.16)

## 2025-05-12 PROCEDURE — 80053 COMPREHEN METABOLIC PANEL: CPT

## 2025-05-12 PROCEDURE — 82043 UR ALBUMIN QUANTITATIVE: CPT

## 2025-05-12 PROCEDURE — 83970 ASSAY OF PARATHORMONE: CPT

## 2025-05-12 PROCEDURE — 82570 ASSAY OF URINE CREATININE: CPT

## 2025-05-12 PROCEDURE — 84156 ASSAY OF PROTEIN URINE: CPT

## 2025-05-12 PROCEDURE — 84550 ASSAY OF BLOOD/URIC ACID: CPT

## 2025-05-12 PROCEDURE — 81001 URINALYSIS AUTO W/SCOPE: CPT

## 2025-05-12 PROCEDURE — 85025 COMPLETE CBC W/AUTO DIFF WBC: CPT

## 2025-05-12 PROCEDURE — 36415 COLL VENOUS BLD VENIPUNCTURE: CPT

## 2025-05-12 RX ORDER — SEMAGLUTIDE 2.68 MG/ML
INJECTION, SOLUTION SUBCUTANEOUS
Qty: 9 ML | Refills: 0 | Status: SHIPPED | OUTPATIENT
Start: 2025-05-12

## 2025-05-13 ENCOUNTER — RESULTS FOLLOW-UP (OUTPATIENT)
Dept: OTHER | Facility: HOSPITAL | Age: 65
End: 2025-05-13

## 2025-05-13 LAB
BACTERIA UR QL AUTO: NORMAL /HPF
BILIRUB UR QL STRIP: NEGATIVE
CLARITY UR: CLEAR
COLOR UR: NORMAL
GLUCOSE UR STRIP-MCNC: NEGATIVE MG/DL
HGB UR QL STRIP.AUTO: NEGATIVE
KETONES UR STRIP-MCNC: NEGATIVE MG/DL
LEUKOCYTE ESTERASE UR QL STRIP: NEGATIVE
NITRITE UR QL STRIP: NEGATIVE
NON-SQ EPI CELLS URNS QL MICRO: NORMAL /HPF
PH UR STRIP.AUTO: 6.5 [PH]
PROT UR STRIP-MCNC: NEGATIVE MG/DL
RBC #/AREA URNS AUTO: NORMAL /HPF
SP GR UR STRIP.AUTO: 1.01 (ref 1–1.03)
UROBILINOGEN UR STRIP-ACNC: <2 MG/DL
WBC #/AREA URNS AUTO: NORMAL /HPF

## 2025-05-14 ENCOUNTER — OFFICE VISIT (OUTPATIENT)
Dept: NEPHROLOGY | Facility: CLINIC | Age: 65
End: 2025-05-14
Payer: MEDICARE

## 2025-05-14 VITALS
HEART RATE: 93 BPM | WEIGHT: 293 LBS | TEMPERATURE: 96.9 F | HEIGHT: 72 IN | BODY MASS INDEX: 39.68 KG/M2 | DIASTOLIC BLOOD PRESSURE: 74 MMHG | SYSTOLIC BLOOD PRESSURE: 118 MMHG | OXYGEN SATURATION: 98 %

## 2025-05-14 DIAGNOSIS — E11.69 TYPE 2 DIABETES MELLITUS WITH OTHER SPECIFIED COMPLICATION, UNSPECIFIED WHETHER LONG TERM INSULIN USE (HCC): Primary | ICD-10-CM

## 2025-05-14 DIAGNOSIS — N18.2 STAGE 2 CHRONIC KIDNEY DISEASE: ICD-10-CM

## 2025-05-14 DIAGNOSIS — E66.01 MORBID OBESITY (HCC): ICD-10-CM

## 2025-05-14 DIAGNOSIS — R74.8 ELEVATED ALKALINE PHOSPHATASE LEVEL: ICD-10-CM

## 2025-05-14 DIAGNOSIS — Z87.39 HISTORY OF GOUT: ICD-10-CM

## 2025-05-14 DIAGNOSIS — I10 PRIMARY HYPERTENSION: ICD-10-CM

## 2025-05-14 PROCEDURE — 99214 OFFICE O/P EST MOD 30 MIN: CPT | Performed by: INTERNAL MEDICINE

## 2025-05-14 NOTE — PROGRESS NOTES
Assessment & Plan:    1. Type 2 diabetes mellitus with other specified complication, unspecified whether long term insulin use (HCC)  -     Urinalysis with microscopic; Future; Expected date: 05/15/2026  -     PTH, intact; Future; Expected date: 05/15/2026  -     Albumin / creatinine urine ratio; Future; Expected date: 05/15/2026  -     Comprehensive metabolic panel; Future; Expected date: 05/15/2026  -     CBC and differential; Future; Expected date: 05/15/2026  -     Magnesium; Future; Expected date: 05/15/2026  -     Phosphorus; Future; Expected date: 05/15/2026  -     Uric acid; Future; Expected date: 05/15/2026  2. Primary hypertension  -     Urinalysis with microscopic; Future; Expected date: 05/15/2026  -     PTH, intact; Future; Expected date: 05/15/2026  -     Albumin / creatinine urine ratio; Future; Expected date: 05/15/2026  -     Comprehensive metabolic panel; Future; Expected date: 05/15/2026  -     CBC and differential; Future; Expected date: 05/15/2026  -     Magnesium; Future; Expected date: 05/15/2026  -     Phosphorus; Future; Expected date: 05/15/2026  -     Uric acid; Future; Expected date: 05/15/2026  3. Stage 2 chronic kidney disease  -     Urinalysis with microscopic; Future; Expected date: 05/15/2026  -     PTH, intact; Future; Expected date: 05/15/2026  -     Albumin / creatinine urine ratio; Future; Expected date: 05/15/2026  -     Comprehensive metabolic panel; Future; Expected date: 05/15/2026  -     CBC and differential; Future; Expected date: 05/15/2026  -     Magnesium; Future; Expected date: 05/15/2026  -     Phosphorus; Future; Expected date: 05/15/2026  -     Uric acid; Future; Expected date: 05/15/2026  4. Elevated alkaline phosphatase level  -     PTH, intact; Future; Expected date: 05/15/2026  -     CBC and differential; Future; Expected date: 05/15/2026  5. Morbid obesity (HCC)  6. History of gout  -     Uric acid; Future; Expected date: 05/15/2026         Type 2 DM  A1C in  nondiabetic range on Ozempic. Reached out to PCP to discuss transition of Mounjaro therapy to help with weight loss component for cardiovascular benefit as well.    2. Primary HTN  BP well controlled, no changes to current regimen.    3. CKD2   Reviewed Cr/eGFR trends. Metabolic and BP well controlled.  Discussed alk phos and workup as well as diabetic management and Mounjaro.  No renal imaging ordered since 6/20/23, reviewed with extrarenal pelvis, anatomic variant. No concern for LUTS at present.  Cr stable in 0.9-1.0 range.5/12/25 Cr 0.88 with eGFR 69 ml/min.   Fu in 1 year with labs prior.  Hgb ULN, if trends up further, consider hematology eval. Denies tobacco use.     4. Elevated alk phos and mildly elevated T bili  Discussed with PCP, consider RUQ US.   PTH normal at 86. If negative, could consider bone specific alk phos testing.     5. Morbid obesity  Consider transition of Ozempic to Mounjaro as weight loss has leveled off for 8 months. Reached out to PCP to discuss.     6. History of gout  Allergy to allopurinol in the past. Reports flare a couple months ago, she is using dietary modification and satisfied with this. Could consider alternative therapy options if gout is recurrent issue.   Uric acid also can be elevated in setting of CKD.      The benefits, risks and alternatives to the treatment plan were discussed at this visit. Patient was advised of common adverse effects of any medical therapies prescribed. All questions were answered and discussed with the patient and any accompanying family members or caretakers.      Subjective:      Patient ID: Roma Connor is a 65 y.o. female presents for follow up in the [place] office for CKD management.     HPI  Sp carpal tunnel surgery in January.    A1C good--A1C 5.1.does not check BG. She is off metformin now, on Ozempic 2mg SQ weekly.. Weight loss is a goal, unchanged weight on Ozempic therapy.    BP well controlled when intermittently checks. She is  on vaslartan 160mg/day.    Uric acid slightly high and had gout sometimes in the winter. She is diet managed. Did not tolerate allopurinol.    Most recent renal US- prominent extrarenal pelvis, no mass,hydro.    The following portions of the patient's history were reviewed and updated as appropriate: allergies, current medications, past family history, past medical history, past social history, past surgical history, and problem list.    Review of Systems   Respiratory: Negative.     Cardiovascular: Negative.    Gastrointestinal: Negative.    Genitourinary: Negative.    Neurological: Negative.    All other systems reviewed and are negative.        Objective:      /74 (BP Location: Left arm, Patient Position: Sitting, Cuff Size: Large)   Pulse 93   Temp (!) 96.9 °F (36.1 °C)   Ht 6' (1.829 m)   Wt (!) 150 kg (331 lb)   SpO2 98%   BMI 44.89 kg/m²          Physical Exam  Vitals reviewed.   Constitutional:       General: She is not in acute distress.     Appearance: She is obese.   HENT:      Nose: Nose normal.      Mouth/Throat:      Mouth: Mucous membranes are moist.      Pharynx: Oropharynx is clear.     Cardiovascular:      Rate and Rhythm: Normal rate and regular rhythm.      Heart sounds:      No friction rub.   Pulmonary:      Breath sounds: Normal breath sounds. No wheezing or rales.   Abdominal:      Tenderness: There is no abdominal tenderness. There is no guarding.     Musculoskeletal:      Right lower leg: No edema.      Left lower leg: No edema.     Skin:     Coloration: Skin is not jaundiced.      Findings: No bruising.     Neurological:      General: No focal deficit present.      Mental Status: She is alert and oriented to person, place, and time.      Cranial Nerves: No cranial nerve deficit.     Psychiatric:         Mood and Affect: Mood normal.         Behavior: Behavior normal.             Lab Results   Component Value Date    SODIUM 141 05/12/2025    K 4.3 05/12/2025     05/12/2025  "   CO2 26 05/12/2025    AGAP 12 05/12/2025    BUN 21 05/12/2025    CREATININE 0.88 05/12/2025    GLUC 104 (H) 11/29/2022    GLUF 101 (H) 05/12/2025    CALCIUM 9.8 05/12/2025    AST 14 05/12/2025    ALT 12 05/12/2025    ALKPHOS 138 (H) 05/12/2025    TP 7.7 05/12/2025    TBILI 1.16 (H) 05/12/2025    EGFR 69 05/12/2025      Lab Results   Component Value Date    CREATININE 0.88 05/12/2025    CREATININE 0.92 05/01/2024    CREATININE 1.00 12/21/2023    CREATININE 0.93 09/15/2023    CREATININE 1.00 06/06/2023    CREATININE 0.95 04/11/2023    CREATININE 0.82 11/29/2022    CREATININE 1.04 11/15/2022    CREATININE 0.88 11/12/2022    CREATININE 1.03 06/28/2022    CREATININE 1.03 03/22/2022    CREATININE 1.02 07/24/2021    CREATININE 0.69 02/15/2021    CREATININE 0.95 02/14/2021    CREATININE 0.76 09/15/2020      Lab Results   Component Value Date    COLORU Light Yellow 05/12/2025    CLARITYU Clear 05/12/2025    SPECGRAV 1.011 05/12/2025    PHUR 6.5 05/12/2025    LEUKOCYTESUR Negative 05/12/2025    NITRITE Negative 05/12/2025    PROTEIN UA Negative 05/12/2025    GLUCOSEU Negative 05/12/2025    KETONESU Negative 05/12/2025    UROBILINOGEN <2.0 05/12/2025    BILIRUBINUR Negative 05/12/2025    BLOODU Negative 05/12/2025    RBCUA None Seen 05/12/2025    WBCUA None Seen 05/12/2025    EPIS Occasional 05/12/2025    BACTERIA None Seen 05/12/2025      No results found for: \"LABPROT\"  No results found for: \"MICROALBUR\", \"[id number]\"  Lab Results   Component Value Date    WBC 6.51 05/12/2025    HGB 15.5 (H) 05/12/2025    HCT 47.1 (H) 05/12/2025    MCV 92 05/12/2025     05/12/2025      Lab Results   Component Value Date    HGB 15.5 (H) 05/12/2025    HGB 15.5 (H) 09/15/2023    HGB 14.7 04/11/2023    HGB 14.5 03/22/2022    HGB 14.0 10/23/2019      No results found for: \"IRON\", \"TIBC\", \"FERRITIN\"   No results found for: \"PTHCALCIUM\", \"[id number]\", \"PHOSPHORUS\"   Lab Results   Component Value Date    CHOLESTEROL 218 (H) 04/11/2023    " "HDL 57 04/11/2023    LDLCALC 129 (H) 04/11/2023    TRIG 161 (H) 04/11/2023      Lab Results   Component Value Date    URICACID 7.8 (H) 05/12/2025      Lab Results   Component Value Date    HGBA1C 5.1 12/10/2024      Lab Results   Component Value Date    FREET4 1.16 04/11/2023      Lab Results   Component Value Date    SUBHASH Negative 04/07/2022      Lab Results   Component Value Date    UPEP See Comment 06/06/2023        Portions of the record may have been created with voice recognition software. Occasional wrong word or \"sound a like\" substitutions may have occurred due to the inherent limitations of voice recognition software. Read the chart carefully and recognize, using context, where substitutions have occurred. If you have any questions, please contact the dictating provider.      "

## 2025-05-15 NOTE — PATIENT INSTRUCTIONS
Thanks for coming in today. We discussed potential transition to Mounjaro to help with weight loss, encourage conversation with your PCP about appropriateness of therapy.  Your alkaline phosphatase is elevated, encourage discussion with your PCP about this .  Your kidney function is stable at 69%.

## 2025-05-21 ENCOUNTER — RA CDI HCC (OUTPATIENT)
Dept: OTHER | Facility: HOSPITAL | Age: 65
End: 2025-05-21

## 2025-06-05 ENCOUNTER — OFFICE VISIT (OUTPATIENT)
Dept: FAMILY MEDICINE CLINIC | Facility: CLINIC | Age: 65
End: 2025-06-05
Payer: MEDICARE

## 2025-06-05 VITALS
SYSTOLIC BLOOD PRESSURE: 130 MMHG | BODY MASS INDEX: 39.68 KG/M2 | OXYGEN SATURATION: 98 % | HEIGHT: 72 IN | TEMPERATURE: 98 F | HEART RATE: 65 BPM | DIASTOLIC BLOOD PRESSURE: 78 MMHG | WEIGHT: 293 LBS

## 2025-06-05 DIAGNOSIS — R94.5 NONSPECIFIC ABNORMAL RESULTS OF LIVER FUNCTION STUDY: ICD-10-CM

## 2025-06-05 DIAGNOSIS — Z01.00 ENCOUNTER FOR VISION SCREENING: ICD-10-CM

## 2025-06-05 DIAGNOSIS — K76.0 NAFLD (NONALCOHOLIC FATTY LIVER DISEASE): ICD-10-CM

## 2025-06-05 DIAGNOSIS — C54.1 ENDOMETRIAL CARCINOMA (HCC): ICD-10-CM

## 2025-06-05 DIAGNOSIS — Z00.00 WELCOME TO MEDICARE PREVENTIVE VISIT: Primary | ICD-10-CM

## 2025-06-05 PROCEDURE — 99173 VISUAL ACUITY SCREEN: CPT | Performed by: NURSE PRACTITIONER

## 2025-06-05 PROCEDURE — G0402 INITIAL PREVENTIVE EXAM: HCPCS | Performed by: NURSE PRACTITIONER

## 2025-06-05 PROCEDURE — 99213 OFFICE O/P EST LOW 20 MIN: CPT | Performed by: NURSE PRACTITIONER

## 2025-06-05 NOTE — PROGRESS NOTES
Name: Roma Connor      : 1960      MRN: [id number]  Encounter Provider: KASSANDRA Osborne  Encounter Date: 2025   Encounter department: [facility] PRIMARY CARE  :  Assessment & Plan  Welcome to Medicare preventive visit         Encounter for vision screening    Orders:  •  Visual acuity screening  Reminded to see ophthalmology 2 times year  Nonspecific abnormal results of liver function study  Will check ultrasound  Orders:  •  US abdomen complete; Future    NAFLD (nonalcoholic fatty liver disease)  Will decide on date of follow-up labs when I see her ultrasound results  Orders:  •  US abdomen complete; Future    Endometrial carcinoma (HCC)  Stable          Preventive health issues were discussed with patient, and age appropriate screening tests were ordered as noted in patient's After Visit Summary. Personalized health advice and appropriate referrals for health education or preventive services given if needed, as noted in patient's After Visit Summary.    History of Present Illness     Here for annual well.  Doing well.  Has lost a little more weight on the Ozempic but basically plateaued.  Overall feels good A1c is stable.  She is able to walk a little more.  And she was happy that she was able to walk to the stands to sit down at a local park when she took her grandson.  She is enjoying [facility].  Denies major issue at present.  Now that she is switching to Medicare she will be having a switch in her formulary and she is unsure what the coverage would be for Ozempic or valsartan.  She has about a 3-month extra supply due to automatic refills of both meds.  Only abnormality in labs is mildly elevated alk phos and a mildly elevated bili.  Those would be mild new findings for her.  She does have a remote history of NAFLD.  Has been many years since we ultrasounded her abdomen her LFT blood tests have been in normal range.  She does not drink alcohol.  She has a history of  cholecystectomy many years ago.  She denies abdominal pain nausea or vomiting.  Also denies diarrhea.  Apparently she has a remote history of having a positive screening test for hep C and this happened probably over 25 years ago.  She had received plasma during a procedure again almost 30 years ago.  Subsequent testing ruled out an actual hepatitis C infection.  This is her stated history, we do not have her previous records from the other office.       Patient Care Team:  KASSANDRA Osborne as PCP - General (Nurse Practitioner)  OK Lua MD as Endoscopist  Miguel Dorado MD (Pain Medicine)  Barbara Kocher, CRNP (Nurse Practitioner)  Cindy Coronado DO (Nephrology)    Review of Systems  Medical History Reviewed by provider this encounter:       Annual Wellness Visit Questionnaire   Roma is here for her Welcome to Medicare visit.     Health Risk Assessment:   Patient rates overall health as good. Patient feels that their physical health rating is slightly better. Patient is satisfied with their life. Eyesight was rated as same. Hearing was rated as same. Patient feels that their emotional and mental health rating is same. Patients states they are never, rarely angry. Patient states they are sometimes unusually tired/fatigued. Pain experienced in the last 7 days has been some. Patient's pain rating has been 3/10. Patient states that she has experienced no weight loss or gain in last 6 months.     Depression Screening:   PHQ-2 Score: 0      Fall Risk Screening:   In the past year, patient has experienced: no history of falling in past year      Urinary Incontinence Screening:   Patient has leaked urine accidently in the last six months.     Home Safety:  Patient has trouble with stairs inside or outside of their home. Patient has working smoke alarms and has working carbon monoxide detector. Home safety hazards include: uneven floors.     Nutrition:   Current diet is Diabetic, No Added Salt and  Limited junk food.     Medications:   Patient is not currently taking any over-the-counter supplements. Patient is able to manage medications.     Activities of Daily Living (ADLs)/Instrumental Activities of Daily Living (IADLs):   Walk and transfer into and out of bed and chair?: Yes  Dress and groom yourself?: Yes    Bathe or shower yourself?: Yes    Feed yourself? Yes  Do your laundry/housekeeping?: Yes  Manage your money, pay your bills and track your expenses?: Yes  Make your own meals?: Yes    Do your own shopping?: No    Previous Hospitalizations:   Any hospitalizations or ED visits within the last 12 months?: No      Advance Care Planning:   Living will: Yes    Durable POA for healthcare: Yes    Advanced directive: Yes      Preventive Screenings      Cardiovascular Screening:    General: Screening Current      Diabetes Screening:     General: Screening Not Indicated and History Diabetes      Colorectal Cancer Screening:     General: Screening Current      Breast Cancer Screening:     General: Screening Current      Cervical Cancer Screening:    General: Screening Not Indicated      Lung Cancer Screening:     General: Screening Not Indicated    Screening, Brief Intervention, and Referral to Treatment (SBIRT)     Screening  Typical number of drinks in a day: 0  Typical number of drinks in a week: 0  Interpretation: Low risk drinking behavior.    AUDIT-C Screenin) How often did you have a drink containing alcohol in the past year? never  2) How many drinks did you have on a typical day when you were drinking in the past year? 0  3) How often did you have 6 or more drinks on one occasion in the past year? never    AUDIT-C Score: 0  Interpretation: Score 0-2 (female): Negative screen for alcohol misuse    Single Item Drug Screening:  How often have you used an illegal drug (including marijuana) or a prescription medication for non-medical reasons in the past year? never    Single Item Drug Screen Score:  0  Interpretation: Negative screen for possible drug use disorder    Social Drivers of Health     Food Insecurity: No Food Insecurity (6/5/2025)    Nursing - Inadequate Food Risk Classification    • Worried About Running Out of Food in the Last Year: Never true    • Ran Out of Food in the Last Year: Never true   Transportation Needs: No Transportation Needs (6/5/2025)    PRAPARE - Transportation    • Lack of Transportation (Medical): No    • Lack of Transportation (Non-Medical): No   Housing Stability: Low Risk  (6/5/2025)    Housing Stability Vital Sign    • Unable to Pay for Housing in the Last Year: No    • Number of Times Moved in the Last Year: 0    • Homeless in the Last Year: No   Utilities: Not At Risk (6/5/2025)    [facility] Utilities    • Threatened with loss of utilities: No     Vision Screening    Right eye Left eye Both eyes   Without correction      With correction 20/20 20/20 20/15       Objective   /78 (BP Location: Right arm, Patient Position: Sitting, Cuff Size: Thigh)   Pulse 65   Temp 98 °F (36.7 °C) (Tympanic)   Ht 6' (1.829 m)   Wt (!) 151 kg (333 lb)   SpO2 98%   BMI 45.16 kg/m²     Physical Exam    Cardiovascular:      Rate and Rhythm: Normal rate and regular rhythm.      Pulses: no weak pulses.           Dorsalis pedis pulses are 2+ on the right side and 2+ on the left side.        Posterior tibial pulses are 2+ on the right side and 2+ on the left side.   Pulmonary:      Effort: Pulmonary effort is normal.      Breath sounds: Normal breath sounds.   Abdominal:      Palpations: Abdomen is soft.     Musculoskeletal:      Right lower leg: No edema.      Left lower leg: No edema.   Feet:      Right foot:      Skin integrity: No ulcer, skin breakdown, erythema, warmth, callus or dry skin.      Left foot:      Skin integrity: No ulcer, skin breakdown, erythema, warmth, callus or dry skin.     Skin:     Comments: She did have slightly diminished sensation in her 4th and 5th left toe she  states she has this on the dorsum of her foot really related to radiculopathy from her spine     Neurological:      Mental Status: She is alert.     Psychiatric:         Mood and Affect: Mood normal.     Diabetic Foot Exam    Patient's shoes and socks removed.    Right Foot/Ankle   Right Foot Inspection  Skin Exam: skin normal and skin intact. No dry skin, no warmth, no callus, no erythema, no maceration, no abnormal color, no pre-ulcer, no ulcer and no callus.     Toe Exam: ROM and strength within normal limits. No swelling, no tenderness, erythema and  no right toe deformity    Sensory   Vibration: intact  Proprioception: intact  Monofilament testing: intact    Vascular  Capillary refills: < 3 seconds  The right DP pulse is 2+. The right PT pulse is 2+.     Left Foot/Ankle  Left Foot Inspection  Skin Exam: skin normal and skin intact. No dry skin, no warmth, no erythema, no maceration, normal color (Spider veins noted), no pre-ulcer, no ulcer and no callus.     Toe Exam: ROM and strength within normal limits and left toe deformity. No swelling, no tenderness and no erythema.     Sensory   Vibration: intact  Proprioception: intact  Monofilament testing: diminished    Vascular  Capillary refills: < 3 seconds  The left DP pulse is 2+. The left PT pulse is 2+.     Assign Risk Category  No deformity present  Loss of protective sensation  No weak pulses  Risk: 1

## 2025-06-05 NOTE — PATIENT INSTRUCTIONS
Medicare Preventive Visit Patient Instructions  Thank you for completing your Welcome to Medicare Visit or Medicare Annual Wellness Visit today. Your next wellness visit will be due in one year (6/6/2026).  The screening/preventive services that you may require over the next 5-10 years are detailed below. Some tests may not apply to you based off risk factors and/or age. Screening tests ordered at today's visit but not completed yet may show as past due. Also, please note that scanned in results may not display below.  Preventive Screenings:  Service Recommendations Previous Testing/Comments   Colorectal Cancer Screening  * Colonoscopy    * Fecal Occult Blood Test (FOBT)/Fecal Immunochemical Test (FIT)  * Fecal DNA/Cologuard Test  * Flexible Sigmoidoscopy Age: 45-75 years old   Colonoscopy: every 10 years (may be performed more frequently if at higher risk)  OR  FOBT/FIT: every 1 year  OR  Cologuard: every 3 years  OR  Sigmoidoscopy: every 5 years  Screening may be recommended earlier than age 45 if at higher risk for colorectal cancer. Also, an individualized decision between you and your healthcare provider will decide whether screening between the ages of 76-85 would be appropriate. Colonoscopy: 09/11/2017  FOBT/FIT: Not on file  Cologuard: Not on file  Sigmoidoscopy: Not on file    Screening Current     Breast Cancer Screening Age: 40+ years old  Frequency: every 1-2 years  Not required if history of left and right mastectomy Mammogram: 04/15/2025    Screening Current   Cervical Cancer Screening Between the ages of 21-29, pap smear recommended once every 3 years.   Between the ages of 30-65, can perform pap smear with HPV co-testing every 5 years.   Recommendations may differ for women with a history of total hysterectomy, cervical cancer, or abnormal pap smears in past. Pap Smear: Not on file    Screening Not Indicated   Hepatitis C Screening Once for adults born between 1945 and 1965  More frequently in  patients at high risk for Hepatitis C Hep C Antibody: Not on file        Diabetes Screening 1-2 times per year if you're at risk for diabetes or have pre-diabetes Fasting glucose: 101 mg/dL (5/12/2025)  A1C: 5.1 % (12/10/2024)  Screening Not Indicated  History Diabetes   Cholesterol Screening Once every 5 years if you don't have a lipid disorder. May order more often based on risk factors. Lipid panel: 04/11/2023    Screening Current     Other Preventive Screenings Covered by Medicare:  Abdominal Aortic Aneurysm (AAA) Screening: covered once if your at risk. You're considered to be at risk if you have a family history of AAA.  Lung Cancer Screening: covers low dose CT scan once per year if you meet all of the following conditions: (1) Age 55-77; (2) No signs or symptoms of lung cancer; (3) Current smoker or have quit smoking within the last 15 years; (4) You have a tobacco smoking history of at least 20 pack years (packs per day multiplied by number of years you smoked); (5) You get a written order from a healthcare provider.  Glaucoma Screening: covered annually if you're considered high risk: (1) You have diabetes OR (2) Family history of glaucoma OR (3)  aged 50 and older OR (4)  American aged 65 and older  Osteoporosis Screening: covered every 2 years if you meet one of the following conditions: (1) You're estrogen deficient and at risk for osteoporosis based off medical history and other findings; (2) Have a vertebral abnormality; (3) On glucocorticoid therapy for more than 3 months; (4) Have primary hyperparathyroidism; (5) On osteoporosis medications and need to assess response to drug therapy.   Last bone density test (DXA Scan): 06/06/2024.  HIV Screening: covered annually if you're between the age of 15-65. Also covered annually if you are younger than 15 and older than 65 with risk factors for HIV infection. For pregnant patients, it is covered up to 3 times per  pregnancy.    Immunizations:  Immunization Recommendations   Influenza Vaccine Annual influenza vaccination during flu season is recommended for all persons aged >= 6 months who do not have contraindications   Pneumococcal Vaccine   * Pneumococcal conjugate vaccine = PCV13 (Prevnar 13), PCV15 (Vaxneuvance), PCV20 (Prevnar 20)  * Pneumococcal polysaccharide vaccine = PPSV23 (Pneumovax) Adults 19-65 yo with certain risk factors or if 65+ yo  If never received any pneumonia vaccine: recommend Prevnar 20 (PCV20)  Give PCV20 if previously received 1 dose of PCV13 or PPSV23   Hepatitis B Vaccine 3 dose series if at intermediate or high risk (ex: diabetes, end stage renal disease, liver disease)   Respiratory syncytial virus (RSV) Vaccine - COVERED BY MEDICARE PART D  * RSVPreF3 (Arexvy) CDC recommends that adults 60 years of age and older may receive a single dose of RSV vaccine using shared clinical decision-making (SCDM)   Tetanus (Td) Vaccine - COST NOT COVERED BY MEDICARE PART B Following completion of primary series, a booster dose should be given every 10 years to maintain immunity against tetanus. Td may also be given as tetanus wound prophylaxis.   Tdap Vaccine - COST NOT COVERED BY MEDICARE PART B Recommended at least once for all adults. For pregnant patients, recommended with each pregnancy.   Shingles Vaccine (Shingrix) - COST NOT COVERED BY MEDICARE PART B  2 shot series recommended in those 19 years and older who have or will have weakened immune systems or those 50 years and older     Health Maintenance Due:      Topic Date Due   • Hepatitis C Screening  Never done   • HIV Screening  Never done   • Breast Cancer Screening: Mammogram  04/15/2027   • Colorectal Cancer Screening  09/11/2027     Immunizations Due:      Topic Date Due   • COVID-19 Vaccine (5 - 2024-25 season) 09/01/2024     Advance Directives   What are advance directives?  Advance directives are legal documents that state your wishes and plans  for medical care. These plans are made ahead of time in case you lose your ability to make decisions for yourself. Advance directives can apply to any medical decision, such as the treatments you want, and if you want to donate organs.   What are the types of advance directives?  There are many types of advance directives, and each state has rules about how to use them. You may choose a combination of any of the following:  Living will:  This is a written record of the treatment you want. You can also choose which treatments you do not want, which to limit, and which to stop at a certain time. This includes surgery, medicine, IV fluid, and tube feedings.   Durable power of  for healthcare (DPAHC):  This is a written record that states who you want to make healthcare choices for you when you are unable to make them for yourself. This person, called a proxy, is usually a family member or a friend. You may choose more than 1 proxy.  Do not resuscitate (DNR) order:  A DNR order is used in case your heart stops beating or you stop breathing. It is a request not to have certain forms of treatment, such as CPR. A DNR order may be included in other types of advance directives.  Medical directive:  This covers the care that you want if you are in a coma, near death, or unable to make decisions for yourself. You can list the treatments you want for each condition. Treatment may include pain medicine, surgery, blood transfusions, dialysis, IV or tube feedings, and a ventilator (breathing machine).  Values history:  This document has questions about your views, beliefs, and how you feel and think about life. This information can help others choose the care that you would choose.  Why are advance directives important?  An advance directive helps you control your care. Although spoken wishes may be used, it is better to have your wishes written down. Spoken wishes can be misunderstood, or not followed. Treatments may be  given even if you do not want them. An advance directive may make it easier for your family to make difficult choices about your care.   Urinary Incontinence   Urinary incontinence (UI)  is when you lose control of your bladder. UI develops because your bladder cannot store or empty urine properly. The 3 most common types of UI are stress incontinence, urge incontinence, or both.  Medicines:   May be given to help strengthen your bladder control. Report any side effects of medication to your healthcare provider.  Do pelvic muscle exercises often:  Your pelvic muscles help you stop urinating. Squeeze these muscles tight for 5 seconds, then relax for 5 seconds. Gradually work up to squeezing for 10 seconds. Do 3 sets of 15 repetitions a day, or as directed. This will help strengthen your pelvic muscles and improve bladder control.  Train your bladder:  Go to the bathroom at set times, such as every 2 hours, even if you do not feel the urge to go. You can also try to hold your urine when you feel the urge to go. For example, hold your urine for 5 minutes when you feel the urge to go. As that becomes easier, hold your urine for 10 minutes.   Self-care:   Keep a UI record.  Write down how often you leak urine and how much you leak. Make a note of what you were doing when you leaked urine.  Drink liquids as directed. You may need to limit the amount of liquid you drink to help control your urine leakage. Do not drink any liquid right before you go to bed. Limit or do not have drinks that contain caffeine or alcohol.   Prevent constipation.  Eat a variety of high-fiber foods. Good examples are high-fiber cereals, beans, vegetables, and whole-grain breads. Walking is the best way to trigger your intestines to have a bowel movement.  Exercise regularly and maintain a healthy weight.  Weight loss and exercise will decrease pressure on your bladder and help you control your leakage.   Use a catheter as directed  to help empty  your bladder. A catheter is a tiny, plastic tube that is put into your bladder to drain your urine.   Go to behavior therapy as directed.  Behavior therapy may be used to help you learn to control your urge to urinate.    Weight Management   Why it is important to manage your weight:  Being overweight increases your risk of health conditions such as heart disease, high blood pressure, type 2 diabetes, and certain types of cancer. It can also increase your risk for osteoarthritis, sleep apnea, and other respiratory problems. Aim for a slow, steady weight loss. Even a small amount of weight loss can lower your risk of health problems.  How to lose weight safely:  A safe and healthy way to lose weight is to eat fewer calories and get regular exercise. You can lose up about 1 pound a week by decreasing the number of calories you eat by 500 calories each day.   Healthy meal plan for weight management:  A healthy meal plan includes a variety of foods, contains fewer calories, and helps you stay healthy. A healthy meal plan includes the following:  Eat whole-grain foods more often.  A healthy meal plan should contain fiber. Fiber is the part of grains, fruits, and vegetables that is not broken down by your body. Whole-grain foods are healthy and provide extra fiber in your diet. Some examples of whole-grain foods are whole-wheat breads and pastas, oatmeal, brown rice, and bulgur.  Eat a variety of vegetables every day.  Include dark, leafy greens such as spinach, kale, praveen greens, and mustard greens. Eat yellow and orange vegetables such as carrots, sweet potatoes, and winter squash.   Eat a variety of fruits every day.  Choose fresh or canned fruit (canned in its own juice or light syrup) instead of juice. Fruit juice has very little or no fiber.  Eat low-fat dairy foods.  Drink fat-free (skim) milk or 1% milk. Eat fat-free yogurt and low-fat cottage cheese. Try low-fat cheeses such as mozzarella and other reduced-fat  cheeses.  Choose meat and other protein foods that are low in fat.  Choose beans or other legumes such as split peas or lentils. Choose fish, skinless poultry (chicken or turkey), or lean cuts of red meat (beef or pork). Before you cook meat or poultry, cut off any visible fat.   Use less fat and oil.  Try baking foods instead of frying them. Add less fat, such as margarine, sour cream, regular salad dressing and mayonnaise to foods. Eat fewer high-fat foods. Some examples of high-fat foods include french fries, doughnuts, ice cream, and cakes.  Eat fewer sweets.  Limit foods and drinks that are high in sugar. This includes candy, cookies, regular soda, and sweetened drinks.  Exercise:  Exercise at least 30 minutes per day on most days of the week. Some examples of exercise include walking, biking, dancing, and swimming. You can also fit in more physical activity by taking the stairs instead of the elevator or parking farther away from stores. Ask your healthcare provider about the best exercise plan for you.    © Copyright [employer] 2018 Information is for End User's use only and may not be sold, redistributed or otherwise used for commercial purposes. All illustrations and images included in CareNotes® are the copyrighted property of [employer] or [employer]

## 2025-06-11 ENCOUNTER — RESULTS FOLLOW-UP (OUTPATIENT)
Dept: FAMILY MEDICINE CLINIC | Facility: CLINIC | Age: 65
End: 2025-06-11

## 2025-06-11 ENCOUNTER — HOSPITAL ENCOUNTER (OUTPATIENT)
Dept: ULTRASOUND IMAGING | Facility: HOSPITAL | Age: 65
Discharge: HOME | End: 2025-06-11
Attending: NURSE PRACTITIONER
Payer: MEDICARE

## 2025-06-11 DIAGNOSIS — R94.5 NONSPECIFIC ABNORMAL RESULTS OF LIVER FUNCTION STUDY: ICD-10-CM

## 2025-06-11 DIAGNOSIS — K76.0 NAFLD (NONALCOHOLIC FATTY LIVER DISEASE): ICD-10-CM

## 2025-06-11 PROCEDURE — 76700 US EXAM ABDOM COMPLETE: CPT

## (undated) DEVICE — STRL COTTON TIP APPLCTR 6IN PK: Brand: CARDINAL HEALTH

## (undated) DEVICE — FENESTRATED BIPOLAR FORCEPS: Brand: ENDOWRIST

## (undated) DEVICE — LUBRICANT SURGILUBE TUBE 4 OZ  FLIP TOP

## (undated) DEVICE — CANNULA SEAL

## (undated) DEVICE — PACK ROBOTIC PROSTATE PBDS DA VINCI SI/XI

## (undated) DEVICE — FORCEP ELECSURG RADIAL JAW4 2.2 X 240CM  HOT BX

## (undated) DEVICE — 3000CC GUARDIAN II: Brand: GUARDIAN

## (undated) DEVICE — SUT STRATAFIX SPIRAL 2-0 CT-1 20 CM SXPD1B400

## (undated) DEVICE — SPECIMEN TRAP: Brand: ARGYLE

## (undated) DEVICE — GLOVE SRG BIOGEL 8.5

## (undated) DEVICE — SUT MONOCRYL 4-0 PS-2 27 IN Y426H

## (undated) DEVICE — TIP COVER ACCESSORY

## (undated) DEVICE — PROGRASP FORCEPS: Brand: ENDOWRIST

## (undated) DEVICE — LARGE NEEDLE DRIVER: Brand: ENDOWRIST

## (undated) DEVICE — COLUMN DRAPE

## (undated) DEVICE — ENDOPATH XCEL BLADELESS TROCARS WITH STABILITY SLEEVES: Brand: ENDOPATH XCEL

## (undated) DEVICE — GLOVE INDICATOR PI UNDERGLOVE SZ 7 BLUE

## (undated) DEVICE — DRAPE FLUID WARMER (BIRD BATH)

## (undated) DEVICE — 1820 FOAM BLOCK NEEDLE COUNTER: Brand: DEVON

## (undated) DEVICE — ARM DRAPE

## (undated) DEVICE — LUBRICANT INST ELECTROLUBE ANTISTK WO PAD

## (undated) DEVICE — GLOVE INDICATOR PI UNDERGLOVE SZ 8.5 BLUE

## (undated) DEVICE — NEEDLE 25G X 1 1/2

## (undated) DEVICE — SYRINGE 50ML LL

## (undated) DEVICE — MONOPOLAR CURVED SCISSORS: Brand: ENDOWRIST

## (undated) DEVICE — 2000CC GUARDIAN II: Brand: GUARDIAN

## (undated) DEVICE — UTERINE MANIPULATOR RUMI 6.7 X 8 CM

## (undated) DEVICE — MAYO STAND COVER: Brand: CONVERTORS

## (undated) DEVICE — CLAMP TOWEL TUBING DISPOSABLE

## (undated) DEVICE — CHLORAPREP HI-LITE 26ML ORANGE

## (undated) DEVICE — TUBING SUCTION 5MM X 12 FT

## (undated) DEVICE — TRAY FOLEY 16FR URIMETER SURESTEP

## (undated) DEVICE — ADHESIVE SKN CLSR HISTOACRYL FLEX 0.5ML LF

## (undated) DEVICE — THE LARIAT SNARE IS AN ELECTROSURGICAL DEVICE USED TO ENDOSCOPICALLY GRASP, DISSECT, AND TRANSECT TISSUE DURING GASTROINTESTINAL (GI) ENDOSCOPIC PROCEDURES.  THE SNARE CAN BE USED WITH OR WITHOUT THE USE OF MONOPOLAR DIATHERMIC ENERGY.: Brand: LARIAT

## (undated) DEVICE — OCCLUDER COLPO-PNEUMO

## (undated) DEVICE — CHLORHEXIDINE 4PCT 4 OZ

## (undated) DEVICE — SYRINGE 10ML LL

## (undated) DEVICE — ANTIBACTERIAL VIOLET BRAIDED (POLYGLACTIN 910), SYNTHETIC ABSORBABLE SUTURE: Brand: COATED VICRYL

## (undated) DEVICE — ALL PURPOSE SPONGES,NONWOVEN, 4 PLY: Brand: CURITY